# Patient Record
Sex: MALE | Race: WHITE | Employment: OTHER | URBAN - METROPOLITAN AREA
[De-identification: names, ages, dates, MRNs, and addresses within clinical notes are randomized per-mention and may not be internally consistent; named-entity substitution may affect disease eponyms.]

---

## 2022-08-18 ENCOUNTER — APPOINTMENT (OUTPATIENT)
Dept: GENERAL RADIOLOGY | Age: 67
DRG: 617 | End: 2022-08-18
Payer: MEDICARE

## 2022-08-18 ENCOUNTER — HOSPITAL ENCOUNTER (INPATIENT)
Age: 67
LOS: 6 days | Discharge: HOME | DRG: 617 | End: 2022-08-24
Attending: EMERGENCY MEDICINE | Admitting: INTERNAL MEDICINE
Payer: MEDICARE

## 2022-08-18 ENCOUNTER — APPOINTMENT (OUTPATIENT)
Dept: MRI IMAGING | Age: 67
DRG: 617 | End: 2022-08-18
Payer: MEDICARE

## 2022-08-18 DIAGNOSIS — M86.171 ACUTE OSTEOMYELITIS OF TOE OF RIGHT FOOT (HCC): ICD-10-CM

## 2022-08-18 DIAGNOSIS — L08.9 DIABETIC INFECTION OF RIGHT FOOT (HCC): ICD-10-CM

## 2022-08-18 DIAGNOSIS — E11.628 DIABETIC FOOT INFECTION (HCC): ICD-10-CM

## 2022-08-18 DIAGNOSIS — L08.9 DIABETIC FOOT INFECTION (HCC): ICD-10-CM

## 2022-08-18 DIAGNOSIS — E11.628 DIABETIC INFECTION OF RIGHT FOOT (HCC): ICD-10-CM

## 2022-08-18 DIAGNOSIS — M86.9 OSTEOMYELITIS, UNSPECIFIED SITE, UNSPECIFIED TYPE (HCC): ICD-10-CM

## 2022-08-18 DIAGNOSIS — E11.9 NEW ONSET TYPE 2 DIABETES MELLITUS (HCC): Primary | ICD-10-CM

## 2022-08-18 PROBLEM — R73.9 HYPERGLYCEMIA: Status: ACTIVE | Noted: 2022-08-18

## 2022-08-18 LAB
ABSOLUTE EOS #: <0.03 K/UL (ref 0–0.44)
ABSOLUTE IMMATURE GRANULOCYTE: 0.04 K/UL (ref 0–0.3)
ABSOLUTE LYMPH #: 1.07 K/UL (ref 1.1–3.7)
ABSOLUTE MONO #: 1.32 K/UL (ref 0.1–1.2)
ANION GAP SERPL CALCULATED.3IONS-SCNC: 11 MMOL/L (ref 9–17)
BASOPHILS # BLD: 0 % (ref 0–2)
BASOPHILS ABSOLUTE: 0.03 K/UL (ref 0–0.2)
BUN BLDV-MCNC: 20 MG/DL (ref 8–23)
BUN/CREAT BLD: 23 (ref 9–20)
C-REACTIVE PROTEIN: 186.6 MG/L (ref 0–5)
CALCIUM SERPL-MCNC: 9.4 MG/DL (ref 8.6–10.4)
CHLORIDE BLD-SCNC: 93 MMOL/L (ref 98–107)
CO2: 27 MMOL/L (ref 20–31)
CREAT SERPL-MCNC: 0.88 MG/DL (ref 0.7–1.2)
EOSINOPHILS RELATIVE PERCENT: 0 % (ref 1–4)
ESTIMATED AVERAGE GLUCOSE: 318 MG/DL
GFR AFRICAN AMERICAN: >60 ML/MIN
GFR NON-AFRICAN AMERICAN: >60 ML/MIN
GFR SERPL CREATININE-BSD FRML MDRD: ABNORMAL ML/MIN/{1.73_M2}
GLUCOSE BLD-MCNC: 252 MG/DL (ref 75–110)
GLUCOSE BLD-MCNC: 267 MG/DL (ref 75–110)
GLUCOSE BLD-MCNC: 353 MG/DL (ref 75–110)
GLUCOSE BLD-MCNC: 376 MG/DL (ref 70–99)
HBA1C MFR BLD: 12.7 % (ref 4–6)
HCT VFR BLD CALC: 44.8 % (ref 40.7–50.3)
HEMOGLOBIN: 15 G/DL (ref 13–17)
IMMATURE GRANULOCYTES: 0 %
LYMPHOCYTES # BLD: 9 % (ref 24–43)
MCH RBC QN AUTO: 30.6 PG (ref 25.2–33.5)
MCHC RBC AUTO-ENTMCNC: 33.5 G/DL (ref 28.4–34.8)
MCV RBC AUTO: 91.4 FL (ref 82.6–102.9)
MONOCYTES # BLD: 11 % (ref 3–12)
NRBC AUTOMATED: 0 PER 100 WBC
PDW BLD-RTO: 12 % (ref 11.8–14.4)
PLATELET # BLD: 198 K/UL (ref 138–453)
PMV BLD AUTO: 9.7 FL (ref 8.1–13.5)
POTASSIUM SERPL-SCNC: 4.2 MMOL/L (ref 3.7–5.3)
RBC # BLD: 4.9 M/UL (ref 4.21–5.77)
SEDIMENTATION RATE, ERYTHROCYTE: 81 MM/HR (ref 0–20)
SEG NEUTROPHILS: 80 % (ref 36–65)
SEGMENTED NEUTROPHILS ABSOLUTE COUNT: 9.67 K/UL (ref 1.5–8.1)
SODIUM BLD-SCNC: 131 MMOL/L (ref 135–144)
WBC # BLD: 12.1 K/UL (ref 3.5–11.3)

## 2022-08-18 PROCEDURE — 80048 BASIC METABOLIC PNL TOTAL CA: CPT

## 2022-08-18 PROCEDURE — 6360000002 HC RX W HCPCS: Performed by: NURSE PRACTITIONER

## 2022-08-18 PROCEDURE — 73630 X-RAY EXAM OF FOOT: CPT

## 2022-08-18 PROCEDURE — 96365 THER/PROPH/DIAG IV INF INIT: CPT

## 2022-08-18 PROCEDURE — 99285 EMERGENCY DEPT VISIT HI MDM: CPT

## 2022-08-18 PROCEDURE — 2580000003 HC RX 258: Performed by: NURSE PRACTITIONER

## 2022-08-18 PROCEDURE — 1200000000 HC SEMI PRIVATE

## 2022-08-18 PROCEDURE — 6360000002 HC RX W HCPCS: Performed by: EMERGENCY MEDICINE

## 2022-08-18 PROCEDURE — 85652 RBC SED RATE AUTOMATED: CPT

## 2022-08-18 PROCEDURE — 2580000003 HC RX 258: Performed by: STUDENT IN AN ORGANIZED HEALTH CARE EDUCATION/TRAINING PROGRAM

## 2022-08-18 PROCEDURE — 6370000000 HC RX 637 (ALT 250 FOR IP): Performed by: NURSE PRACTITIONER

## 2022-08-18 PROCEDURE — 83036 HEMOGLOBIN GLYCOSYLATED A1C: CPT

## 2022-08-18 PROCEDURE — 0HBMXZZ EXCISION OF RIGHT FOOT SKIN, EXTERNAL APPROACH: ICD-10-PCS | Performed by: PODIATRIST

## 2022-08-18 PROCEDURE — 6360000004 HC RX CONTRAST MEDICATION: Performed by: STUDENT IN AN ORGANIZED HEALTH CARE EDUCATION/TRAINING PROGRAM

## 2022-08-18 PROCEDURE — 2580000003 HC RX 258: Performed by: EMERGENCY MEDICINE

## 2022-08-18 PROCEDURE — 87040 BLOOD CULTURE FOR BACTERIA: CPT

## 2022-08-18 PROCEDURE — 99223 1ST HOSP IP/OBS HIGH 75: CPT | Performed by: NURSE PRACTITIONER

## 2022-08-18 PROCEDURE — 82947 ASSAY GLUCOSE BLOOD QUANT: CPT

## 2022-08-18 PROCEDURE — A9579 GAD-BASE MR CONTRAST NOS,1ML: HCPCS | Performed by: STUDENT IN AN ORGANIZED HEALTH CARE EDUCATION/TRAINING PROGRAM

## 2022-08-18 PROCEDURE — 85025 COMPLETE CBC W/AUTO DIFF WBC: CPT

## 2022-08-18 PROCEDURE — 73720 MRI LWR EXTREMITY W/O&W/DYE: CPT

## 2022-08-18 PROCEDURE — 86140 C-REACTIVE PROTEIN: CPT

## 2022-08-18 RX ORDER — SODIUM CHLORIDE 0.9 % (FLUSH) 0.9 %
10 SYRINGE (ML) INJECTION ONCE
Status: COMPLETED | OUTPATIENT
Start: 2022-08-18 | End: 2022-08-18

## 2022-08-18 RX ORDER — POTASSIUM CHLORIDE 20 MEQ/1
40 TABLET, EXTENDED RELEASE ORAL PRN
Status: DISCONTINUED | OUTPATIENT
Start: 2022-08-18 | End: 2022-08-24 | Stop reason: HOSPADM

## 2022-08-18 RX ORDER — ACETAMINOPHEN 650 MG/1
650 SUPPOSITORY RECTAL EVERY 6 HOURS PRN
Status: DISCONTINUED | OUTPATIENT
Start: 2022-08-18 | End: 2022-08-24 | Stop reason: HOSPADM

## 2022-08-18 RX ORDER — MAGNESIUM SULFATE 1 G/100ML
1000 INJECTION INTRAVENOUS PRN
Status: DISCONTINUED | OUTPATIENT
Start: 2022-08-18 | End: 2022-08-24 | Stop reason: HOSPADM

## 2022-08-18 RX ORDER — ONDANSETRON 4 MG/1
4 TABLET, ORALLY DISINTEGRATING ORAL EVERY 8 HOURS PRN
Status: DISCONTINUED | OUTPATIENT
Start: 2022-08-18 | End: 2022-08-24 | Stop reason: HOSPADM

## 2022-08-18 RX ORDER — INSULIN LISPRO 100 [IU]/ML
0-4 INJECTION, SOLUTION INTRAVENOUS; SUBCUTANEOUS NIGHTLY
Status: DISCONTINUED | OUTPATIENT
Start: 2022-08-18 | End: 2022-08-22

## 2022-08-18 RX ORDER — HYDROCODONE BITARTRATE AND ACETAMINOPHEN 5; 325 MG/1; MG/1
1 TABLET ORAL EVERY 4 HOURS PRN
Status: DISCONTINUED | OUTPATIENT
Start: 2022-08-18 | End: 2022-08-24 | Stop reason: HOSPADM

## 2022-08-18 RX ORDER — POTASSIUM CHLORIDE 7.45 MG/ML
10 INJECTION INTRAVENOUS PRN
Status: DISCONTINUED | OUTPATIENT
Start: 2022-08-18 | End: 2022-08-24 | Stop reason: HOSPADM

## 2022-08-18 RX ORDER — ENOXAPARIN SODIUM 100 MG/ML
40 INJECTION SUBCUTANEOUS DAILY
Status: DISCONTINUED | OUTPATIENT
Start: 2022-08-18 | End: 2022-08-24

## 2022-08-18 RX ORDER — POLYETHYLENE GLYCOL 3350 17 G/17G
17 POWDER, FOR SOLUTION ORAL DAILY PRN
Status: DISCONTINUED | OUTPATIENT
Start: 2022-08-18 | End: 2022-08-24 | Stop reason: HOSPADM

## 2022-08-18 RX ORDER — SODIUM CHLORIDE 0.9 % (FLUSH) 0.9 %
5-40 SYRINGE (ML) INJECTION EVERY 12 HOURS SCHEDULED
Status: DISCONTINUED | OUTPATIENT
Start: 2022-08-18 | End: 2022-08-24 | Stop reason: HOSPADM

## 2022-08-18 RX ORDER — INSULIN LISPRO 100 [IU]/ML
0-4 INJECTION, SOLUTION INTRAVENOUS; SUBCUTANEOUS
Status: DISCONTINUED | OUTPATIENT
Start: 2022-08-18 | End: 2022-08-22

## 2022-08-18 RX ORDER — SODIUM CHLORIDE 9 MG/ML
INJECTION, SOLUTION INTRAVENOUS CONTINUOUS
Status: DISCONTINUED | OUTPATIENT
Start: 2022-08-18 | End: 2022-08-20

## 2022-08-18 RX ORDER — ONDANSETRON 2 MG/ML
4 INJECTION INTRAMUSCULAR; INTRAVENOUS EVERY 6 HOURS PRN
Status: DISCONTINUED | OUTPATIENT
Start: 2022-08-18 | End: 2022-08-24 | Stop reason: HOSPADM

## 2022-08-18 RX ORDER — DEXTROSE MONOHYDRATE 100 MG/ML
INJECTION, SOLUTION INTRAVENOUS CONTINUOUS PRN
Status: DISCONTINUED | OUTPATIENT
Start: 2022-08-18 | End: 2022-08-24 | Stop reason: HOSPADM

## 2022-08-18 RX ORDER — HYDROCODONE BITARTRATE AND ACETAMINOPHEN 5; 325 MG/1; MG/1
2 TABLET ORAL EVERY 4 HOURS PRN
Status: DISCONTINUED | OUTPATIENT
Start: 2022-08-18 | End: 2022-08-24 | Stop reason: HOSPADM

## 2022-08-18 RX ORDER — SODIUM CHLORIDE 0.9 % (FLUSH) 0.9 %
10 SYRINGE (ML) INJECTION PRN
Status: DISCONTINUED | OUTPATIENT
Start: 2022-08-18 | End: 2022-08-24 | Stop reason: HOSPADM

## 2022-08-18 RX ORDER — ACETAMINOPHEN 325 MG/1
650 TABLET ORAL EVERY 6 HOURS PRN
Status: DISCONTINUED | OUTPATIENT
Start: 2022-08-18 | End: 2022-08-24 | Stop reason: HOSPADM

## 2022-08-18 RX ORDER — SODIUM CHLORIDE 9 MG/ML
INJECTION, SOLUTION INTRAVENOUS PRN
Status: DISCONTINUED | OUTPATIENT
Start: 2022-08-18 | End: 2022-08-24 | Stop reason: HOSPADM

## 2022-08-18 RX ADMIN — INSULIN LISPRO 4 UNITS: 100 INJECTION, SOLUTION INTRAVENOUS; SUBCUTANEOUS at 13:58

## 2022-08-18 RX ADMIN — SODIUM CHLORIDE: 9 INJECTION, SOLUTION INTRAVENOUS at 18:58

## 2022-08-18 RX ADMIN — INSULIN LISPRO 2 UNITS: 100 INJECTION, SOLUTION INTRAVENOUS; SUBCUTANEOUS at 17:14

## 2022-08-18 RX ADMIN — GADOTERIDOL 19 ML: 279.3 INJECTION, SOLUTION INTRAVENOUS at 15:47

## 2022-08-18 RX ADMIN — PIPERACILLIN AND TAZOBACTAM 3375 MG: 3; .375 INJECTION, POWDER, FOR SOLUTION INTRAVENOUS at 18:58

## 2022-08-18 RX ADMIN — PIPERACILLIN AND TAZOBACTAM 4500 MG: 4; .5 INJECTION, POWDER, FOR SOLUTION INTRAVENOUS at 12:22

## 2022-08-18 RX ADMIN — ACETAMINOPHEN 650 MG: 325 TABLET, FILM COATED ORAL at 22:39

## 2022-08-18 RX ADMIN — VANCOMYCIN HYDROCHLORIDE 2000 MG: 5 INJECTION, POWDER, LYOPHILIZED, FOR SOLUTION INTRAVENOUS at 13:40

## 2022-08-18 RX ADMIN — ENOXAPARIN SODIUM 40 MG: 100 INJECTION SUBCUTANEOUS at 18:59

## 2022-08-18 RX ADMIN — SODIUM CHLORIDE, PRESERVATIVE FREE 10 ML: 5 INJECTION INTRAVENOUS at 15:46

## 2022-08-18 ASSESSMENT — PAIN DESCRIPTION - FREQUENCY: FREQUENCY: CONTINUOUS

## 2022-08-18 ASSESSMENT — PAIN DESCRIPTION - ORIENTATION: ORIENTATION: MID

## 2022-08-18 ASSESSMENT — PAIN - FUNCTIONAL ASSESSMENT
PAIN_FUNCTIONAL_ASSESSMENT: 0-10
PAIN_FUNCTIONAL_ASSESSMENT: ACTIVITIES ARE NOT PREVENTED

## 2022-08-18 ASSESSMENT — ENCOUNTER SYMPTOMS
CONSTIPATION: 0
SORE THROAT: 0
SHORTNESS OF BREATH: 0
COLOR CHANGE: 0
NAUSEA: 0
CHEST TIGHTNESS: 0
EYES NEGATIVE: 1
VOMITING: 0
SINUS PAIN: 0
COUGH: 0
DIARRHEA: 0
COLOR CHANGE: 1
APNEA: 0
ABDOMINAL DISTENTION: 0

## 2022-08-18 ASSESSMENT — PAIN DESCRIPTION - LOCATION: LOCATION: COCCYX;HEAD

## 2022-08-18 ASSESSMENT — PAIN SCALES - GENERAL
PAINLEVEL_OUTOF10: 0
PAINLEVEL_OUTOF10: 2
PAINLEVEL_OUTOF10: 0

## 2022-08-18 ASSESSMENT — PAIN DESCRIPTION - DESCRIPTORS: DESCRIPTORS: ACHING

## 2022-08-18 ASSESSMENT — PAIN DESCRIPTION - ONSET: ONSET: ON-GOING

## 2022-08-18 ASSESSMENT — PAIN DESCRIPTION - PAIN TYPE: TYPE: ACUTE PAIN

## 2022-08-18 NOTE — CONSULTS
[facility] Pharmacokinetic Monitoring Service - Vancomycin     Kvng Robles is a 78 y.o. male starting on vancomycin therapy for SSTI. Pharmacy consulted by ORACIO Malhotra for monitoring and adjustment. Target Concentration: Goal trough of 10-15 mg/L and AUC/DANIELLE <500 mg*hr/L    Additional Antimicrobials: Zosyn    Pertinent Laboratory Values: Wt Readings from Last 1 Encounters:   08/18/22 200 lb (90.7 kg)     Temp Readings from Last 1 Encounters:   08/18/22 98.1 °F (36.7 °C) (Oral)     Estimated Creatinine Clearance: 96 mL/min (based on SCr of 0.88 mg/dL). Recent Labs     08/18/22  1135   CREATININE 0.88   WBC 12.1*     MRSA Nasal Swab: N/A. Non-respiratory infection.     Plan:  Dosing recommendations based on Bayesian software  Start vancomycin 2000mg once followed by 1000mg q12h  Anticipated AUC of 485 and trough concentration of 15.5 at steady state  Renal labs as indicated   Vancomycin concentration ordered for 8/19 @ 0600   Pharmacy will continue to monitor patient and adjust therapy as indicated    Thank you for the consult,  Juice Cox, [facility]  8/18/2022 6:00 PM

## 2022-08-18 NOTE — ED PROVIDER NOTES
EMERGENCY DEPARTMENT ENCOUNTER    Pt Name: Margo Morgan  MRN: [id number]  [place] 1955  Date of evaluation: 8/18/22  CHIEF COMPLAINT       Chief Complaint   Patient presents with    Wound Infection     Right foot infection     HISTORY OF PRESENT ILLNESS   78-year-old male presents emergency room for swelling redness and lesion to the bottom of the great right toe. Patient does not have any known health problems although has not seen a doctor in many years. He does not take any medications. He noticed the swelling and the lesion reportedly about a week ago. He reports it has been sore to walk on. Wife reports that she had seen the toe today and had concerns about how severe it looked. REVIEW OF SYSTEMS     Review of Systems   Constitutional:  Negative for activity change, chills and diaphoresis. HENT:  Negative for congestion, sinus pain and tinnitus. Eyes: Negative. Respiratory:  Negative for apnea, chest tightness and shortness of breath. Gastrointestinal:  Negative for abdominal distention, constipation, diarrhea and vomiting. Genitourinary:  Negative for difficulty urinating and frequency. Musculoskeletal:  Negative for arthralgias and myalgias. Skin:  Negative for color change and rash. Neurological:  Negative for dizziness. Hematological: Negative. Psychiatric/Behavioral: Negative. PASTMEDICAL HISTORY   No past medical history on file. Past Problem List  Patient Active Problem List   Diagnosis Code    [facility] M86.9     SURGICAL HISTORY     No past surgical history on file. CURRENT MEDICATIONS       Previous Medications    No medications on file     ALLERGIES     has No Known Allergies. FAMILY HISTORY     has no family status information on file.       SOCIAL HISTORY        PHYSICAL EXAM     INITIAL VITALS: /87   Pulse (!) 112   Temp 98.2 °F (36.8 °C) (Oral)   Resp 17   Ht 6' 2\" (1.88 m)   Wt 200 lb (90.7 kg)   SpO2 98%   BMI 25.68 kg/m² Physical Exam  Constitutional:       General: He is not in acute distress. Appearance: He is well-developed. HENT:      Head: Normocephalic. Eyes:      Pupils: Pupils are equal, round, and reactive to light. Cardiovascular:      Rate and Rhythm: Normal rate and regular rhythm. Heart sounds: Normal heart sounds. Pulmonary:      Effort: Pulmonary effort is normal. No respiratory distress. Breath sounds: Normal breath sounds. Abdominal:      General: Bowel sounds are normal.      Palpations: Abdomen is soft. Tenderness: There is no abdominal tenderness. Musculoskeletal:         General: Normal range of motion. Feet:      Right foot:      Skin integrity: Ulcer, skin breakdown and erythema present. Comments: Patient has ulcer to the bottom of the great toe with a significant amount of swelling to the toe with extending cellulitis into the foot. The toe is discolored and concerning for developing ischemic disease. Skin:     General: Skin is warm and dry. Neurological:      Mental Status: He is alert and oriented to person, place, and time. MEDICAL DECISION MAKIN-year-old male presents emergency room for swelling and ulcer to the great right toe. On exam patient has marked swelling with ulceration. Glucose is 316. This is a diabetic foot wound and does appear infected. I have high suspicion for osteomyelitis given his exam and the x-ray. Case discussed with podiatry resident. Plan is for admission and IV antibiotics. Case discussed with [facility] who will accept.        CRITICAL CARE:       PROCEDURES:    Procedures    DIAGNOSTIC RESULTS   EKG:All EKG's are interpreted by the Emergency Department Physician who either signs or Co-signs this chart in the absence of a cardiologist.        RADIOLOGY:All plain film, CT, MRI, and formal ultrasound images (except ED bedside ultrasound) are read by the radiologist, see reports below, unless otherwisenoted in MDM or here.  XR FOOT RIGHT (MIN 3 VIEWS)   Final Result   Mildly heterogeneous attenuation of the 1st distal phalanx and osteomyelitis   is a consideration. Diffuse soft tissue swelling with soft tissue laceration or defect of the 1st   digit. LABS: All lab results were reviewed by myself, and all abnormals are listed below.   Labs Reviewed   CBC WITH AUTO DIFFERENTIAL - Abnormal; Notable for the following components:       Result Value    WBC 12.1 (*)     Seg Neutrophils 80 (*)     Lymphocytes 9 (*)     Eosinophils % 0 (*)     Segs Absolute 9.67 (*)     Absolute Lymph # 1.07 (*)     Absolute Mono # 1.32 (*)     All other components within normal limits   BASIC METABOLIC PANEL - Abnormal; Notable for the following components:    Glucose 376 (*)     Bun/Cre Ratio 23 (*)     Sodium 131 (*)     Chloride 93 (*)     All other components within normal limits   C-REACTIVE PROTEIN - Abnormal; Notable for the following components:    .6 (*)     All other components within normal limits   CULTURE, BLOOD 1   CULTURE, BLOOD 1   SEDIMENTATION RATE       EMERGENCY DEPARTMENTCOURSE:         Vitals:    Vitals:    08/18/22 1040   BP: 115/87   Pulse: (!) 112   Resp: 17   Temp: 98.2 °F (36.8 °C)   TempSrc: Oral   SpO2: 98%   Weight: 200 lb (90.7 kg)   Height: 6' 2\" (1.88 m)       The patient was given the following medications while in the emergency department:  Orders Placed This Encounter   Medications    piperacillin-tazobactam (ZOSYN) 4,500 mg in sodium chloride 0.9 % 100 mL IVPB (mini-bag)     Order Specific Question:   Antimicrobial Indications     Answer:   Skin and Soft Tissue Infection    vancomycin (VANCOCIN) 2,000 mg in dextrose 5 % 500 mL IVPB     Order Specific Question:   Antimicrobial Indications     Answer:   Skin and Soft Tissue Infection    insulin lispro (HUMALOG) injection vial 0-4 Units    insulin lispro (HUMALOG) injection vial 0-4 Units    glucose chewable tablet 16 g    OR Linked Order Group dextrose bolus 10% 125 mL     dextrose bolus 10% 250 mL    glucagon (rDNA) injection 1 mg    dextrose 10 % infusion     CONSULTS:  IP CONSULT TO INTERNAL MEDICINE  PHARMACY TO DOSE VANCOMYCIN  PHARMACY TO DOSE VANCOMYCIN    FINAL IMPRESSION      1. Diabetic foot infection ([facility]          DISPOSITION/PLAN   DISPOSITION Admitted 08/18/2022 01:07:21 PM      PATIENT REFERRED TO:  No follow-up provider specified. DISCHARGE MEDICATIONS:  New Prescriptions    No medications on file     Julio Cesar Olivier MD  Attending Emergency Physician      Care during this encounter was due to an unprecedented national emergency due to COVID-19.              Jose Montemayor MD  08/18/22 [id number]

## 2022-08-18 NOTE — ACP (ADVANCE CARE PLANNING)
Advance Care Planning     Advance Care Planning Activator (Inpatient)  Conversation Note      Date of ACP Conversation: 8/18/2022     Conversation Conducted with: Patient with Decision Making Capacity    ACP Activator: Jaleesa Mccormick, [address] [place]:     Current Designated Health Care Decision Maker:     Click here to complete [address] including section of the Healthcare Decision Maker Relationship (ie \"Primary\")  Today we documented Decision Maker(s) consistent with Legal Next of Kin hierarchy. Care Preferences    Ventilation: \"If you were in your present state of health and suddenly became very ill and were unable to breathe on your own, what would your preference be about the use of a ventilator (breathing machine) if it were available to you? \"      Would the patient desire the use of ventilator (breathing machine)?: no    \"If your health worsens and it becomes clear that your chance of recovery is unlikely, what would your preference be about the use of a ventilator (breathing machine) if it were available to you? \"     Would the patient desire the use of ventilator (breathing machine)?: No      Resuscitation  \"CPR works best to restart the heart when there is a sudden event, like a heart attack, in someone who is otherwise healthy. Unfortunately, CPR does not typically restart the heart for people who have serious health conditions or who are very sick. \"    \"In the event your heart stopped as a result of an underlying serious health condition, would you want attempts to be made to restart your heart (answer \"yes\" for attempt to resuscitate) or would you prefer a natural death (answer \"no\" for do not attempt to resuscitate)? \" yes       [] Yes   [x] No   Educated Patient / Adonis Sauer regarding differences between Advance Directives and portable DNR orders.     Length of ACP Conversation in minutes:  3    Conversation Outcomes:  [x] ACP discussion completed  [] Existing advance directive reviewed with patient; no changes to patient's previously recorded wishes  [] New Advance Directive completed  [] Portable Do Not Rescitate prepared for Provider review and signature  [] POLST/POST/MOLST/MOST prepared for Provider review and signature      Follow-up plan:    [] Schedule follow-up conversation to continue planning  [] Referred individual to Provider for additional questions/concerns   [] Advised patient/agent/surrogate to review completed ACP document and update if needed with changes in condition, patient preferences or care setting    [] This note routed to one or more involved healthcare providers

## 2022-08-18 NOTE — ED NOTES
ED to inpatient nurses report     Chief Complaint   Patient presents with    Wound Infection     Right foot infection      Present to ED from home for swelling redness and lesion to the bottom of the great right toe. Patient does not have any known health problems although has not seen a doctor in many years. He does not take any medications. He noticed the swelling and the lesion reportedly about a week ago. He reports it has been sore to walk on. Wife reports that she had seen the toe today and had concerns about how severe it looked. LOC: alert and orientated to name, place, date  Vital signs   Vitals:    08/18/22 1040   BP: 115/87   Pulse: (!) 112   Resp: 17   Temp: 98.2 °F (36.8 °C)   TempSrc: Oral   SpO2: 98%   Weight: 200 lb (90.7 kg)   Height: 6' 2\" (1.88 m)      Oxygen Baseline RA    Current needs required RA  LDAs:   Peripheral IV 08/18/22 Right Antecubital (Active)     Mobility: Fully dependent  Fall Risk:    Pending ED orders: None  Present condition: Stable  Code Status: Full  Consults: IP CONSULT TO INTERNAL MEDICINE  PHARMACY TO DOSE VANCOMYCIN  PHARMACY TO DOSE VANCOMYCIN  [x]  Hospitalist  Completed  [x] yes [] no Who:   []  Medicine  Completed  [] yes [] No Who:   []  Cardiology  Completed  [] yes [] No Who:   []  GI   Completed  [] yes [] No Who:   []  Neurology  Completed  [] yes [] No Who:   []  Nephrology Completed  [] yes [] No Who:    []  Vascular  Completed  [] yes [] No Who:   []  Ortho  Completed  [] yes [] No Who:     []  Surgery  Completed  [] yes [] No Who:    []  Urology  Completed  [] yes [] No Who:    []  CT Surgery Completed  [] yes [] No Who:   []  Podiatry  Completed  [] yes [] No Who:    []  Other    Completed  [] yes [] No Who:  Interventions: IV, Labs, antibiotics, Insulin. Important Events: Pt has a significant infection to his Rt big toe.  Pt states he is not diabetic, Last BS was 353       Electronically signed by Henrietta Lucas RN on 8/18/2022 at 2:20 PM Henrietta Lucas RN  08/18/22 [id number]

## 2022-08-18 NOTE — CONSULTS
Consultation Note  Podiatric Medicine and Surgery     Subjective     Chief Complaint: Right foot infection    HPI:  Charles Larry is a 78 y.o. male seen at [phone] for a wound on the right foot. The patient reports that he does not follow with any doctors, does not take any medications other than vitamins, and does not have any significant past medical history, including diabetes. His wife is present at bedside and they report the wound started approximately 5 weeks ago, unknown cause, and has since become progressively worse. They have been soaking the foot in apple cider vinegar but not applying anything else to the wound. Patient states he doesn't smoke or drink alcohol. He has no complaints regarding the left foot. PCP is No primary care provider on file. ROS:   Review of Systems   Constitutional:  Negative for chills and fever. HENT:  Negative for congestion and sore throat. Respiratory:  Negative for cough and shortness of breath. Cardiovascular:  Positive for leg swelling (right). Negative for chest pain. Gastrointestinal:  Negative for diarrhea, nausea and vomiting. Musculoskeletal:  Negative for arthralgias, gait problem, joint swelling and myalgias. Skin:  Positive for color change and wound. Neurological:  Negative for syncope and numbness. Psychiatric/Behavioral:  Negative for behavioral problems and confusion. Past Medical History   has no past medical history on file. Past Surgical History   has no past surgical history on file. Medications  Prior to Admission medications    Not on File    Scheduled Meds:   vancomycin  2,000 mg IntraVENous Once    insulin lispro  0-4 Units SubCUTAneous TID WC    insulin lispro  0-4 Units SubCUTAneous Nightly     Continuous Infusions:   dextrose       PRN Meds:.glucose, dextrose bolus **OR** dextrose bolus, glucagon (rDNA), dextrose    Allergies  has No Known Allergies.     Family History  family history is not on file.    Social History   has no history on file for tobacco use.   has no history on file for alcohol use.   has no history on file for drug use. Objective     Vitals:  Patient Vitals for the past 8 hrs:   BP Temp Temp src Pulse Resp SpO2 Height Weight   22 1040 115/87 98.2 °F (36.8 °C) Oral (!) 112 17 98 % 6' 2\" (1.88 m) 200 lb (90.7 kg)     Average, Min, and Max for last 24 hours Vitals:  TEMPERATURE:  Temp  Av.2 °F (36.8 °C)  Min: 98.2 °F (36.8 °C)  Max: 98.2 °F (36.8 °C)    RESPIRATIONS RANGE: Resp  Av  Min: 17  Max: 17    PULSE RANGE: Pulse  Av  Min: 112  Max: 112    BLOOD PRESSURE RANGE:  Systolic (88UCN), AUF:708 , Min:115 , VDF:646   ; Diastolic (30AIY), ERS:45, Min:87, Max:87      PULSE OXIMETRY RANGE: SpO2  Av %  Min: 98 %  Max: 98 %  I&O:  No intake/output data recorded. CBC:  Recent Labs     22  1135   WBC 12.1*   HGB 15.0   HCT 44.8      .6*        BMP:  Recent Labs     22  1135   *   K 4.2   CL 93*   CO2 27   BUN 20   CREATININE 0.88   GLUCOSE 376*   CALCIUM 9.4        Coags:  No results for input(s): APTT, PROT, INR in the last 72 hours. No results found for: LABA1C  No results found for: SEDRATE  Lab Results   Component Value Date    .6 (H) 2022         Lower Extremity Physical Exam:    Vascular: DP and PT pulses are Palpable . CFT <3 seconds to all digits. Hair growth is absent to the level of the digits. Moderate non-pitting edema to the right lower extremity. Neuro: Saph/sural/SP/DP/plantar sensation diminished to light touch. Musculoskeletal: Muscle strength is adequate ROM, adequate strength to all lower extremity muscle groups. Gross deformity is absent. Compartments compressible. Dermatologic: Open wound to right medial hallux measuring approximately 2 x 2 x 0.4 cm and probes to tendon. There is discoloration and a strong associated malodor. The skin is boggy but no fluctuance or crepitus.    No wounds to the left foot. Marked erythema and warmth to the midfoot. Clinical:      Imaging:   XR FOOT RIGHT (MIN 3 VIEWS)   Final Result   Mildly heterogeneous attenuation of the 1st distal phalanx and osteomyelitis   is a consideration. Diffuse soft tissue swelling with soft tissue laceration or defect of the 1st   digit. MRI FOOT RIGHT W WO CONTRAST    (Results Pending)       Cultures:  Right foot Wound Cx: Pending    Assessment     Chance Diaz is a 78 y.o. male with   Diabetic foot ulcer to level of tendon, right hallux  Osteomyelitis of the right hallux. Cellulitis, RLE  Undiagnosed Type II DM with secondary peripheral neuropathy    Principal Problem:    Osteomyelitis ([address]  Resolved Problems:    * No resolved hospital problems. *        Plan     Patient examined and evaluated at bedside. Treatment options discussed in detail with the patient. Discussed the importance of a trip(s) to the OR to remove infection, patient understands and agrees. Radiographs reviewed and discussed in detail with the patient. Negative for soft tissue emphysema, acute fracture/dislocation, foreign body. Positive for osteomyelitis of the right hallux. MRI of the RLE confirms osteomyelitis of the right hallux. Sharp excisional debridement performed of the right hallux down to and including subcutaneous via blade. 100% of wound debrided followed by irrigation with sterile saline. No anesthesia required. Hemostasis obtained with direct pressure. Patient reports 0/10 post procedure pain. Cultures obtained  Continue medical management per Internal Medicine. Abx: Vanc/Zosyn  Recommend ID consult. Dressing applied to Right foot: 1/4 inch Iodoform, DSD ace. PWB to Right lower extremity in surgical shoe. Plan is for OR 8/19 in the afternoon for R hallux amputation. NPO at midnight  Needs consented and marked  Discussed with  Dr. Nickie Basurto.       Electronically signed by Hillary Herrera DPM on 8/18/2022 at 1:47 PM

## 2022-08-18 NOTE — CARE COORDINATION
Case Management Initial Discharge Plan  Garcia Johnston,         Readmission Risk              Risk of Unplanned Readmission:  0             Met with:patient to discuss discharge plans. Information verified: address, contacts, phone number, , insurance Yes  PCP: No primary care provider on file. Date of last visit: n/a    Insurance Provider: Medicare     Discharge Planning  Current Residence:  Home  Living Arrangements:  lives with wife    Home has 2 stories/12 stairs to climb  Support Systems:     Current Services PTA:    Supplier: n/a  Patient able to perform ADL's:Independent  DME used to aid ambulation prior to admission: walker /during admission none    Potential Assistance Needed:       Pharmacy: [facility]   Potential Assistance Purchasing Medications:     Does patient want to participate in local refill/ meds to beds program?       Patient agreeable to home care: No  [place] of choice provided:  n/a      Type of Home Care Services:  n/a  Patient expects to be discharged to:  home    Prior SNF/Rehab Placement and Facility: n/a  Agreeable to SNF/Rehab: No  [place] of choice provided: n/a   Evaluation: yes    Expected Discharge date:  TBD  Follow Up Appointment: Best Day/ Time: TBD    Transportation provider: wife  Transportation arrangements needed for discharge: No     Discharge Plan:    met with patient at bedside. Patient reports that he is able to ambulate with no devices today but sometimes utilizes a walker or cane. Patient indicates that he is independent and had been recently able to do things such as mow the grass. Wife is an RN and can provide care at home if needed. No needs anticipated.          Electronically signed by JESENIA Wynne on 22 at 1:50 PM EDT

## 2022-08-18 NOTE — H&P
[facility]  Office: [address], DO, Gibran Soto, DO, Kyle Linda, DO, Thao Cortes, DO, Gabi Clay MD, Lesa Cordova MD, Emanuel Quiñonez MD, Jarad Hernandez MD,  Haley Venegas MD, Patricia Vargas MD, Mary Ann Mendez, DO, Shaheed Laboy MD,  Mirna Sapp MD, Ino Ziegler MD, Sunny Jean DO, Elizabeth Ortiz MD, Candice Mata MD, Garland Madden MD, Joon Jenkins DO, Rocio Quiñonez MD, Sierra Ragland MD, Rashaun Sanchez, CNP,  Mady Almaguer, CNP, Caron Ellis, CNP, Thor Walker, CNP, Dane Taylor PA-C, Radha Mccormick, DNP, Linda Kelley, CNP, Marysol Mistry, CNP, Carl Mondragon, CNP, Elizabeth Varner, [facility], Cecelia Diamond, CNP, Radha Cortez, CNS, Agata Whitehead, DNP, Chante Lomeli, CNP, Mckenna Counter, CNP, Yue Kirk, CNP           [facility]    HISTORY AND PHYSICAL EXAMINATION            Date:   8/18/2022  Patient name:  Serina Blake  Date of admission:  8/18/2022 10:54 AM  MRN:   [id number]  Account:  [de-identified]  YOB: 1955  PCP:    No primary care provider on file. Room:   [place] [address]  Code Status:    No Order    Chief Complaint:     Chief Complaint   Patient presents with    Wound Infection     Right foot infection       History Obtained From:     patient    History of Present Illness:     Serina Blake is a 78 y.o. Non- / non  male who presents with redness and wound to the bottom of his right great toe and is admitted to the hospital for the management of Osteomyel[facility] ([facility] About 5 weeks ago he noticed that his toenail was falling off and it was bleeding from. He denies any injury. He said over the last several weeks its gotten progressively worse and [place] he noticed foul smelling drainage and worsening swelling and redness. But after walking around last week at the scene for all day started to become worse. extremities  BEHAVIOR/PSYCH:  negative for depression, anxiety    Physical Exam:   /84   Pulse 98   Temp 98.2 °F (36.8 °C) (Oral)   Resp 18   Ht 6' 2\" (1.88 m)   Wt 200 lb (90.7 kg)   SpO2 97%   BMI 25.68 kg/m²   Temp (24hrs), Av.2 °F (36.8 °C), Min:98.2 °F (36.8 °C), Max:98.2 °F (36.8 °C)    Recent Labs     22  1351   POCGLU 353*     No intake or output data in the 24 hours ending 22 1707    General Appearance:  alert, well appearing, and in no acute distress  Mental status: oriented to person, place, and time  Head:  normocephalic, atraumatic  Eye: no icterus, redness, pupils equal and reactive, extraocular eye movements intact, conjunctiva clear  Ear: normal external ear, no discharge, hearing intact  Nose:  no drainage noted  Mouth: mucous membranes moist  Neck: supple, no carotid bruits, thyroid not palpable  Lungs: Bilateral equal air entry, clear to auscultation, no wheezing, rales or rhonchi, normal effort  Cardiovascular: normal rate, regular rhythm, no murmur, gallop, rub. Abdomen: Soft, nontender, nondistended, normal bowel sounds, no hepatomegaly or splenomegaly  Neurologic: There are no new focal motor or sensory deficits, normal muscle tone and bulk, no abnormal sensation, normal speech, cranial nerves II through XII grossly intact  Skin: Right great toe swollen, plantar surface wound with areas of necrotic and foul-smelling purulent drainage. Areas of discoloration surrounding the entire toe  Extremities:  peripheral pulses palpable, no pedal edema or calf pain with palpation  Psych: normal affect     Investigations:      Laboratory Testing:  Recent Results (from the past 24 hour(s))   Culture, Blood 1    Collection Time: 22 11:30 AM    Specimen: Blood   Result Value Ref Range    Specimen Description . BLOOD     Special Requests RFA,12ML     Culture NO GROWTH <24 HRS    CBC with Auto Differential    Collection Time: 22 11:35 AM   Result Value Ref Range    WBC 12.1 (H) 3.5 - 11.3 k/uL    RBC 4.90 4.21 - 5.77 m/uL    Hemoglobin 15.0 13.0 - 17.0 g/dL    Hematocrit 44.8 40.7 - 50.3 %    MCV 91.4 82.6 - 102.9 fL    MCH 30.6 25.2 - 33.5 pg    MCHC 33.5 28.4 - 34.8 g/dL    RDW 12.0 11.8 - 14.4 %    Platelets 953 605 - 713 k/uL    MPV 9.7 8.1 - 13.5 fL    NRBC Automated 0.0 0.0 per 100 WBC    Seg Neutrophils 80 (H) 36 - 65 %    Lymphocytes 9 (L) 24 - 43 %    Monocytes 11 3 - 12 %    Eosinophils % 0 (L) 1 - 4 %    Basophils 0 0 - 2 %    Immature Granulocytes 0 0 %    Segs Absolute 9.67 (H) 1.50 - 8.10 k/uL    Absolute Lymph # 1.07 (L) 1.10 - 3.70 k/uL    Absolute Mono # 1.32 (H) 0.10 - 1.20 k/uL    Absolute Eos # <0.03 0.00 - 0.44 k/uL    Basophils Absolute 0.03 0.00 - 0.20 k/uL    Absolute Immature Granulocyte 0.04 0.00 - 0.30 k/uL   BMP    Collection Time: 08/18/22 11:35 AM   Result Value Ref Range    Glucose 376 (H) 70 - 99 mg/dL    BUN 20 8 - 23 mg/dL    Creatinine 0.88 0.70 - 1.20 mg/dL    Bun/Cre Ratio 23 (H) 9 - 20    Calcium 9.4 8.6 - 10.4 mg/dL    Sodium 131 (L) 135 - 144 mmol/L    Potassium 4.2 3.7 - 5.3 mmol/L    Chloride 93 (L) 98 - 107 mmol/L    CO2 27 20 - 31 mmol/L    Anion Gap 11 9 - 17 mmol/L    GFR Non-African American >60 >60 mL/min    GFR African American >60 >60 mL/min    GFR Comment         Sedimentation Rate    Collection Time: 08/18/22 11:35 AM   Result Value Ref Range    Sed Rate 81 (H) 0 - 20 mm/Hr   C-Reactive Protein    Collection Time: 08/18/22 11:35 AM   Result Value Ref Range    .6 (H) 0.0 - 5.0 mg/L   Culture, Blood 1    Collection Time: 08/18/22 11:35 AM    Specimen: Blood   Result Value Ref Range    Specimen Description . BLOOD     Special Requests LEFT AC,12ML     Culture NO GROWTH <24 HRS    POC Glucose Fingerstick    Collection Time: 08/18/22  1:51 PM   Result Value Ref Range    POC Glucose 353 (H) 75 - 110 mg/dL       Imaging/Diagnostics:  XR FOOT RIGHT (MIN 3 VIEWS)    Result Date: 8/18/2022  Mildly heterogeneous attenuation of the 1st distal phalanx and osteomyelitis is a consideration. Diffuse soft tissue swelling with soft tissue laceration or defect of the 1st digit. Assessment :      Hospital Problems             Last Modified POA    * (Principal) Osteomyelitis ([address] 8/18/2022 Yes    Hyperglycemia 8/18/2022 Yes       Plan:     Patient status inpatient in the Med/Surge    Start IV antibiotics, Zosyn and Vanco, pharmacy to dose  Podiatry consulted  MRI completed-results pending  Infectious disease consult  Monitor labs, replace electrolytes as needed  Check hemoglobin A1c  Glycemic control  DVT prophylaxis  Pain control  Wound care  Carb controlled diet  IV fluids for hydration  Blood cultures x2  Telemetry monitoring  Plan discussed with patient, wife at bedside and staff    Consultations:   [facility]  PHARMACY TO [address]    Patient is admitted as inpatient status because of co-morbidities listed above, severity of signs and symptoms as outlined, requirement for current medical therapies and most importantly because of direct risk to patient if care not provided in a hospital setting. Expected length of stay > 48 hours. WALTER Bowen NP  8/18/2022  5:07 PM    Copy sent to Dr. Israel primary care provider on file.

## 2022-08-19 ENCOUNTER — APPOINTMENT (OUTPATIENT)
Dept: GENERAL RADIOLOGY | Age: 67
DRG: 617 | End: 2022-08-19
Payer: MEDICARE

## 2022-08-19 ENCOUNTER — ANESTHESIA EVENT (OUTPATIENT)
Dept: OPERATING ROOM | Age: 67
DRG: 617 | End: 2022-08-19
Payer: MEDICARE

## 2022-08-19 ENCOUNTER — ANESTHESIA (OUTPATIENT)
Dept: OPERATING ROOM | Age: 67
DRG: 617 | End: 2022-08-19
Payer: MEDICARE

## 2022-08-19 PROBLEM — L03.90 CELLULITIS: Status: ACTIVE | Noted: 2022-08-19

## 2022-08-19 PROBLEM — E11.9 NEW ONSET TYPE 2 DIABETES MELLITUS (HCC): Status: ACTIVE | Noted: 2022-08-19

## 2022-08-19 LAB
ABSOLUTE EOS #: <0.03 K/UL (ref 0–0.44)
ABSOLUTE IMMATURE GRANULOCYTE: 0.05 K/UL (ref 0–0.3)
ABSOLUTE LYMPH #: 1.53 K/UL (ref 1.1–3.7)
ABSOLUTE MONO #: 1.19 K/UL (ref 0.1–1.2)
ANION GAP SERPL CALCULATED.3IONS-SCNC: 10 MMOL/L (ref 9–17)
BASOPHILS # BLD: 0 % (ref 0–2)
BASOPHILS ABSOLUTE: 0.03 K/UL (ref 0–0.2)
BUN BLDV-MCNC: 19 MG/DL (ref 8–23)
BUN/CREAT BLD: 18 (ref 9–20)
CALCIUM SERPL-MCNC: 9.5 MG/DL (ref 8.6–10.4)
CHLORIDE BLD-SCNC: 93 MMOL/L (ref 98–107)
CO2: 29 MMOL/L (ref 20–31)
CREAT SERPL-MCNC: 1.03 MG/DL (ref 0.7–1.2)
EOSINOPHILS RELATIVE PERCENT: 0 % (ref 1–4)
GFR AFRICAN AMERICAN: >60 ML/MIN
GFR NON-AFRICAN AMERICAN: >60 ML/MIN
GFR SERPL CREATININE-BSD FRML MDRD: ABNORMAL ML/MIN/{1.73_M2}
GLUCOSE BLD-MCNC: 222 MG/DL (ref 75–110)
GLUCOSE BLD-MCNC: 247 MG/DL (ref 75–110)
GLUCOSE BLD-MCNC: 268 MG/DL (ref 70–99)
GLUCOSE BLD-MCNC: 292 MG/DL (ref 75–110)
GLUCOSE BLD-MCNC: 345 MG/DL (ref 75–110)
HCT VFR BLD CALC: 46.6 % (ref 40.7–50.3)
HEMOGLOBIN: 15.2 G/DL (ref 13–17)
IMMATURE GRANULOCYTES: 0 %
LYMPHOCYTES # BLD: 12 % (ref 24–43)
MCH RBC QN AUTO: 30.3 PG (ref 25.2–33.5)
MCHC RBC AUTO-ENTMCNC: 32.6 G/DL (ref 28.4–34.8)
MCV RBC AUTO: 92.8 FL (ref 82.6–102.9)
MONOCYTES # BLD: 10 % (ref 3–12)
NRBC AUTOMATED: 0 PER 100 WBC
PDW BLD-RTO: 12.1 % (ref 11.8–14.4)
PLATELET # BLD: 221 K/UL (ref 138–453)
PMV BLD AUTO: 9.7 FL (ref 8.1–13.5)
POTASSIUM SERPL-SCNC: 4 MMOL/L (ref 3.7–5.3)
RBC # BLD: 5.02 M/UL (ref 4.21–5.77)
SEG NEUTROPHILS: 78 % (ref 36–65)
SEGMENTED NEUTROPHILS ABSOLUTE COUNT: 9.75 K/UL (ref 1.5–8.1)
SODIUM BLD-SCNC: 132 MMOL/L (ref 135–144)
VANCOMYCIN RANDOM: 14.5 UG/ML
WBC # BLD: 12.6 K/UL (ref 3.5–11.3)

## 2022-08-19 PROCEDURE — 2500000003 HC RX 250 WO HCPCS: Performed by: SPECIALIST

## 2022-08-19 PROCEDURE — 2580000003 HC RX 258: Performed by: SPECIALIST

## 2022-08-19 PROCEDURE — 7100000001 HC PACU RECOVERY - ADDTL 15 MIN: Performed by: PODIATRIST

## 2022-08-19 PROCEDURE — 2709999900 HC NON-CHARGEABLE SUPPLY: Performed by: PODIATRIST

## 2022-08-19 PROCEDURE — 99222 1ST HOSP IP/OBS MODERATE 55: CPT | Performed by: INTERNAL MEDICINE

## 2022-08-19 PROCEDURE — 6360000002 HC RX W HCPCS

## 2022-08-19 PROCEDURE — 87186 SC STD MICRODIL/AGAR DIL: CPT

## 2022-08-19 PROCEDURE — 88311 DECALCIFY TISSUE: CPT

## 2022-08-19 PROCEDURE — 3700000000 HC ANESTHESIA ATTENDED CARE: Performed by: PODIATRIST

## 2022-08-19 PROCEDURE — 1200000000 HC SEMI PRIVATE

## 2022-08-19 PROCEDURE — 6360000002 HC RX W HCPCS: Performed by: NURSE PRACTITIONER

## 2022-08-19 PROCEDURE — 85025 COMPLETE CBC W/AUTO DIFF WBC: CPT

## 2022-08-19 PROCEDURE — 2580000003 HC RX 258

## 2022-08-19 PROCEDURE — 6360000002 HC RX W HCPCS: Performed by: INTERNAL MEDICINE

## 2022-08-19 PROCEDURE — 82947 ASSAY GLUCOSE BLOOD QUANT: CPT

## 2022-08-19 PROCEDURE — 3600000002 HC SURGERY LEVEL 2 BASE: Performed by: PODIATRIST

## 2022-08-19 PROCEDURE — 3700000001 HC ADD 15 MINUTES (ANESTHESIA): Performed by: PODIATRIST

## 2022-08-19 PROCEDURE — 80202 ASSAY OF VANCOMYCIN: CPT

## 2022-08-19 PROCEDURE — 2500000003 HC RX 250 WO HCPCS: Performed by: PODIATRIST

## 2022-08-19 PROCEDURE — 88305 TISSUE EXAM BY PATHOLOGIST: CPT

## 2022-08-19 PROCEDURE — 87205 SMEAR GRAM STAIN: CPT

## 2022-08-19 PROCEDURE — 36415 COLL VENOUS BLD VENIPUNCTURE: CPT

## 2022-08-19 PROCEDURE — 87185 SC STD ENZYME DETCJ PER NZM: CPT

## 2022-08-19 PROCEDURE — 73630 X-RAY EXAM OF FOOT: CPT

## 2022-08-19 PROCEDURE — 6370000000 HC RX 637 (ALT 250 FOR IP)

## 2022-08-19 PROCEDURE — 87076 CULTURE ANAEROBE IDENT EACH: CPT

## 2022-08-19 PROCEDURE — 2580000003 HC RX 258: Performed by: INTERNAL MEDICINE

## 2022-08-19 PROCEDURE — 80048 BASIC METABOLIC PNL TOTAL CA: CPT

## 2022-08-19 PROCEDURE — 2720000010 HC SURG SUPPLY STERILE: Performed by: PODIATRIST

## 2022-08-19 PROCEDURE — 87077 CULTURE AEROBIC IDENTIFY: CPT

## 2022-08-19 PROCEDURE — 3600000012 HC SURGERY LEVEL 2 ADDTL 15MIN: Performed by: PODIATRIST

## 2022-08-19 PROCEDURE — 6360000002 HC RX W HCPCS: Performed by: SPECIALIST

## 2022-08-19 PROCEDURE — 6370000000 HC RX 637 (ALT 250 FOR IP): Performed by: NURSE PRACTITIONER

## 2022-08-19 PROCEDURE — 2580000003 HC RX 258: Performed by: NURSE PRACTITIONER

## 2022-08-19 PROCEDURE — 0Y6P0Z0 DETACHMENT AT RIGHT 1ST TOE, COMPLETE, OPEN APPROACH: ICD-10-PCS | Performed by: PODIATRIST

## 2022-08-19 PROCEDURE — 7100000000 HC PACU RECOVERY - FIRST 15 MIN: Performed by: PODIATRIST

## 2022-08-19 PROCEDURE — 87070 CULTURE OTHR SPECIMN AEROBIC: CPT

## 2022-08-19 PROCEDURE — 87075 CULTR BACTERIA EXCEPT BLOOD: CPT

## 2022-08-19 RX ORDER — PHENYLEPHRINE HCL IN 0.9% NACL 1 MG/10 ML
SYRINGE (ML) INTRAVENOUS PRN
Status: DISCONTINUED | OUTPATIENT
Start: 2022-08-19 | End: 2022-08-19 | Stop reason: SDUPTHER

## 2022-08-19 RX ORDER — FENTANYL CITRATE 50 UG/ML
INJECTION, SOLUTION INTRAMUSCULAR; INTRAVENOUS PRN
Status: DISCONTINUED | OUTPATIENT
Start: 2022-08-19 | End: 2022-08-19 | Stop reason: SDUPTHER

## 2022-08-19 RX ORDER — FENTANYL CITRATE 50 UG/ML
25 INJECTION, SOLUTION INTRAMUSCULAR; INTRAVENOUS EVERY 5 MIN PRN
Status: DISCONTINUED | OUTPATIENT
Start: 2022-08-19 | End: 2022-08-19 | Stop reason: HOSPADM

## 2022-08-19 RX ORDER — PROPOFOL 10 MG/ML
INJECTION, EMULSION INTRAVENOUS PRN
Status: DISCONTINUED | OUTPATIENT
Start: 2022-08-19 | End: 2022-08-19 | Stop reason: SDUPTHER

## 2022-08-19 RX ORDER — LIDOCAINE HYDROCHLORIDE 20 MG/ML
INJECTION, SOLUTION EPIDURAL; INFILTRATION; INTRACAUDAL; PERINEURAL PRN
Status: DISCONTINUED | OUTPATIENT
Start: 2022-08-19 | End: 2022-08-19 | Stop reason: SDUPTHER

## 2022-08-19 RX ORDER — OXYCODONE HYDROCHLORIDE 5 MG/1
5 TABLET ORAL
Status: DISCONTINUED | OUTPATIENT
Start: 2022-08-19 | End: 2022-08-19 | Stop reason: HOSPADM

## 2022-08-19 RX ORDER — DEXAMETHASONE SODIUM PHOSPHATE 10 MG/ML
INJECTION, SOLUTION INTRAMUSCULAR; INTRAVENOUS PRN
Status: DISCONTINUED | OUTPATIENT
Start: 2022-08-19 | End: 2022-08-19 | Stop reason: SDUPTHER

## 2022-08-19 RX ORDER — FENTANYL CITRATE 50 UG/ML
50 INJECTION, SOLUTION INTRAMUSCULAR; INTRAVENOUS EVERY 5 MIN PRN
Status: DISCONTINUED | OUTPATIENT
Start: 2022-08-19 | End: 2022-08-19 | Stop reason: HOSPADM

## 2022-08-19 RX ORDER — INSULIN GLARGINE 100 [IU]/ML
10 INJECTION, SOLUTION SUBCUTANEOUS NIGHTLY
Status: DISCONTINUED | OUTPATIENT
Start: 2022-08-19 | End: 2022-08-20

## 2022-08-19 RX ORDER — ONDANSETRON 2 MG/ML
4 INJECTION INTRAMUSCULAR; INTRAVENOUS
Status: DISCONTINUED | OUTPATIENT
Start: 2022-08-19 | End: 2022-08-19 | Stop reason: HOSPADM

## 2022-08-19 RX ORDER — ONDANSETRON 2 MG/ML
INJECTION INTRAMUSCULAR; INTRAVENOUS PRN
Status: DISCONTINUED | OUTPATIENT
Start: 2022-08-19 | End: 2022-08-19 | Stop reason: SDUPTHER

## 2022-08-19 RX ORDER — MIDAZOLAM HYDROCHLORIDE 1 MG/ML
INJECTION INTRAMUSCULAR; INTRAVENOUS PRN
Status: DISCONTINUED | OUTPATIENT
Start: 2022-08-19 | End: 2022-08-19 | Stop reason: SDUPTHER

## 2022-08-19 RX ORDER — SODIUM CHLORIDE 9 MG/ML
INJECTION, SOLUTION INTRAVENOUS CONTINUOUS PRN
Status: DISCONTINUED | OUTPATIENT
Start: 2022-08-19 | End: 2022-08-19 | Stop reason: SDUPTHER

## 2022-08-19 RX ADMIN — PIPERACILLIN AND TAZOBACTAM 3375 MG: 3; .375 INJECTION, POWDER, FOR SOLUTION INTRAVENOUS at 10:12

## 2022-08-19 RX ADMIN — PIPERACILLIN AND TAZOBACTAM 3375 MG: 3; .375 INJECTION, POWDER, FOR SOLUTION INTRAVENOUS at 02:36

## 2022-08-19 RX ADMIN — SODIUM CHLORIDE: 9 INJECTION, SOLUTION INTRAVENOUS at 14:05

## 2022-08-19 RX ADMIN — PROPOFOL 200 MG: 10 INJECTION, EMULSION INTRAVENOUS at 14:17

## 2022-08-19 RX ADMIN — DEXAMETHASONE SODIUM PHOSPHATE 10 MG: 10 INJECTION, SOLUTION INTRAMUSCULAR; INTRAVENOUS at 14:23

## 2022-08-19 RX ADMIN — ONDANSETRON 4 MG: 2 INJECTION INTRAMUSCULAR; INTRAVENOUS at 14:45

## 2022-08-19 RX ADMIN — INSULIN LISPRO 4 UNITS: 100 INJECTION, SOLUTION INTRAVENOUS; SUBCUTANEOUS at 20:51

## 2022-08-19 RX ADMIN — INSULIN LISPRO 2 UNITS: 100 INJECTION, SOLUTION INTRAVENOUS; SUBCUTANEOUS at 08:31

## 2022-08-19 RX ADMIN — VANCOMYCIN HYDROCHLORIDE 1000 MG: 1 INJECTION, POWDER, LYOPHILIZED, FOR SOLUTION INTRAVENOUS at 14:23

## 2022-08-19 RX ADMIN — Medication 25 MCG: at 14:33

## 2022-08-19 RX ADMIN — INSULIN LISPRO 1 UNITS: 100 INJECTION, SOLUTION INTRAVENOUS; SUBCUTANEOUS at 13:44

## 2022-08-19 RX ADMIN — Medication 25 MCG: at 14:51

## 2022-08-19 RX ADMIN — VANCOMYCIN HYDROCHLORIDE 1000 MG: 1 INJECTION, POWDER, LYOPHILIZED, FOR SOLUTION INTRAVENOUS at 01:32

## 2022-08-19 RX ADMIN — INSULIN GLARGINE 10 UNITS: 100 INJECTION, SOLUTION SUBCUTANEOUS at 20:50

## 2022-08-19 RX ADMIN — LIDOCAINE HYDROCHLORIDE 100 MG: 20 INJECTION, SOLUTION EPIDURAL; INFILTRATION; INTRACAUDAL at 14:17

## 2022-08-19 RX ADMIN — INSULIN LISPRO 1 UNITS: 100 INJECTION, SOLUTION INTRAVENOUS; SUBCUTANEOUS at 18:09

## 2022-08-19 RX ADMIN — Medication 200 MCG: at 14:45

## 2022-08-19 RX ADMIN — Medication 200 MCG: at 14:23

## 2022-08-19 RX ADMIN — PIPERACILLIN AND TAZOBACTAM 3375 MG: 3; .375 INJECTION, POWDER, FOR SOLUTION INTRAVENOUS at 18:11

## 2022-08-19 RX ADMIN — Medication 50 MCG: at 14:17

## 2022-08-19 RX ADMIN — VANCOMYCIN HYDROCHLORIDE 1000 MG: 1 INJECTION, POWDER, LYOPHILIZED, FOR SOLUTION INTRAVENOUS at 13:48

## 2022-08-19 RX ADMIN — SODIUM CHLORIDE: 9 INJECTION, SOLUTION INTRAVENOUS at 08:47

## 2022-08-19 RX ADMIN — MIDAZOLAM 2 MG: 1 INJECTION INTRAMUSCULAR; INTRAVENOUS at 14:11

## 2022-08-19 ASSESSMENT — ENCOUNTER SYMPTOMS: SHORTNESS OF BREATH: 0

## 2022-08-19 ASSESSMENT — PAIN SCALES - GENERAL: PAINLEVEL_OUTOF10: 0

## 2022-08-19 NOTE — PROGRESS NOTES
[facility]  Office: [address], DO, Natali Armendariz, DO, Hipolito Miles, DO, Padilla Garcia Blood, DO, Jorge Gonzales MD, Yfn Ring MD, David Villalobos MD, Iveth Rayo MD,  Shayna Hernández MD, Marisabel Sotelo MD, Chalino Elias, DO, Ang Mancera MD,  Mauro Schulz MD, Lieutenant Thom MD, Deya Leos, DO, Lorenza eMndoza MD, Sandra Martin MD, Nuno Morales MD, Kal Potter, DO, Evonne Slade MD, Marli Caballero MD, Jose Hong, CNP,  Jj Coleman, CNP, Ronda Norman, CNP, Carlos Cornejo, CNP, Tae Novak PA-C, Shaylee Mcduffie, [facility], Ash Santiago, CNP, Bruno Alva, CNP, Marylu Betancourt, CNP, Mary Dunne, CNP, Sayra Mancia, CNP, Kia Sherwood, CNS, Placido Hopkins, [facility], Baldo Smallwood, CNP, Rachelle Zacarias, [facility], Santino Mccullough, [facility]    Progress Note    8/19/2022    11:16    Name:   Meghann Miramontes  MRN:     [id number]     Acct:      [de-identified]   Room:   2014/2014-02  IP Day:  1  Admit Date:  8/18/2022 10:54 AM    PCP:   No primary care provider on file. Code Status:  Full Code    Subjective:     C/C:   Chief Complaint   Patient presents with    Wound Infection     Right foot infection     Interval History Status: not changed. Patient is sitting comfortably in bed, continues to deny any pain. He is n.p.o. for surgery this afternoon. Vital signs stable    Brief History:   Meghann Miramontes is a 78 y.o. male who presents with redness and wound to the bottom of his right great toe and is admitted to the hospital for the management of Osteomyelitis. About 5 weeks ago he noticed that his toenail was falling off and it was bleeding and he noticed a wound to the bottom of his toe although he denies injury.   He said over the last several weeks its gotten progressively worse and [place] he noticed foul smelling drainage and worsening swelling and redness. Denies any pain. He reports he has not seen a doctor in many years and does not take any medications at home. X-ray of right foot shows concern for osteomyelitis and soft tissue swelling. Podiatry was consulted for further evaluation in the ED. blood sugars were noted to be in the 300s and there is concern for undiagnosed diabetes  Review of Systems:     Constitutional:  negative for chills, fevers, sweats  Respiratory:  negative for cough, dyspnea on exertion, shortness of breath, wheezing  Cardiovascular:  negative for chest pain, chest pressure/discomfort, lower extremity edema, palpitations  Gastrointestinal:  negative for abdominal pain, constipation, diarrhea, nausea, vomiting  Neurological:  negative for dizziness, headache  Positive for wound and skin color change  Medications: Allergies:  No Known Allergies    Current Meds:   Scheduled Meds:    vancomycin  1,000 mg IntraVENous Q12H    insulin lispro  0-4 Units SubCUTAneous TID WC    insulin lispro  0-4 Units SubCUTAneous Nightly    sodium chloride flush  5-40 mL IntraVENous 2 times per day    enoxaparin  40 mg SubCUTAneous Daily    piperacillin-tazobactam  3,375 mg IntraVENous Q8H    vancomycin (VANCOCIN) intermittent dosing (placeholder)   Other RX Placeholder     Continuous Infusions:    dextrose      sodium chloride 75 mL/hr at 08/19/22 0847    sodium chloride       PRN Meds: glucose, dextrose bolus **OR** dextrose bolus, glucagon (rDNA), dextrose, sodium chloride flush, sodium chloride, potassium chloride **OR** potassium alternative oral replacement **OR** potassium chloride, magnesium sulfate, ondansetron **OR** ondansetron, polyethylene glycol, acetaminophen **OR** acetaminophen, HYDROcodone 5 mg - acetaminophen **OR** HYDROcodone 5 mg - acetaminophen    Data:     Past Medical History:   has no past medical history on file. Social History:   reports that he has never smoked.  He has never used smokeless tobacco. He reports that he does not use drugs. Family History:   Family History   Problem Relation Age of Onset    No Known Problems Mother     No Known Problems Father        Vitals:  /76   Pulse 89   Temp 97.9 °F (36.6 °C) (Oral)   Resp 17   Ht 6' 2\" (1.88 m)   Wt 202 lb 8 oz (91.9 kg)   SpO2 95%   BMI 26.00 kg/m²   Temp (24hrs), Av.3 °F (36.8 °C), Min:97.9 °F (36.6 °C), Max:99 °F (37.2 °C)    Recent Labs     22  1351 22  0556   POCGLU 353* 267* 252* 292*       I/O (24Hr): Intake/Output Summary (Last 24 hours) at 2022 1150  Last data filed at 2022 0604  Gross per 24 hour   Intake 635.77 ml   Output 650 ml   Net -14.23 ml       Labs:  Hematology:  Recent Labs     22  11322  0647   WBC 12.1* 12.6*   RBC 4.90 5.02   HGB 15.0 15.2   HCT 44.8 46.6   MCV 91.4 92.8   MCH 30.6 30.3   MCHC 33.5 32.6   RDW 12.0 12.1    221   MPV 9.7 9.7   SEDRATE 81*  --    .6*  --      Chemistry:  Recent Labs     22  11322  0647   * 132*   K 4.2 4.0   CL 93* 93*   CO2 27 29   GLUCOSE 376* 268*   BUN 20 19   CREATININE 0.88 1.03   ANIONGAP 11 10   LABGLOM >60 >60   GFRAA >60 >60   CALCIUM 9.4 9.5     Recent Labs     22  1135 22  1351 22  17022  0556   LABA1C 12.7*  --   --   --   --    POCGLU  --  353* 267* 252* 292*     ABG:No results found for: POCPH, PHART, PH, POCPCO2, KRW9FPP, PCO2, POCPO2, PO2ART, PO2, POCHCO3, HBL9IPZ, HCO3, NBEA, PBEA, BEART, BE, THGBART, THB, HDN9FRO, MBTX4LEX, I5KTQZIJ, O2SAT, FIO2  Lab Results   Component Value Date/Time    SPECIAL LEFT AC,12ML 2022 11:35 AM     Lab Results   Component Value Date/Time    CULTURE NO GROWTH 19 HOURS 2022 11:35 AM       Radiology:  XR FOOT RIGHT (MIN 3 VIEWS)    Result Date: 2022  Mildly heterogeneous attenuation of the 1st distal phalanx and osteomyelitis is a consideration.  Diffuse soft tissue swelling with soft tissue laceration or defect of the 1st digit. MRI FOOT RIGHT W WO CONTRAST    Result Date: 8/18/2022  1. Soft tissue ulceration at the great toe with underlying complex rim enhancing collection measuring 2.3 x 2.8 x 3.6 cm most compatible with abscess. 2. Osteomyelitis of the proximal and distal phalanges of the right great toe. 3. Diffuse soft tissue edema with associated soft tissue enhancement consistent with cellulitis. Edema is most pronounced at the soft tissues of the great toe. Physical Examination:        General appearance:  alert, cooperative and no distress  Mental Status:  oriented to person, place and time and normal affect  Lungs:  clear to auscultation bilaterally, normal effort  Heart:  regular rate and rhythm, no murmur  Abdomen:  soft, nontender, nondistended, normal bowel sounds, no masses, hepatomegaly, splenomegaly  Extremities: Right foot with dressing in place. No tenderness in the calves  Skin: Open wound to right medial hallux with discoloration and foul-smelling drainage. The skin is boggy surrounding the toe. Erythema and warmth extending to the midfoot consistent with cellulitic change    Assessment:        Hospital Problems             Last Modified POA    * (Principal) Osteomyelitis ([address] 8/18/2022 Yes    Hyperglycemia 8/18/2022 Yes    Cellulitis 8/19/2022 Yes    New onset type 2 diabetes mellitus ([facility] 8/19/2022 Yes       Plan:        Diabetic foot ulcer with osteomyelitis and cellulitis-keep n.p.o. for surgery per podiatry this afternoon. To new wound care as ordered  Continue IV vancomycin and Zosyn, ID following  New onset type 2 diabetes mellitus-globin A1c 12.7 patient education/counseling provided. Initiate 10 units Lantus this evening, will titrate dose as needed, continue insulin sliding scale. Consult dietitian for diet education.   Will need education regarding insulin administration prior to discharge  Monitor labs, replace electrolytes as needed  Follow culture data  Continue telemetry monitoring  PT/OT as able  Plan discussed with patient and staff      WALTER Milan NP  8/19/2022  11:50 AM

## 2022-08-19 NOTE — BRIEF OP NOTE
PODIATRY BRIEF OP NOTE    PATIENT NAME: Kvng Robles  YOB: 1955  -  78 y.o. male  MRN: [id number]  DATE: 8/19/2022  BILLING #: [id number]    Surgeon(s):  Leeroy Ramirez DPM     ASSISTANTS: Markus Prieto DPM PGY-1    PRE-OP DIAGNOSIS:   Diabetic foot ulcer to level of tendon, right hallux  Osteomyelitis of the right hallux. Cellulitis, RLE  Undiagnosed Type II DM with secondary peripheral neuropathy    POST-OP DIAGNOSIS: Same as above. PROCEDURE:   Hallux Amputation, Right foot    ANESTHESIA: General with local    HEMOSTASIS: Pneumatic right ankle tourniquet @ 250 mmHg for 24 minutes. ESTIMATED BLOOD LOSS: Minimal     MATERIALS:   * No implants in log *    INJECTABLES: 10 cc of 1:1 mix of 0.5% marcaine plain and 1% lidocaine plain     SPECIMEN:   ID Type Source Tests Collected by Time Destination   A : RIGHT GREAT TOE, PLEASE SPLIT SPECIMEN FOR CULTURE AND PATHOLOGY  Tissue Foot SURGICAL PATHOLOGY, CULTURE, TISSUE Leeroy Ramirez DPM 8/19/2022 2163        COMPLICATIONS: none    FINDINGS: Upon incision, moderate amount of purulent drainage was expressed from the surgical site. The toe was disarticulated at the MPJ and was handed to the back table as a specimen. All purulent drainage was expressed from the area. Tendons were identified and resected. Pulse lavage was used to clean the area. Site was left open to drain. The patient was counseled at length about the risks of mandie Covid-19 during their perioperative period and any recovery window from their procedure. The patient was made aware that mandie Covid-19  may worsen their prognosis for recovering from their procedure  and lend to a higher morbidity and/or mortality risk. All material risks, benefits, and reasonable alternatives including postponing the procedure were discussed. The patient does wish to proceed with the procedure at this time.     Markus Prieto DPM   Podiatric Medicine & Surgery   8/19/2022 at 3:02 PM

## 2022-08-19 NOTE — CONSULTS
[facility]  Initial Consult Note  Date: 8/19/2022    Hospital day :1     Impression:   Has had a great toe diabetic foot ulcer with associated infection, osteomyelitis and abscess  Diabetes mellitus type 2 with associated peripheral neuropathy    Recommendations   The patient is currently on Zosyn and vancomycin. He is scheduled for surgery today with the podiatry service. Will follow the operative findings and culture data and adjust therapy accordingly. The wife did have multiple questions which I addressed. Chief complaint/reason for consultation:   Diabetic foot infection/osteomyelitis    History of Present Illness:   Charito Noble is a 78y.o.-year-old male who was initially admitted on 8/18/2022. Pio Kovacs is seen and evaluated at bedside and his wife was in the room at the time of my evaluation. He reports that he developed a wound on his right great toe about 5 weeks ago that he had not sought any medical treatment for. He then reports that he was walking for quite some time and visiting with his grandkids and since that time his wound has become progressively worse with soft tissue swelling and he reports that since he has been admitted he has had some fevers and chills but did not have any at home. Patient has since been admitted seen by the podiatry service and started on broad-spectrum antimicrobial therapy. Work-up included MRI imaging that has shown osteomyelitis and the patient is scheduled for surgery later today. I was asked to evaluate and help with antibiotic choice. I have personally reviewed the past medical history, past surgical history, medications, social history, and family history, and I have updated the database accordingly. Past Medical History:   History reviewed. No pertinent past medical history.   Past Surgical  History:     Past Surgical History:   Procedure Laterality Date    TONSILLECTOMY       Medications:      vancomycin  1,000 mg IntraVENous Q12H    insulin lispro  0-4 Units SubCUTAneous TID WC    insulin lispro  0-4 Units SubCUTAneous Nightly    sodium chloride flush  5-40 mL IntraVENous 2 times per day    enoxaparin  40 mg SubCUTAneous Daily    piperacillin-tazobactam  3,375 mg IntraVENous Q8H    vancomycin (VANCOCIN) intermittent dosing (placeholder)   Other RX Placeholder     Social History:     Social History     Socioeconomic History    Marital status:      Spouse name: Not on file    Number of children: Not on file    Years of education: Not on file    Highest education level: Not on file   Occupational History    Not on file   Tobacco Use    Smoking status: Never    Smokeless tobacco: Never   Substance and Sexual Activity    Alcohol use: Not on file     Comment: rarely    Drug use: Never    Sexual activity: Not on file   Other Topics Concern    Not on file   Social History Narrative    Not on file     Social Determinants of Health     Financial Resource Strain: Not on file   Food Insecurity: Not on file   Transportation Needs: Not on file   Physical Activity: Not on file   Stress: Not on file   Social Connections: Not on file   Intimate Partner Violence: Not on file   Housing Stability: Not on file     Family History:     Family History   Problem Relation Age of Onset    No Known Problems Mother     No Known Problems Father       Allergies:   Patient has no known allergies. Review of Systems:   General: No fevers or chills. Eyes: No double vision or blurry vision. ENT: No sore throat or runny nose. Cardiovascular: No chest pain or palpitations. Lung: No shortness of breath or cough. Abdomen: No nausea, vomiting, diarrhea, or abdominal pain. Genitourinary: No increased urinary frequency, or dysuria. Musculoskeletal:  Right great toe swelling, redness, wound  Hematologic: No bleeding or bruising. Neurologic: No headache, weakness, numbness, or tingling.     Physical Examination :   /76   Pulse 89   Temp 97.9 °F (36.6 °C) (Oral)   Resp 17   Ht 6' 2\" (1.88 m)   Wt 202 lb 8 oz (91.9 kg)   SpO2 95%   BMI 26.00 kg/m²     Temperature Range: Temp: 97.9 °F (36.6 °C) Temp  Av.3 °F (36.8 °C)  Min: 97.9 °F (36.6 °C)  Max: 99 °F (37.2 °C)  General Appearance: Awake, alert, and in no apparent distress  Head: Normocephalic, without obvious abnormality, atraumatic  Eyes: Pupils equal, round, reactive, to light and accommodation; extraocular movements intact; sclera anicteric; conjunctivae pink  ENT: Oropharynx clear, without erythema, exudate, or thrush. Neck: Supple, without lymphadenopathy. Pulmonary/Chest: Clear to auscultation, without wheezes, rales, or rhonchi  Cardiovascular: Regular rate and rhythm without murmurs, rubs, or gallops. Abdomen: Soft, nontender, nondistended. Extremities: No cyanosis, clubbing, edema, or effusions. Neurologic: No gross sensory or motor deficits. Skin: A picture of the right foot and great toe is attached below          Medical Decision Making:   I have independently reviewed/ordered the following labs:  CBC with Differential:   Recent Labs     22  1135 22  0647   WBC 12.1* 12.6*   HGB 15.0 15.2   HCT 44.8 46.6    221   LYMPHOPCT 9* 12*   MONOPCT 11 10     BMP:   Recent Labs     22  1135 22  0647   * 132*   K 4.2 4.0   CL 93* 93*   CO2 27 29   BUN 20 19   CREATININE 0.88 1.03     Hepatic Function Panel: No results for input(s): PROT, LABALBU, BILIDIR, IBILI, BILITOT, ALKPHOS, ALT, AST in the last 72 hours. No results found for: PROCAL    Lab Results   Component Value Date/Time    .6 2022 11:35 AM     No results found for: FERRITIN  No results found for: FIBRINOGEN  No results found for: DDIMER  No results found for: LDH    Lab Results   Component Value Date    SEDRATE 81 (H) 2022       No results found for: COVID19  No results found for requested labs within last 30 days.        Imaging Studies:   THREE XRAY VIEWS OF THE RIGHT FOOT 8/18/2022 11:25 am  FINDINGS:   Mildly heterogeneous attenuation of the 1st distal phalanx and osteomyelitis is a consideration. Diffuse soft tissue swelling with soft tissue laceration or defect of the 1st digit. MRI OF THE RIGHT FOOT WITHOUT AND WITH CONTRAST, 8/18/2022 3:45 pm   FINDINGS:   1. Soft tissue ulceration at the great toe with underlying complex rim enhancing collection measuring 2.3 x 2.8 x 3.6 cm most compatible with abscess. 2. Osteomyelitis of the proximal and distal phalanges of the right great toe. 3. Diffuse soft tissue edema with associated soft tissue enhancement consistent with cellulitis. Edema is most pronounced at the soft tissues of the great toe. Cultures:     Culture, Blood 1 [[id number]] Collected: 08/18/22 1135   Order Status: Completed Specimen: Blood Updated: 08/19/22 0917    Specimen Description . BLOOD    Special Requests LEFT AC,12ML    Culture NO GROWTH 19 HOURS   Culture, Blood 1 [[id number]] Collected: 08/18/22 1130   Order Status: Completed Specimen: Blood Updated: 08/19/22 0917    Specimen Description . BLOOD    Special Requests RFA,12ML    Culture NO GROWTH 19 HOURS   Culture, Anaerobic and Aerobic [[id number]]    Order Status: No result Specimen: Wound          Thank you for allowing us to participate in the care of this patient. Please call with questions. Electronically signed by Gabino Souza MD on 8/19/2022 at 10:41 AM      [facility]  Gabino Souza MD  [employer] messaging  OFFICE: [phone]      This note is created with the assistance of a speech recognition program.  While intending to generate a document that actually reflects the content of the visit, the document can still have some errors including those of syntax and sound a like substitutions which may escape proof reading. In such instances, actual meaning can be extrapolated by contextual diversion.

## 2022-08-19 NOTE — PLAN OF CARE
Problem: Discharge Planning  Goal: Discharge to home or other facility with appropriate resources  Outcome: Progressing  Flowsheets  Taken 8/18/2022 2004 by Lili Liang RN  Discharge to home or other facility with appropriate resources: Identify discharge learning needs (meds, wound care, etc)  Taken 8/18/2022 1925 by Chris Zavala RN  Discharge to home or other facility with appropriate resources:   Identify barriers to discharge with patient and caregiver   Arrange for needed discharge resources and transportation as appropriate   Identify discharge learning needs (meds, wound care, etc)     Problem: Pain  Goal: Verbalizes/displays adequate comfort level or baseline comfort level  Outcome: Progressing  Flowsheets (Taken 8/18/2022 1824 by Lili Liang RN)  Verbalizes/displays adequate comfort level or baseline comfort level: Encourage patient to monitor pain and request assistance     Problem: Safety - Adult  Goal: Free from fall injury  Outcome: Progressing  Flowsheets (Taken 8/18/2022 2210)  Free From Fall Injury: Instruct family/caregiver on patient safety     Problem: ABCDS Injury Assessment  Goal: Absence of physical injury  Outcome: Progressing  Flowsheets (Taken 8/18/2022 2210)  Absence of Physical Injury: Implement safety measures based on patient assessment     Problem: Musculoskeletal - Adult  Goal: Return mobility to safest level of function  Outcome: Progressing  Flowsheets (Taken 8/18/2022 2210)  Return Mobility to Safest Level of Function:   Assess patient stability and activity tolerance for standing, transferring and ambulating with or without assistive devices   Assist with transfers and ambulation using safe patient handling equipment as needed   Ensure adequate protection for wounds/incisions during mobilization   Obtain physical therapy/occupational therapy consults as needed   Instruct patient/family in ordered activity level     Problem: Musculoskeletal - Adult  Goal: Return ADL status to a safe level of function  Outcome: Progressing  Flowsheets (Taken 8/18/2022 2210)  Return ADL Status to a Safe Level of Function:   Administer medication as ordered   Assess activities of daily living deficits and provide assistive devices as needed   Obtain physical therapy/occupational therapy consults as needed   Assist and instruct patient to increase activity and self care as tolerated     Problem: Metabolic/Fluid and Electrolytes - Adult  Goal: Electrolytes maintained within normal limits  Outcome: Progressing  Flowsheets (Taken 8/18/2022 2210)  Electrolytes maintained within normal limits:   Monitor labs and assess patient for signs and symptoms of electrolyte imbalances   Administer electrolyte replacement as ordered   Monitor response to electrolyte replacements, including repeat lab results as appropriate     Problem: Metabolic/Fluid and Electrolytes - Adult  Goal: Hemodynamic stability and optimal renal function maintained  Outcome: Progressing  Flowsheets (Taken 8/18/2022 2210)  Hemodynamic stability and optimal renal function maintained:   Monitor labs and assess for signs and symptoms of volume excess or deficit   Monitor intake, output and patient weight   Monitor urine specific gravity, serum osmolarity and serum sodium as indicated or ordered   Monitor response to interventions for patient's volume status, including labs, urine output, blood pressure (other measures as available)     Problem: Metabolic/Fluid and Electrolytes - Adult  Goal: Glucose maintained within prescribed range  Outcome: Progressing  Flowsheets (Taken 8/18/2022 2210)  Glucose maintained within prescribed range:   Monitor blood glucose as ordered   Assess for signs and symptoms of hyperglycemia and hypoglycemia   Administer ordered medications to maintain glucose within target range

## 2022-08-19 NOTE — ANESTHESIA POSTPROCEDURE EVALUATION
Department of Anesthesiology  Postprocedure Note    Patient: Tran Stewart  MRN: [id number]  YOB: 1955  Date of evaluation: 8/19/2022      Procedure Summary     Date: 08/19/22 Room / Location: [address] [place] [address] - INPATIENT    Anesthesia Start: 7535 Anesthesia Stop: 1500    Procedure: TOE AMPUTATION HALLUX PULSE LAVAGE (Right: Foot) Diagnosis:       Osteomyelitis, unspecified site, unspecified type ([facility] [address]      (OSTEOMYELITIS HALLUX RIGHT)    Surgeons: Denisse Singh DPM Responsible Provider: Harvey Nieves MD    Anesthesia Type: MAC ASA Status: 3          Anesthesia Type: No value filed.     Erinn Phase I: Eirnn Score: 9    Erinn Phase II:        Anesthesia Post Evaluation    Complications: no

## 2022-08-19 NOTE — PROGRESS NOTES
Transitions of Care Pharmacy Service   Medication Review    The patient's list of current home medications has been reviewed. He does not take any prescription medications, only OTC supplements. Source(s) of information: patient      Please feel free to call me with any questions about this encounter. Thank you.     [facility]   Transitions of Care Pharmacy Service  Phone:  [phone]  Fax: [phone]      Electronically signed by [facility] on 8/19/2022 at 5:53 PM           Medications Prior to Admission:   Magnesium Bisglycinate (MAG GLYCINATE PO), Take 2 tablets by mouth Daily  ZINC PO, Take 1 tablet by mouth Daily  Glucosamine-Chondroitin (GLUCOSAMINE CHONDR COMPLEX PO), Take 2 tablets by mouth Daily  VITAMIN D-VITAMIN K PO, Take 1 tablet by mouth Daily  CINNAMON PO, Take 1 tablet by mouth Daily  TART CHERRY PO, Take 1 tablet by mouth Daily

## 2022-08-19 NOTE — CARE COORDINATION
Pt scheduled for rt great toe amputation today. Currently on IV Zosyn and Vanco.   Continue to follow post op.

## 2022-08-19 NOTE — PROGRESS NOTES
0721 123/76 97.9 °F (36.6 °C) Oral 89 17 95 %     Average, Min, and Max for last 24 hours Vitals:  TEMPERATURE:  Temp  Av.3 °F (36.8 °C)  Min: 97.9 °F (36.6 °C)  Max: 99 °F (37.2 °C)    RESPIRATIONS RANGE: Resp  Av.4  Min: 17  Max: 18    PULSE RANGE: Pulse  Av.8  Min: 89  Max: 112    BLOOD PRESSURE RANGE:  Systolic (57YZH), XUW:940 , Min:109 , BNY:090   ; Diastolic (73NRD), DAC:77, Min:76, Max:94      PULSE OXIMETRY RANGE: SpO2  Av.8 %  Min: 92 %  Max: 98 %    I/O last 3 completed shifts: In: 635.8 [IV Piggyback:635.8]  Out: 650 [Urine:650]    CBC:  Recent Labs     22  1135 22  0647   WBC 12.1* 12.6*   HGB 15.0 15.2   HCT 44.8 46.6    221   .6*  --         BMP:  Recent Labs     22  1135 22  0647   * 132*   K 4.2 4.0   CL 93* 93*   CO2 27 29   BUN 20 19   CREATININE 0.88 1.03   GLUCOSE 376* 268*   CALCIUM 9.4 9.5        Coags:  No results for input(s): APTT, PROT, INR in the last 72 hours. Lab Results   Component Value Date    SEDRATE 81 (H) 2022     Recent Labs     22  1135   .6*       Lower Extremity Physical Exam: Physical exam from 22. Dressings intact due to anticipation of surgery  Vascular: DP and PT pulses are Palpable . CFT <3 seconds to all digits. Hair growth is absent to the level of the digits. Moderate non-pitting edema to the right lower extremity. Neuro: Saph/sural/SP/DP/plantar sensation diminished to light touch. Musculoskeletal: Muscle strength is adequate ROM, adequate strength to all lower extremity muscle groups. Gross deformity is absent. Compartments compressible. Dermatologic: Open wound to right medial hallux measuring approximately 2 x 2 x 0.4 cm and probes to tendon. There is discoloration and a strong associated malodor. The skin is boggy but no fluctuance or crepitus. No wounds to the left foot. Marked erythema and warmth to the midfoot.      Clinical Images:  See media panel    Imaging:   MRI FOOT RIGHT W WO CONTRAST   Final Result   1. Soft tissue ulceration at the great toe with underlying complex rim   enhancing collection measuring 2.3 x 2.8 x 3.6 cm most compatible with   abscess. 2. Osteomyelitis of the proximal and distal phalanges of the right great toe. 3. Diffuse soft tissue edema with associated soft tissue enhancement   consistent with cellulitis. Edema is most pronounced at the soft tissues of   the great toe. XR FOOT RIGHT (MIN 3 VIEWS)   Final Result   Mildly heterogeneous attenuation of the 1st distal phalanx and osteomyelitis   is a consideration. Diffuse soft tissue swelling with soft tissue laceration or defect of the 1st   digit. Cultures: pending     Assessment   Serina Blake is a 78 y.o. male with   Diabetic foot ulcer to the level of tendon; right hallux  Osteomyelitis of right hallux  Cellulitis; RLE  Undiagnosed DM2 secondary to peripheral neuropathy    Principal Problem:    Osteomyelitis ([address]  Active Problems:    Hyperglycemia  Resolved Problems:    * No resolved hospital problems. *       Plan     Patient examined and evaluated at bedside. Treatment options discussed in detail with the patient. Discussed the importance of a trip(s) to the OR to remove infection, patient understands and agrees. Radiographs reviewed and discussed in detail with the patient. Negative for soft tissue emphysema, acute fracture/dislocation, foreign body. Positive for osteomyelitis of the right hallux. MRI of the RLE confirms osteomyelitis of the right hallux. Cultures obtained: pending  Continue medical management per Internal Medicine. Abx: Vanc/Zosyn  Recommend ID consult. Dressing left intact to Right foot: 1/4 inch Iodoform, DSD ace. PWB to Right lower extremity in surgical shoe. Plan is for OR 8/19 for R hallux amputation. NPO since midnight  Consent signed and right foot is marked  Will discuss with  Dr. Cale Carranza.     [facility] Brianda Rios   Podiatric Medicine & Surgery   8/19/2022 at 9:32 AM

## 2022-08-19 NOTE — PROGRESS NOTES
[address] [facility] Pharmacokinetic Monitoring Service - Vancomycin     Maria Esther Ortega is a 78 y.o. male starting on vancomycin therapy for possible osteomyelitis. Pharmacy consulted by ORACIO Patel for monitoring and adjustment. Target Concentration: Goal AUC/DANIELLE 400-600 mg*hr/L - updating goal due to concern for osteomyelitis     Additional Antimicrobials: Zosyn    Pertinent Laboratory Values: Wt Readings from Last 1 Encounters:   08/18/22 202 lb 8 oz (91.9 kg)     Temp Readings from Last 1 Encounters:   08/18/22 99 °F (37.2 °C) (Oral)     Estimated Creatinine Clearance: 82 mL/min (based on SCr of 1.03 mg/dL). Recent Labs     08/18/22  1135 08/19/22  0647   CREATININE 0.88 1.03   WBC 12.1* 12.6*     Procalcitonin: none    Pertinent Cultures:    MRSA Nasal Swab: N/A. Non-respiratory infection.     Recent vancomycin administrations                     vancomycin 1000 mg IVPB in 250 mL D5W addavial (mg) 1,000 mg New Bag 08/19/22 0132    vancomycin (VANCOCIN) 2,000 mg in dextrose 5 % 500 mL IVPB (mg) 2,000 mg New Bag 08/18/22 1340                    Assessment:  Date/Time Current Dose Concentration Timing of Concentration (h) AUC   8/19 at 0800 1000mg q12h 14.5 5.25hr 512   Note: Serum concentrations collected for AUC dosing may appear elevated if collected in close proximity to the dose administered, this is not necessarily an indication of toxicity    Plan:  Current dosing regimen is therapeutic  Continue current dose  Repeat vancomycin concentration will be ordered for 8/26 @ 0600 unless renal function changes  Pharmacy will continue to monitor patient and adjust therapy as indicated    Thank you for the consult,  LUCIAN Corona [facility]  8/19/2022 7:22 AM

## 2022-08-19 NOTE — PLAN OF CARE
Progressing  Flowsheets (Taken 8/19/2022 0847)  Incisions, Wounds, or Drain Sites Healing Without Sign and Symptoms of Infection: ADMISSION and DAILY: Assess and document risk factors for pressure ulcer development     Problem: Chronic Conditions and Co-morbidities  Goal: Patient's chronic conditions and co-morbidity symptoms are monitored and maintained or improved  Outcome: Progressing  Flowsheets (Taken 8/19/2022 0847)  Care Plan - Patient's Chronic Conditions and Co-Morbidity Symptoms are Monitored and Maintained or Improved: Monitor and assess patient's chronic conditions and comorbid symptoms for stability, deterioration, or improvement

## 2022-08-19 NOTE — PROGRESS NOTES
[facility] NOTE    Room # 2014/2014-02   Name: Twan Gonzalez            Jain: [address]     Reason for visit: Routine    I visited the patient and spouse    Admit Date & Time: 8/18/2022 10:54 AM    Assessment:  Twan Gonzalez is a 78 y.o. male in the hospital because he has a foot infection. Upon entering the room pt. Is found resting in bed after surgery and his spouse Tone Rushing is at bedside offering support. Intervention:  I introduced myself and my title as  I offered space for pt and spouse  to express feelings, needs, and concerns and provided a ministry presence. Both shared life review and spoke of their sons.  offered active listening and prayer. Outcome:  Receptive and coping. Plan:  Chaplains will remain available to offer spiritual and emotional support as needed.     Electronically signed by Kathryn Collins on 8/19/2022 at 4:52 PM.  Balbir

## 2022-08-20 PROBLEM — L08.9 TYPE 2 DIABETES MELLITUS WITH DIABETIC FOOT INFECTION (HCC): Status: ACTIVE | Noted: 2022-08-19

## 2022-08-20 PROBLEM — E11.628 TYPE 2 DIABETES MELLITUS WITH DIABETIC FOOT INFECTION (HCC): Status: ACTIVE | Noted: 2022-08-19

## 2022-08-20 PROBLEM — L08.9 TYPE 2 DIABETES MELLITUS WITH DIABETIC FOOT INFECTION (HCC): Status: ACTIVE | Noted: 2022-08-20

## 2022-08-20 PROBLEM — M86.171 ACUTE OSTEOMYELITIS OF TOE OF RIGHT FOOT (HCC): Status: ACTIVE | Noted: 2022-08-18

## 2022-08-20 PROBLEM — E11.628 TYPE 2 DIABETES MELLITUS WITH DIABETIC FOOT INFECTION (HCC): Status: ACTIVE | Noted: 2022-08-20

## 2022-08-20 LAB
ABSOLUTE EOS #: <0.03 K/UL (ref 0–0.44)
ABSOLUTE IMMATURE GRANULOCYTE: 0.11 K/UL (ref 0–0.3)
ABSOLUTE LYMPH #: 1.54 K/UL (ref 1.1–3.7)
ABSOLUTE MONO #: 1.09 K/UL (ref 0.1–1.2)
ANION GAP SERPL CALCULATED.3IONS-SCNC: 8 MMOL/L (ref 9–17)
BASOPHILS # BLD: 0 % (ref 0–2)
BASOPHILS ABSOLUTE: <0.03 K/UL (ref 0–0.2)
BUN BLDV-MCNC: 23 MG/DL (ref 8–23)
BUN/CREAT BLD: 26 (ref 9–20)
CALCIUM SERPL-MCNC: 8.8 MG/DL (ref 8.6–10.4)
CHLORIDE BLD-SCNC: 99 MMOL/L (ref 98–107)
CO2: 27 MMOL/L (ref 20–31)
CREAT SERPL-MCNC: 0.89 MG/DL (ref 0.7–1.2)
EOSINOPHILS RELATIVE PERCENT: 0 % (ref 1–4)
GFR AFRICAN AMERICAN: >60 ML/MIN
GFR NON-AFRICAN AMERICAN: >60 ML/MIN
GFR SERPL CREATININE-BSD FRML MDRD: ABNORMAL ML/MIN/{1.73_M2}
GLUCOSE BLD-MCNC: 278 MG/DL (ref 70–99)
GLUCOSE BLD-MCNC: 279 MG/DL (ref 75–110)
GLUCOSE BLD-MCNC: 287 MG/DL (ref 75–110)
GLUCOSE BLD-MCNC: 324 MG/DL (ref 75–110)
GLUCOSE BLD-MCNC: 325 MG/DL (ref 75–110)
HCT VFR BLD CALC: 41.4 % (ref 40.7–50.3)
HEMOGLOBIN: 13.6 G/DL (ref 13–17)
IMMATURE GRANULOCYTES: 1 %
LYMPHOCYTES # BLD: 11 % (ref 24–43)
MCH RBC QN AUTO: 30.4 PG (ref 25.2–33.5)
MCHC RBC AUTO-ENTMCNC: 32.9 G/DL (ref 28.4–34.8)
MCV RBC AUTO: 92.4 FL (ref 82.6–102.9)
MONOCYTES # BLD: 8 % (ref 3–12)
NRBC AUTOMATED: 0 PER 100 WBC
PDW BLD-RTO: 12 % (ref 11.8–14.4)
PLATELET # BLD: 215 K/UL (ref 138–453)
PMV BLD AUTO: 9.8 FL (ref 8.1–13.5)
POTASSIUM SERPL-SCNC: 4.2 MMOL/L (ref 3.7–5.3)
RBC # BLD: 4.48 M/UL (ref 4.21–5.77)
SEG NEUTROPHILS: 80 % (ref 36–65)
SEGMENTED NEUTROPHILS ABSOLUTE COUNT: 11.28 K/UL (ref 1.5–8.1)
SODIUM BLD-SCNC: 134 MMOL/L (ref 135–144)
WBC # BLD: 14 K/UL (ref 3.5–11.3)

## 2022-08-20 PROCEDURE — 1200000000 HC SEMI PRIVATE

## 2022-08-20 PROCEDURE — 6370000000 HC RX 637 (ALT 250 FOR IP)

## 2022-08-20 PROCEDURE — 6360000002 HC RX W HCPCS

## 2022-08-20 PROCEDURE — 2580000003 HC RX 258

## 2022-08-20 PROCEDURE — 99232 SBSQ HOSP IP/OBS MODERATE 35: CPT | Performed by: NURSE PRACTITIONER

## 2022-08-20 PROCEDURE — 6370000000 HC RX 637 (ALT 250 FOR IP): Performed by: INTERNAL MEDICINE

## 2022-08-20 PROCEDURE — 97530 THERAPEUTIC ACTIVITIES: CPT

## 2022-08-20 PROCEDURE — 80048 BASIC METABOLIC PNL TOTAL CA: CPT

## 2022-08-20 PROCEDURE — 99232 SBSQ HOSP IP/OBS MODERATE 35: CPT | Performed by: INTERNAL MEDICINE

## 2022-08-20 PROCEDURE — 82947 ASSAY GLUCOSE BLOOD QUANT: CPT

## 2022-08-20 PROCEDURE — 85025 COMPLETE CBC W/AUTO DIFF WBC: CPT

## 2022-08-20 PROCEDURE — 97163 PT EVAL HIGH COMPLEX 45 MIN: CPT

## 2022-08-20 PROCEDURE — 36415 COLL VENOUS BLD VENIPUNCTURE: CPT

## 2022-08-20 RX ORDER — INSULIN GLARGINE 100 [IU]/ML
20 INJECTION, SOLUTION SUBCUTANEOUS NIGHTLY
Status: DISCONTINUED | OUTPATIENT
Start: 2022-08-20 | End: 2022-08-22

## 2022-08-20 RX ADMIN — INSULIN GLARGINE 20 UNITS: 100 INJECTION, SOLUTION SUBCUTANEOUS at 20:16

## 2022-08-20 RX ADMIN — PIPERACILLIN AND TAZOBACTAM 3375 MG: 3; .375 INJECTION, POWDER, FOR SOLUTION INTRAVENOUS at 10:51

## 2022-08-20 RX ADMIN — PIPERACILLIN AND TAZOBACTAM 3375 MG: 3; .375 INJECTION, POWDER, FOR SOLUTION INTRAVENOUS at 03:56

## 2022-08-20 RX ADMIN — ENOXAPARIN SODIUM 40 MG: 100 INJECTION SUBCUTANEOUS at 08:18

## 2022-08-20 RX ADMIN — VANCOMYCIN HYDROCHLORIDE 1000 MG: 1 INJECTION, POWDER, LYOPHILIZED, FOR SOLUTION INTRAVENOUS at 15:15

## 2022-08-20 RX ADMIN — INSULIN LISPRO 3 UNITS: 100 INJECTION, SOLUTION INTRAVENOUS; SUBCUTANEOUS at 12:41

## 2022-08-20 RX ADMIN — PIPERACILLIN AND TAZOBACTAM 3375 MG: 3; .375 INJECTION, POWDER, FOR SOLUTION INTRAVENOUS at 18:18

## 2022-08-20 RX ADMIN — VANCOMYCIN HYDROCHLORIDE 1000 MG: 1 INJECTION, POWDER, LYOPHILIZED, FOR SOLUTION INTRAVENOUS at 00:46

## 2022-08-20 RX ADMIN — INSULIN LISPRO 3 UNITS: 100 INJECTION, SOLUTION INTRAVENOUS; SUBCUTANEOUS at 16:41

## 2022-08-20 RX ADMIN — INSULIN LISPRO 2 UNITS: 100 INJECTION, SOLUTION INTRAVENOUS; SUBCUTANEOUS at 08:18

## 2022-08-20 ASSESSMENT — PAIN SCALES - GENERAL: PAINLEVEL_OUTOF10: 0

## 2022-08-20 ASSESSMENT — ENCOUNTER SYMPTOMS
GASTROINTESTINAL NEGATIVE: 1
RESPIRATORY NEGATIVE: 1

## 2022-08-20 NOTE — PROGRESS NOTES
Nutrition Education    Patient admitted with osteomyelitis, diabetic foot infection. Patient is newly diagnosed diabetic. Blood sugar in 300's. Educated patient on diabetes management and gave handouts 1. Carb counting for diabetes 2. Reading labels for diabetes 3. Using plate method for meals. Discussed how DM medication, exercise/activity and diet effect blood sugar and ways to keep three in balance. Patient verbalized understanding. Learners: Patient  Readiness: Acceptance  Method: Explanation and Handout  Response: Verbalizes Understanding  Contact name and number provided.       Gustabo Mcclellan RD, LD  Office phone [phone]

## 2022-08-20 NOTE — OP NOTE
well-padded pneumatic ankle tourniquet was  applied to the right ankle. The right foot was then anesthetized with  10 mL of a 1:1 solution of 1% lidocaine plain and 0.5% Marcaine plain. The right foot was prepped and draped in the usual aseptic fashion. Time-out was performed to confirm the correct patient, correct site,  correct procedure. Everyone in the room was in agreement. PROCEDURE IN DETAIL:  Sterile Esmarch was utilized to exsanguinate the  right foot. Pneumatic ankle tourniquet was inflated to 250 mmHg. I  directed my attention to the right hallux. The hallux which was  debrided was small and erythematous, and there was active purulence  drainage appreciated. At this time, I created circumferential incision  at the level of the metatarsophalangeal joint. Dissection was carried  deep through the subcutaneous tissues down to the level of the joint  capsule. Purulent drainage was appreciated. The toe was then  disarticulated with metatarsophalangeal joint and sent for pathology and  culture. Wound was flushed with copious amounts of sterile saline  utilizing pulse lavage. The extensor and flexor tendons were transected  at the most proximal aspect available. Evaluation of the first  metatarsal head revealed some degenerative changes consistent with  osteoarthritis, but there does not appear to be any necrosis of the  first metatarsal head. At this time, we then packed the wound with  iodoform gauze packing. Dressings were applied to the right foot. Pneumatic ankle tourniquet was then deflated and prompt hyperemic  response was noted to the remaining digits of the right foot. The  patient was extubated by Anesthesia Department and returned to the  recovery room with vital signs stable. He appeared to tolerate the  anesthesia and procedure well. PLAN:  We will follow up on pathology and culture results.   Plan for  hopeful delayed primary closure within the next 3-4 days pending the  clinical resolution at this time of symptoms of infection. I did inform  the patient's wife of the intraoperative findings.   We will continue to  follow the patient while he is in the hospital.        Frances Jacobs    D: 08/20/2022 9:02:29       T: 08/20/2022 10:50:23     [id number]  Job#: [id number]     Doc#: [id number]    CC:

## 2022-08-20 NOTE — PROGRESS NOTES
[facility]  Progress Note    Nasreen Quintanilla  MRN: [id number]  Date: 8/20/2022  LOS: 2     Reason for F/U :   Diabetic foot ulcer    Impression :   Right great toe diabetic foot ulcer with associated infection, osteomyelitis and abscess  Status post right foot hallux amputation 8/19/2022  Diabetes mellitus type 2 with associated peripheral neuropathy    Recommendations: The patient continues on IV vancomycin and Zosyn  We will continue to follow operative findings, surgical culture data and adjust therapy accordingly    Infection Control Recommendations:   Universal precautions    Discharge Planning:   Estimated Length of IV antimicrobials: To be determined  Patient will need Midline Catheter Insertion/ PICC line Insertion: No  Patient will need: Home IV , [place],  SNF,  LTAC: Undetermined  Patient willneed outpatient wound care: No    Medical Decision making / Summary of Stay:   Nasreen Quintanilla is a 78y.o.-year-old male who was initially admitted on 8/18/2022. Meghann Judge is seen and evaluated at bedside and his wife was in the room at the time of my evaluation. He reports that he developed a wound on his right great toe about 5 weeks ago that he had not sought any medical treatment for. He then reports that he was walking for quite some time and visiting with his grandkids and since that time his wound has become progressively worse with soft tissue swelling and he reports that since he has been admitted he has had some fevers and chills but did not have any at home. Patient has since been admitted seen by the podiatry service and started on broad-spectrum antimicrobial therapy. Work-up included MRI imaging that has shown osteomyelitis and the patient is scheduled for surgery later today. I was asked to evaluate and help with antibiotic choice.        Current evaluation:8/20/2022    /63   Pulse 79   Temp 97.7 °F (36.5 °C) (Oral)   Resp 17   Ht 6' 2\" (1.88 m)   Wt 223 lb (101.2 kg)   SpO2 91%   BMI 28.63 kg/m²     Temperature Range: Temp: 97.7 °F (36.5 °C) Temp  Av.9 °F (36.6 °C)  Min: 97 °F (36.1 °C)  Max: 98.6 °F (37 °C)    Patient was seen and evaluated sitting up in the bed  RN is at bedside  Patient states he feels well, denies pain at all  No fevers or chills    Review of Systems   Constitutional: Negative. HENT: Negative. Respiratory: Negative. Cardiovascular: Negative. Gastrointestinal: Negative. Genitourinary: Negative. Musculoskeletal: Negative. Neurological: Negative. Psychiatric/Behavioral: Negative. Physical Examination :     Physical Exam  Constitutional:       Appearance: Normal appearance. He is normal weight. HENT:      Head: Normocephalic and atraumatic. Pulmonary:      Effort: Pulmonary effort is normal. No respiratory distress. Musculoskeletal:         General: Normal range of motion. Comments: Right foot surgical dressing in place, not removed   Skin:     General: Skin is warm and dry. Neurological:      General: No focal deficit present. Mental Status: He is alert and oriented to person, place, and time. Mental status is at baseline. Psychiatric:         Mood and Affect: Mood normal.         Behavior: Behavior normal.         Thought Content: Thought content normal.       Laboratory data:   I have independently reviewed the followinglabs:  CBC with Differential:   Recent Labs     22  0647 22  0658   WBC 12.6* 14.0*   HGB 15.2 13.6   HCT 46.6 41.4    215   LYMPHOPCT 12* 11*   MONOPCT 10 8     BMP:   Recent Labs     22  0647 22  0658   * 134*   K 4.0 4.2   CL 93* 99   CO2 29 27   BUN 19 23   CREATININE 1.03 0.89     Hepatic Function Panel: No results for input(s): PROT, LABALBU, BILIDIR, IBILI, BILITOT, ALKPHOS, ALT, AST in the last 72 hours.       No results found for: PROCAL  Lab Results   Component Value Date/Time    .6 2022 11:35 AM     Lab Results Component Value Date    SEDRATE 80 (H) 08/18/2022         No results found for: DDIMER  No results found for: FERRITIN  No results found for: LDH  No results found for: FIBRINOGEN    No results found for requested labs within last 30 days. No results found for: COVID19    No results for input(s): VANCDEYANIRAOUGH in the last 72 hours. Imaging Studies:   Right foot xray   Impression:       Interval performance of amputation of the great toe at the level of the   metatarsal bone with tissue swelling and probable packing material long the   amputation site. Right foot MRI   Impression:       1. Soft tissue ulceration at the great toe with underlying complex rim   enhancing collection measuring 2.3 x 2.8 x 3.6 cm most compatible with   abscess. 2. Osteomyelitis of the proximal and distal phalanges of the right great toe. 3. Diffuse soft tissue edema with associated soft tissue enhancement   consistent with cellulitis. Edema is most pronounced at the soft tissues of   the great toe. Cultures:     Culture, Tissue [[id number]] (Abnormal) Collected: 08/19/22 1438   Order Status: Completed Specimen: Tissue from Foot Updated: 08/20/22 0007    Specimen Description . TOE RT GREAT    Direct Exam RARE NEUTROPHILS Abnormal      MODERATE GRAM POSITIVE COCCI IN CLUSTERS Abnormal     Culture PENDING   Culture, Blood 1 [[id number]] Collected: 08/18/22 1135   Order Status: Completed Specimen: Blood Updated: 08/19/22 1400    Specimen Description . BLOOD    Special Requests LEFT AC,12ML    Culture NO GROWTH 1 DAY   Culture, Blood 1 [[id number]] Collected: 08/18/22 1130   Order Status: Completed Specimen: Blood Updated: 08/19/22 1400    Specimen Description . BLOOD    Special Requests RFA,12ML    Culture NO GROWTH 1 DAY       Medications:      vancomycin  1,000 mg IntraVENous Q12H    insulin glargine  10 Units SubCUTAneous Nightly    insulin lispro  0-4 Units SubCUTAneous TID WC    insulin lispro  0-4 Units SubCUTAneous Nightly    sodium chloride flush  5-40 mL IntraVENous 2 times per day    enoxaparin  40 mg SubCUTAneous Daily    piperacillin-tazobactam  3,375 mg IntraVENous Q8H    vancomycin (VANCOCIN) intermittent dosing (placeholder)   Other RX Placeholder           [facility]  WALTER Nunez CNP  [employer] messaging  OFFICE: [phone]      Electronically signed by WALTER Nunez CNP on 8/20/2022 at 7:48 AM  Thank you for allowing us to participate in the care of this patient. Please call with questions. This note iscreated with the assistance of a speech recognition program.  While intending to generate a document that actually reflects the content of the visit, the document can still have some errors including those of syntax andsound a like substitutions which may escape proof reading. In such instances, actual meaning can be extrapolated by contextual diversion.

## 2022-08-20 NOTE — PLAN OF CARE
Problem: Discharge Planning  Goal: Discharge to home or other facility with appropriate resources  8/20/2022 0013 by Steffen Sr RN  Outcome: Progressing  8/19/2022 1645 by Lucero Mccormack RN  Outcome: Progressing  Flowsheets (Taken 8/19/2022 8865)  Discharge to home or other facility with appropriate resources: Identify barriers to discharge with patient and caregiver     Problem: Pain  Goal: Verbalizes/displays adequate comfort level or baseline comfort level  8/20/2022 0013 by Steffen Sr RN  Outcome: Progressing  8/19/2022 1645 by Lucero Mccormack RN  Outcome: Progressing     Problem: Safety - Adult  Goal: Free from fall injury  8/20/2022 0013 by Steffen Sr RN  Outcome: Progressing  Flowsheets (Taken 8/19/2022 2040)  Free From Fall Injury: Instruct family/caregiver on patient safety  8/19/2022 1645 by Lucero Mccormack RN  Outcome: Progressing     Problem: ABCDS Injury Assessment  Goal: Absence of physical injury  8/20/2022 0013 by Steffen Sr RN  Outcome: Progressing  Flowsheets (Taken 8/19/2022 2040)  Absence of Physical Injury: Implement safety measures based on patient assessment  8/19/2022 1645 by Lucero Mccormack RN  Outcome: Progressing     Problem: Musculoskeletal - Adult  Goal: Return mobility to safest level of function  8/20/2022 0013 by Steffen Sr RN  Outcome: Progressing  8/19/2022 1645 by Lucero Mccormack RN  Outcome: Progressing  Flowsheets (Taken 8/19/2022 0847)  Return Mobility to Safest Level of Function: Assess patient stability and activity tolerance for standing, transferring and ambulating with or without assistive devices  Goal: Return ADL status to a safe level of function  8/20/2022 0013 by Steffen Sr RN  Outcome: Progressing  8/19/2022 1645 by Lucero Mccormack RN  Outcome: Progressing  Flowsheets (Taken 8/19/2022 0847)  Return ADL Status to a Safe Level of Function: Administer medication as ordered     Problem: Metabolic/Fluid and Electrolytes - Adult  Goal: Electrolytes maintained within normal limits  8/20/2022 0013 by Mike Workman RN  Outcome: Progressing  8/19/2022 1645 by Lenny Bravo RN  Outcome: Progressing  Flowsheets (Taken 8/19/2022 2169)  Electrolytes maintained within normal limits: Monitor labs and assess patient for signs and symptoms of electrolyte imbalances  Goal: Hemodynamic stability and optimal renal function maintained  8/20/2022 0013 by Mike Workman RN  Outcome: Progressing  8/19/2022 1645 by Lenny Bravo RN  Outcome: Progressing  Flowsheets (Taken 8/19/2022 0847)  Hemodynamic stability and optimal renal function maintained: Monitor labs and assess for signs and symptoms of volume excess or deficit  Goal: Glucose maintained within prescribed range  8/20/2022 0013 by Mike Workman RN  Outcome: Progressing  8/19/2022 1645 by Lenny Bravo RN  Outcome: Progressing  Flowsheets (Taken 8/19/2022 0847)  Glucose maintained within prescribed range: Monitor blood glucose as ordered     Problem: Skin/Tissue Integrity - Adult  Goal: Incisions, wounds, or drain sites healing without S/S of infection  8/20/2022 0013 by Mike Workman RN  Outcome: Progressing  Flowsheets (Taken 8/19/2022 2040)  Incisions, Wounds, or Drain Sites Healing Without Sign and Symptoms of Infection:   TWICE DAILY: Assess and document dressing/incision, wound bed, drain sites and surrounding tissue   TWICE DAILY: Assess and document skin integrity   Implement wound care per orders  8/19/2022 1645 by Lenny Bravo RN  Outcome: Progressing  Flowsheets (Taken 8/19/2022 0847)  Incisions, Wounds, or Drain Sites Healing Without Sign and Symptoms of Infection: ADMISSION and DAILY: Assess and document risk factors for pressure ulcer development     Problem: Chronic Conditions and Co-morbidities  Goal: Patient's chronic conditions and co-morbidity symptoms are monitored and maintained or improved  8/20/2022 0013 by Mike Workman RN  Outcome: Progressing  8/19/2022 1645 by Lenny Bravo RN  Outcome: Progressing  Flowsheets (Taken 8/19/2022 8863)  Care Plan - Patient's Chronic Conditions and Co-Morbidity Symptoms are Monitored and Maintained or Improved: Monitor and assess patient's chronic conditions and comorbid symptoms for stability, deterioration, or improvement

## 2022-08-20 NOTE — PROGRESS NOTES
[address] [facility] Pharmacokinetic Monitoring Service - Vancomycin     Robin Cheung is a 78 y.o. male starting on vancomycin therapy for possible osteomyelitis. Pharmacy consulted by ORACIO Uribe for monitoring and adjustment. Target Concentration: Goal AUC/DANIELLE 400-600 mg*hr/L     Additional Antimicrobials: Zosyn    Pertinent Laboratory Values: Wt Readings from Last 1 Encounters:   08/20/22 223 lb (101.2 kg)     Temp Readings from Last 1 Encounters:   08/20/22 97.7 °F (36.5 °C) (Oral)     Estimated Creatinine Clearance: 104 mL/min (based on SCr of 0.89 mg/dL). Recent Labs     08/19/22  0647 08/20/22  0658   CREATININE 1.03 0.89   WBC 12.6* 14.0*     Procalcitonin: none    Pertinent Cultures:    MRSA Nasal Swab: N/A. Non-respiratory infection.     Recent vancomycin administrations                     vancomycin 1000 mg IVPB in 250 mL D5W addavial (mg) 1,000 mg New Bag 08/19/22 0132    vancomycin (VANCOCIN) 2,000 mg in dextrose 5 % 500 mL IVPB (mg) 2,000 mg New Bag 08/18/22 1340                    Assessment:  Date/Time Current Dose Concentration Timing of Concentration (h) AUC   8/19 at 0800 1000mg q12h 14.5 5.25hr 512   Note: Serum concentrations collected for AUC dosing may appear elevated if collected in close proximity to the dose administered, this is not necessarily an indication of toxicity    Plan:  Current dosing regimen is therapeutic - renal function improved since yesterday so AUC prediction is now 466 which is still in range  Continue current dose  Repeat vancomycin concentration will be ordered for 8/26 @ 0600 unless required sooner due to renal function or predicted AUC   Pharmacy will continue to monitor patient and adjust therapy as indicated    Thank you for the consult,  LUCIAN Tariq [facility] [place] [facility]  8/20/2022 7:57 AM

## 2022-08-20 NOTE — PLAN OF CARE
Problem: Discharge Planning  Goal: Discharge to home or other facility with appropriate resources  8/20/2022 0823 by Cher Rosenberg RN  Outcome: Progressing  8/20/2022 0013 by Alon Mason RN  Outcome: Progressing     Problem: Pain  Goal: Verbalizes/displays adequate comfort level or baseline comfort level  8/20/2022 0823 by Cher Rosenberg RN  Outcome: Progressing  8/20/2022 0013 by Alon Mason RN  Outcome: Progressing     Problem: Safety - Adult  Goal: Free from fall injury  8/20/2022 0823 by Cher Rosenberg RN  Outcome: Progressing  8/20/2022 0013 by Alon Mason RN  Outcome: Progressing  [address] (Taken 8/19/2022 2040)  Free From Fall Injury: Instruct family/caregiver on patient safety     Problem: ABCDS Injury Assessment  Goal: Absence of physical injury  8/20/2022 0823 by Cher Rosenberg RN  Outcome: Progressing  8/20/2022 0013 by Alon Mason RN  Outcome: Progressing  Flowsheets (Taken 8/19/2022 2040)  Absence of Physical Injury: Implement safety measures based on patient assessment     Problem: Musculoskeletal - Adult  Goal: Return mobility to safest level of function  8/20/2022 0823 by Cher Rosenberg RN  Outcome: Progressing  8/20/2022 0013 by Alon Mason RN  Outcome: Progressing  Goal: Return ADL status to a safe level of function  8/20/2022 0823 by Cher Rosenberg RN  Outcome: Progressing  8/20/2022 0013 by Alon Mason RN  Outcome: Progressing     Problem: Metabolic/Fluid and Electrolytes - Adult  Goal: Electrolytes maintained within normal limits  8/20/2022 0823 by Cher Rosenberg RN  Outcome: Progressing  8/20/2022 0013 by Alon Mason RN  Outcome: Progressing  Goal: Hemodynamic stability and optimal renal function maintained  8/20/2022 0823 by Cher Rosenberg RN  Outcome: Progressing  8/20/2022 0013 by Alon Mason RN  Outcome: Progressing  Goal: Glucose maintained within prescribed range  8/20/2022 0823 by Cher Rosenberg RN  Outcome: Progressing  8/20/2022 0013 by Rita Garcia Mattie Crow RN  Outcome: Progressing     Problem: Skin/Tissue Integrity - Adult  Goal: Incisions, wounds, or drain sites healing without S/S of infection  8/20/2022 3024 by Doreen Ormond, RN  Outcome: Progressing  8/20/2022 0013 by Arturo Huynh RN  Outcome: Progressing  Flowsheets (Taken 8/19/2022 2040)  Incisions, Wounds, or Drain Sites Healing Without Sign and Symptoms of Infection:   TWICE DAILY: Assess and document dressing/incision, wound bed, drain sites and surrounding tissue   TWICE DAILY: Assess and document skin integrity   Implement wound care per orders     Problem: Chronic Conditions and Co-morbidities  Goal: Patient's chronic conditions and co-morbidity symptoms are monitored and maintained or improved  8/20/2022 0823 by Doreen Ormond, RN  Outcome: Progressing  8/20/2022 0013 by Arturo Huynh RN  Outcome: Progressing

## 2022-08-20 NOTE — PROGRESS NOTES
Progress Note  Podiatric Medicine and Surgery     Subjective     CC: Right foot infection    Patient seen and examined at bedside. No acute events overnight. Afebrile, vital signs stable   S/p amputation of the right hallux yesterday    HPI :  Carroll Jones is a 78 y.o. male seen at [facility] [address] for a wound on the right foot. The patient reports that he does not follow with any doctors, does not take any medications other than vitamins, and does not have any significant past medical history, including diabetes. His wife is present at bedside and they report the wound started approximately 5 weeks ago, unknown cause, and has since become progressively worse. They have been soaking the foot in apple cider vinegar but not applying anything else to the wound. Patient states he doesn't smoke or drink alcohol. He has no complaints regarding the left foot. ROS: Denies N/V/F/C/SOB/CP. Otherwise negative except at stated in the HPI.      Medications:  Scheduled Meds:   vancomycin  1,000 mg IntraVENous Q12H    insulin glargine  10 Units SubCUTAneous Nightly    insulin lispro  0-4 Units SubCUTAneous TID WC    insulin lispro  0-4 Units SubCUTAneous Nightly    sodium chloride flush  5-40 mL IntraVENous 2 times per day    enoxaparin  40 mg SubCUTAneous Daily    piperacillin-tazobactam  3,375 mg IntraVENous Q8H    vancomycin (VANCOCIN) intermittent dosing (placeholder)   Other RX Placeholder       Continuous Infusions:   dextrose      sodium chloride         PRN Meds:glucose, dextrose bolus **OR** dextrose bolus, glucagon (rDNA), dextrose, sodium chloride flush, sodium chloride, potassium chloride **OR** potassium alternative oral replacement **OR** potassium chloride, magnesium sulfate, ondansetron **OR** ondansetron, polyethylene glycol, acetaminophen **OR** acetaminophen, HYDROcodone 5 mg - acetaminophen **OR** HYDROcodone 5 mg - acetaminophen    Objective     Vitals:  Patient Vitals for the past 8 hrs:   BP Temp Temp src Pulse Resp SpO2 Weight   22 1142 118/63 98.2 °F (36.8 °C) Oral 96 16 93 % --   22 0758 121/71 97.9 °F (36.6 °C) Oral 81 16 93 % --   22 0600 -- -- -- -- -- -- 222 lb 4.8 oz (100.8 kg)       Average, Min, and Max for last 24 hours Vitals:  TEMPERATURE:  Temp  Av.9 °F (36.6 °C)  Min: 97 °F (36.1 °C)  Max: 98.4 °F (36.9 °C)    RESPIRATIONS RANGE: Resp  Av.6  Min: 16  Max: 23    PULSE RANGE: Pulse  Av.9  Min: 74  Max: 96    BLOOD PRESSURE RANGE:  Systolic (74ZAN), DWR:423 , Min:81 , MMY:076   ; Diastolic (26MKJ), LVZ:63, Min:52, Max:71      PULSE OXIMETRY RANGE: SpO2  Av.2 %  Min: 90 %  Max: 94 %    I/O last 3 completed shifts: In: 3484.5 [P.O.:400; I.V.:1602.8; IV Piggyback:1481.7]  Out: 1775 [Urine:1775]    CBC:  Recent Labs     22  1135 22  0647 22  0658   WBC 12.1* 12.6* 14.0*   HGB 15.0 15.2 13.6   HCT 44.8 46.6 41.4    221 215   .6*  --   --           BMP:  Recent Labs     22  1135 22  0647 22  0658   * 132* 134*   K 4.2 4.0 4.2   CL 93* 93* 99   CO2 27 29 27   BUN 20 19 23   CREATININE 0.88 1.03 0.89   GLUCOSE 376* 268* 278*   CALCIUM 9.4 9.5 8.8          Coags:  No results for input(s): APTT, PROT, INR in the last 72 hours. Lab Results   Component Value Date    SEDRATE 81 (H) 2022     Recent Labs     22  1135   .6*         Lower Extremity Physical Exam:   Vascular: DP and PT pulses are Palpable . CFT <3 seconds to all digits. Hair growth is absent to the level of the digits. Moderate non-pitting edema to the right lower extremity. Neuro: Saph/sural/SP/DP/plantar sensation diminished to light touch. Musculoskeletal: Muscle strength is adequate ROM, adequate strength to all lower extremity muscle groups. Gross deformity is absent. Compartments compressible. Dermatologic: Iatrogenic wound noted to the amputation site of the hallux which was left open for drainage.   No purulence noted today, skin edges appear healthy without necrotic tissue. Erythema significantly improved since yesterday. Clinical Images:  No new images    Imaging:   XR FOOT RIGHT (MIN 3 VIEWS)   Final Result   Interval performance of amputation of the great toe at the level of the   metatarsal bone with tissue swelling and probable packing material long the   amputation site. MRI FOOT RIGHT W WO CONTRAST   Final Result   1. Soft tissue ulceration at the great toe with underlying complex rim   enhancing collection measuring 2.3 x 2.8 x 3.6 cm most compatible with   abscess. 2. Osteomyelitis of the proximal and distal phalanges of the right great toe. 3. Diffuse soft tissue edema with associated soft tissue enhancement   consistent with cellulitis. Edema is most pronounced at the soft tissues of   the great toe. XR FOOT RIGHT (MIN 3 VIEWS)   Final Result   Mildly heterogeneous attenuation of the 1st distal phalanx and osteomyelitis   is a consideration. Diffuse soft tissue swelling with soft tissue laceration or defect of the 1st   digit. Cultures: pending     Assessment   Margo Morgan is a 78 y.o. male with   Status post amputation of right hallux   diabetic foot ulcer to the level of tendon; right hallux  Osteomyelitis of right hallux  Cellulitis; RLE  Undiagnosed DM2 secondary to peripheral neuropathy    Principal Problem:    Osteomyelitis ([address]  Active Problems:    Hyperglycemia    Cellulitis    New onset type 2 diabetes mellitus ([address]  Resolved Problems:    * No resolved hospital problems. *       Plan     Patient examined and evaluated at bedside. Cultures obtained: pending  Continue medical management per Internal Medicine. Abx: Per infectious disease  Dressing to right foot changed. Half-inch plain packing, DSD.   Plan for daily dressing changes over the next couple days, will plan for delayed primary closure early next week PWB to Right lower extremity in surgical shoe.  Patient seen and discussed with  Dr. Manohar Wilhelm.     Cole Briseno DPM   Podiatric Medicine & Surgery   8/20/2022 at 1:48 PM

## 2022-08-20 NOTE — PROGRESS NOTES
[facility]  Office: [address], DO, Ion Early DO, Andres Juan, DO, Bethany Poole Blood, DO, Margo Silva MD, Bijan Rogers MD, Lissette Marks MD, Anastacio Youngblood MD,  Rohit Oquendo MD, Valeria Burch MD, Joanne Hurt, DO, Nish Sherman MD,  Kristin Scott MD, Odalys Hawkins MD, Katie Kramer DO, Lupe Olson MD, Lucia Malave MD, Mitesh Umana MD, Uzair Garay DO, Lorri Kan MD, Elsa Knight MD, Kimberly Solitario CNP,  Ema Cook CNP, Merari Rodriguez CNP, Rhiannon Velazquez CNP, Milind Contreras PA-C, Nanci Lopez, DNP, James Kelsey, CNP, Nella Barrera, CNP, Rosio Beaulieu, CNP, Jeferson Roca, CNP, Belton Severs, CNP, Arya Horton, CNS, Cleo Cannon, [facility], Ni Knight, CNP, Debra Llamas, CNP, Zane Bedoya, CNP           [facility]    Progress Note    8/20/2022    3:57 PM    Name:   Carroll Jones  MRN:     [id number]     Acct:      [de-identified]   Room:   2014/2014-02  IP Day:  2  Admit Date:  8/18/2022 10:54 AM    PCP:   No primary care provider on file. Code Status:  Full Code    Subjective:     C/C:   Chief Complaint   Patient presents with    Wound Infection     Right foot infection     Interval History Status: improved. Patient is resting comfortably denies any complaints of chest pain, shortness of breath, nausea vomiting, fevers chills or acute complaints. He is anxiously waiting discharge. Brief History:     Carroll Jones is a 78 y.o. male who presents with redness and wound to the bottom of his right great toe and is admitted to the hospital for the management of Osteomyelitis. About 5 weeks ago he noticed that his toenail was falling off and it was bleeding and he noticed a wound to the bottom of his toe although he denies injury.   He said over the last several weeks its gotten progressively worse and [place] he noticed foul smelling drainage and worsening swelling and redness. Denies any pain. He reports he has not seen a doctor in many years and does not take any medications at home. X-ray of right foot shows concern for osteomyelitis and soft tissue swelling. Podiatry was consulted for further evaluation in the ED. blood sugars were noted to be in the 300s and there is concern for undiagnosed diabetes    Review of Systems:     Constitutional:  negative for chills, fevers, sweats  Respiratory:  negative for cough, dyspnea on exertion, shortness of breath, wheezing  Cardiovascular:  negative for chest pain, chest pressure/discomfort, lower extremity edema, palpitations  Gastrointestinal:  negative for abdominal pain, constipation, diarrhea, nausea, vomiting  Neurological:  negative for dizziness, headache    Medications: Allergies:  No Known Allergies    Current Meds:   Scheduled Meds:    vancomycin  1,000 mg IntraVENous Q12H    insulin glargine  10 Units SubCUTAneous Nightly    insulin lispro  0-4 Units SubCUTAneous TID WC    insulin lispro  0-4 Units SubCUTAneous Nightly    sodium chloride flush  5-40 mL IntraVENous 2 times per day    enoxaparin  40 mg SubCUTAneous Daily    piperacillin-tazobactam  3,375 mg IntraVENous Q8H    vancomycin (VANCOCIN) intermittent dosing (placeholder)   Other RX Placeholder     Continuous Infusions:    dextrose      sodium chloride       PRN Meds: glucose, dextrose bolus **OR** dextrose bolus, glucagon (rDNA), dextrose, sodium chloride flush, sodium chloride, potassium chloride **OR** potassium alternative oral replacement **OR** potassium chloride, magnesium sulfate, ondansetron **OR** ondansetron, polyethylene glycol, acetaminophen **OR** acetaminophen, HYDROcodone 5 mg - acetaminophen **OR** HYDROcodone 5 mg - acetaminophen    Data:     Past Medical History:   has no past medical history on file. Social History:   reports that he has never smoked.  He has never used smokeless tobacco. He reports that he does not use drugs. Family History:   Family History   Problem Relation Age of Onset    No Known Problems Mother     No Known Problems Father        Vitals:  /83   Pulse 79   Temp 98.2 °F (36.8 °C) (Oral)   Resp 16   Ht 6' 2\" (1.88 m)   Wt 222 lb 4.8 oz (100.8 kg)   SpO2 94%   BMI 28.54 kg/m²   Temp (24hrs), Av.1 °F (36.7 °C), Min:97.7 °F (36.5 °C), Max:98.4 °F (36.9 °C)    Recent Labs     22  1603 22  0605 22  1155   POCGLU 247* 345* 287* 324*       I/O (24Hr): Intake/Output Summary (Last 24 hours) at 2022 1557  Last data filed at 2022 0610  Gross per 24 hour   Intake 2748.75 ml   Output 1125 ml   Net 1623.75 ml       Labs:  Hematology:  Recent Labs     22  1135 22  0647 22  0658   WBC 12.1* 12.6* 14.0*   RBC 4.90 5.02 4.48   HGB 15.0 15.2 13.6   HCT 44.8 46.6 41.4   MCV 91.4 92.8 92.4   MCH 30.6 30.3 30.4   MCHC 33.5 32.6 32.9   RDW 12.0 12.1 12.0    221 215   MPV 9.7 9.7 9.8   SEDRATE 81*  --   --    .6*  --   --      Chemistry:  Recent Labs     22  1135 22  0647 22  0658   * 132* 134*   K 4.2 4.0 4.2   CL 93* 93* 99   CO2 27 29 27   GLUCOSE 376* 268* 278*   BUN 20 19 23   CREATININE 0.88 1.03 0.89   ANIONGAP 11 10 8*   LABGLOM >60 >60 >60   GFRAA >60 >60 >60   CALCIUM 9.4 9.5 8.8     Recent Labs     22  1135 22  1351 22  0556 22  1156 22  1603 22  2008 22  0605 22  1155   LABA1C 12.7*  --   --   --   --   --   --   --    POCGLU  --    < > 292* 222* 247* 345* 287* 324*    < > = values in this interval not displayed.      ABG:No results found for: POCPH, PHART, PH, POCPCO2, KGV1EZL, PCO2, POCPO2, PO2ART, PO2, POCHCO3, DLX8CON, HCO3, NBEA, PBEA, BEART, BE, THGBART, THB, EWO1BAQ, OYEI4XYB, Q0QOUHWP, O2SAT, FIO2  Lab Results   Component Value Date/Time    SPECIAL LEFT AC,12ML 2022 11:35 AM     Lab Results   Component Value Date/Time    CULTURE GRAM NEGATIVE RODS LIGHT GROWTH (A) 08/19/2022 02:38 PM       Radiology:  XR FOOT RIGHT (MIN 3 VIEWS)    Result Date: 8/19/2022  Interval performance of amputation of the great toe at the level of the metatarsal bone with tissue swelling and probable packing material long the amputation site. XR FOOT RIGHT (MIN 3 VIEWS)    Result Date: 8/18/2022  Mildly heterogeneous attenuation of the 1st distal phalanx and osteomyelitis is a consideration. Diffuse soft tissue swelling with soft tissue laceration or defect of the 1st digit. MRI FOOT RIGHT W WO CONTRAST    Result Date: 8/18/2022  1. Soft tissue ulceration at the great toe with underlying complex rim enhancing collection measuring 2.3 x 2.8 x 3.6 cm most compatible with abscess. 2. Osteomyelitis of the proximal and distal phalanges of the right great toe. 3. Diffuse soft tissue edema with associated soft tissue enhancement consistent with cellulitis. Edema is most pronounced at the soft tissues of the great toe.        Physical Examination:        General appearance:  alert, cooperative and no distress  Mental Status:  oriented to person, place and time and normal affect  Lungs:  clear to auscultation bilaterally, normal effort  Heart:  regular rate and rhythm, no murmur  Abdomen:  soft, nontender, nondistended, normal bowel sounds, no masses, hepatomegaly, splenomegaly  Extremities:  no edema, redness, tenderness in the calves foot dressing in place  Skin:  no gross lesions, rashes, induration    Assessment:        Hospital Problems             Last Modified POA    * (Principal) Acute osteomyelitis of toe of right foot ([address] 8/20/2022 Yes    Hyperglycemia 8/18/2022 Yes    Cellulitis 8/19/2022 Yes    New onset type 2 diabetes mellitus ([address] 8/19/2022 Yes    Type 2 diabetes mellitus with diabetic foot infection ([address] 8/20/2022 Yes       Plan:        Continue vancomycin and Zosyn pending culture data  Increase Lantus to 20 units nightly  Insulin scale optimize glycemic control  Will need outpatient diabetic education, eye exam etc. for new onset diabetes  GI DVT prophylaxis  Await cultures for final determination on antibiotics  Patient advised it may take another 1 to 4 days for culture data and decision regarding antibiotics and to establish coverage for IV antibiotics as an outpatient if needed.     Domenick Lundborg, DO  8/20/2022  3:57 PM

## 2022-08-20 NOTE — PROGRESS NOTES
Physical Therapy  Facility/Department: [facility] MED SURG  Physical Therapy Initial Assessment    Name: Gretel Alvarez  : 1955  MRN: [id number]  Date of Service: 2022    Discharge Recommendations:  Home with assist PRN      HPI (per chart):   Gretel Alvarez is a 78 y.o. male seen at [address] [place] [address],[address] for a wound on the right foot. The patient reports that he does not follow with any doctors, does not take any medications other than vitamins, and does not have any significant past medical history, including diabetes. His wife is present at bedside and they report the wound started approximately 5 weeks ago, unknown cause, and has since become progressively worse. They have been soaking the foot in apple cider vinegar but not applying anything else to the wound. Patient Diagnosis(es): The primary encounter diagnosis was Diabetic foot infection ([facility] A diagnosis of Osteomyelitis, unspecified site, unspecified type [facility]) was also pertinent to this visit. Past Medical History:  has no past medical history on file. Past Surgical History:  has a past surgical history that includes Tonsillectomy and Toe amputation (Right, 2022). Assessment   Body Structures, Functions, Activity Limitations Requiring Skilled Therapeutic Intervention: Decreased functional mobility ; Decreased strength;Decreased balance  Assessment: Patient demo good understanding of WB restriction and is compliant. Patient will benefit from skilled PT to improve gait and stair negotiation.   Therapy Prognosis: Good  Decision Making: Medium Complexity  Requires PT Follow-Up: Yes  Activity Tolerance  Activity Tolerance: Patient tolerated treatment well     Plan   Plan  Plan: 5-7 times per week  Current Treatment Recommendations: Strengthening, Balance training, Functional mobility training, Endurance training, Transfer training, Gait training, Stair training, Home exercise program, Safety education & training, Patient/Caregiver education & training, Therapeutic activities  Safety Devices  Type of Devices: Call light within reach, Gait belt, Left in bed, Nurse notified     Restrictions  Restrictions/Precautions  Restrictions/Precautions: General Precautions, Weight Bearing  Required Braces or Orthoses?: Yes  Lower Extremity Weight Bearing Restrictions  Partial Weight Bearing Percentage Or Pounds: PWB/ Heel WB  Required Braces or Orthoses  Right Lower Extremity Brace:  (sx shoe)     Subjective   General  Chart Reviewed: Yes  Patient assessed for rehabilitation services?: Yes  Additional Pertinent Hx: None  Response To Previous Treatment: Not applicable  Family / Caregiver Present: No  Diagnosis: right foot osteomyelitis, cellulitis, Right hallux amp, undiagnosed DM  Follows Commands: Within Functional Limits  General Comment  Comments: Daren Caputo RN reports patient medically appropriate for PT. Subjective  Subjective: Patient very pleasant and agreeable to PT. Social/Functional History  Social/Functional History  Lives With: Spouse  Type of Home: House  Home Layout: Two level, Able to Live on Main level with bedroom/bathroom  Home Access: Stairs to enter with rails  Entrance Stairs - Number of Steps: 2  Entrance Stairs - Rails: Right  Bathroom Shower/Tub: Tub/Shower unit, Walk-in shower (walk in shower on first floor has a seat)  Bathroom Toilet: Standard  Home Equipment: Elif Number, rolling  Has the patient had two or more falls in the past year or any fall with injury in the past year?: No (Patient feel earlier this week, reports his legs got weak and gave out. Patient thinks he was just weak from infection.)  ADL Assistance: Independent  Homemaking Assistance: Independent  Ambulation Assistance: Independent  Transfer Assistance: Independent  Active : Yes  Occupation: Retired  Type of Occupation: [email or web address], [employer]: did research  Leisure & Hobbies:  Yard work, errands, keeps busy  Vision/Hearing  Vision  Vision: Impaired  Vision Exceptions: Wears glasses for reading  Hearing  Hearing: Within functional limits    Cognition   Orientation  Overall Orientation Status: Within Normal Limits  Cognition  Overall Cognitive Status: WNL     Objective      Observation/Palpation  Posture: Good  Observation: Dressing on right foot, surgical shoe  Gross Assessment  Sensation: Impaired (decreased sensation in hands and feet)     AROM RLE (degrees)  RLE General AROM: hip and knee WFL, ankle not assessed  AROM LLE (degrees)  LLE AROM : WFL  Strength RLE  Comment: hip 4/5, knee 4/5, ankle not  Strength LLE  Comment: 5/5           Bed mobility  Bridging: Independent  Rolling to Left: Independent  Rolling to Right: Independent  Supine to Sit: Independent  Sit to Supine: Independent  Scooting: Independent  Transfers  Sit to Stand: Independent  Stand to sit: Independent  Bed to Chair: Independent  Stand Pivot Transfers: Independent  Comment: Patient educated to push up from bed / reach back for bed with transfers, educated to maintain heel only weight bearing with transfers. Ambulation  WB Status: Heel WB / Partial WB  Ambulation  Surface: level tile  Device: Rolling Walker  Other Apparatus:  (surgical shoe)  Assistance: Supervision  Quality of Gait: Patient demo good compliance keeping weight of of incision. Patient indep with short distance from bed to bathroom. supervision with longer distances to make sure he does not put weight on incision with fatigue.   Gait Deviations: Slow Lisa;Decreased step length  Distance: 50 feet     Balance  Posture: Good  Sitting - Static: Good  Sitting - Dynamic: Good  Standing - Static: Good (RW)  Standing - Dynamic: Good;- (RW)           OutComes Score    AM-PAC Score  AM-PAC Inpatient Mobility Raw Score : 22 (08/20/22 1551)  AM-PAC Inpatient T-Scale Score : 53.28 (08/20/22 1551)  Mobility Inpatient CMS 0-100% Score: 20.91 (08/20/22 1551)  Mobility Inpatient CMS G-Code Modifier : CJ (08/20/22 1551)          Tinneti Score Goals  Short Term Goals  Time Frame for Short term goals: 12 visits  Short term goal 1: Patient will amb 100 feet indep with RW and 100% compliant with heel WB with surgical shoe. Short term goal 2: Patient will negoatiate 2 stairs with rails and indep with compliance with heel WB. Short term goal 3: Pateint will have good dynamic standing balance.   Patient Goals   Patient goals : Return home, wound healing       Education  Patient Education  Education Given To: Patient  Education Provided: Role of Therapy;Plan of Care;Transfer Training  Education Provided Comments: WB restriction, importance of checking feet daily due to DM  Education Method: Demonstration;Verbal  Barriers to Learning: None  Education Outcome: Verbalized understanding;Demonstrated understanding      Therapy Time   Individual Concurrent Group Co-treatment   Time In 1500         Time Out 1532         Minutes 32         Timed Code Treatment Minutes: 1100 Lien Reed, PT

## 2022-08-21 LAB
ABSOLUTE EOS #: 0.13 K/UL (ref 0–0.44)
ABSOLUTE IMMATURE GRANULOCYTE: 0.18 K/UL (ref 0–0.3)
ABSOLUTE LYMPH #: 2.02 K/UL (ref 1.1–3.7)
ABSOLUTE MONO #: 0.92 K/UL (ref 0.1–1.2)
ANION GAP SERPL CALCULATED.3IONS-SCNC: 8 MMOL/L (ref 9–17)
BASOPHILS # BLD: 0 % (ref 0–2)
BASOPHILS ABSOLUTE: 0.04 K/UL (ref 0–0.2)
BUN BLDV-MCNC: 19 MG/DL (ref 8–23)
BUN/CREAT BLD: 19 (ref 9–20)
CALCIUM SERPL-MCNC: 8.4 MG/DL (ref 8.6–10.4)
CHLORIDE BLD-SCNC: 101 MMOL/L (ref 98–107)
CO2: 28 MMOL/L (ref 20–31)
CREAT SERPL-MCNC: 1.02 MG/DL (ref 0.7–1.2)
EOSINOPHILS RELATIVE PERCENT: 1 % (ref 1–4)
GFR AFRICAN AMERICAN: >60 ML/MIN
GFR NON-AFRICAN AMERICAN: >60 ML/MIN
GFR SERPL CREATININE-BSD FRML MDRD: ABNORMAL ML/MIN/{1.73_M2}
GLUCOSE BLD-MCNC: 206 MG/DL (ref 75–110)
GLUCOSE BLD-MCNC: 224 MG/DL (ref 75–110)
GLUCOSE BLD-MCNC: 227 MG/DL (ref 75–110)
GLUCOSE BLD-MCNC: 231 MG/DL (ref 70–99)
GLUCOSE BLD-MCNC: 257 MG/DL (ref 75–110)
HCT VFR BLD CALC: 41.6 % (ref 40.7–50.3)
HEMOGLOBIN: 13.7 G/DL (ref 13–17)
IMMATURE GRANULOCYTES: 2 %
LYMPHOCYTES # BLD: 20 % (ref 24–43)
MCH RBC QN AUTO: 30.5 PG (ref 25.2–33.5)
MCHC RBC AUTO-ENTMCNC: 32.9 G/DL (ref 28.4–34.8)
MCV RBC AUTO: 92.7 FL (ref 82.6–102.9)
MONOCYTES # BLD: 9 % (ref 3–12)
NRBC AUTOMATED: 0 PER 100 WBC
PDW BLD-RTO: 12.2 % (ref 11.8–14.4)
PLATELET # BLD: 236 K/UL (ref 138–453)
PMV BLD AUTO: 9.8 FL (ref 8.1–13.5)
POTASSIUM SERPL-SCNC: 3.9 MMOL/L (ref 3.7–5.3)
RBC # BLD: 4.49 M/UL (ref 4.21–5.77)
SEG NEUTROPHILS: 68 % (ref 36–65)
SEGMENTED NEUTROPHILS ABSOLUTE COUNT: 6.75 K/UL (ref 1.5–8.1)
SODIUM BLD-SCNC: 137 MMOL/L (ref 135–144)
WBC # BLD: 10 K/UL (ref 3.5–11.3)

## 2022-08-21 PROCEDURE — 80048 BASIC METABOLIC PNL TOTAL CA: CPT

## 2022-08-21 PROCEDURE — 6360000002 HC RX W HCPCS

## 2022-08-21 PROCEDURE — 99232 SBSQ HOSP IP/OBS MODERATE 35: CPT | Performed by: INTERNAL MEDICINE

## 2022-08-21 PROCEDURE — 82947 ASSAY GLUCOSE BLOOD QUANT: CPT

## 2022-08-21 PROCEDURE — 2580000003 HC RX 258

## 2022-08-21 PROCEDURE — 85025 COMPLETE CBC W/AUTO DIFF WBC: CPT

## 2022-08-21 PROCEDURE — 36415 COLL VENOUS BLD VENIPUNCTURE: CPT

## 2022-08-21 PROCEDURE — 1200000000 HC SEMI PRIVATE

## 2022-08-21 PROCEDURE — 99232 SBSQ HOSP IP/OBS MODERATE 35: CPT | Performed by: NURSE PRACTITIONER

## 2022-08-21 PROCEDURE — 6370000000 HC RX 637 (ALT 250 FOR IP)

## 2022-08-21 PROCEDURE — 6370000000 HC RX 637 (ALT 250 FOR IP): Performed by: INTERNAL MEDICINE

## 2022-08-21 RX ORDER — PEN NEEDLE, DIABETIC 31 GX5/16"
1 NEEDLE, DISPOSABLE MISCELLANEOUS DAILY
Qty: 100 EACH | Refills: 3 | Status: CANCELLED | OUTPATIENT
Start: 2022-08-21

## 2022-08-21 RX ADMIN — INSULIN LISPRO 1 UNITS: 100 INJECTION, SOLUTION INTRAVENOUS; SUBCUTANEOUS at 12:06

## 2022-08-21 RX ADMIN — PIPERACILLIN AND TAZOBACTAM 3375 MG: 3; .375 INJECTION, POWDER, FOR SOLUTION INTRAVENOUS at 21:52

## 2022-08-21 RX ADMIN — PIPERACILLIN AND TAZOBACTAM 3375 MG: 3; .375 INJECTION, POWDER, FOR SOLUTION INTRAVENOUS at 06:22

## 2022-08-21 RX ADMIN — PIPERACILLIN AND TAZOBACTAM 3375 MG: 3; .375 INJECTION, POWDER, FOR SOLUTION INTRAVENOUS at 14:00

## 2022-08-21 RX ADMIN — VANCOMYCIN HYDROCHLORIDE 1000 MG: 1 INJECTION, POWDER, LYOPHILIZED, FOR SOLUTION INTRAVENOUS at 15:59

## 2022-08-21 RX ADMIN — INSULIN LISPRO 1 UNITS: 100 INJECTION, SOLUTION INTRAVENOUS; SUBCUTANEOUS at 08:32

## 2022-08-21 RX ADMIN — SODIUM CHLORIDE, PRESERVATIVE FREE 10 ML: 5 INJECTION INTRAVENOUS at 21:48

## 2022-08-21 RX ADMIN — VANCOMYCIN HYDROCHLORIDE 1000 MG: 1 INJECTION, POWDER, LYOPHILIZED, FOR SOLUTION INTRAVENOUS at 02:15

## 2022-08-21 RX ADMIN — ENOXAPARIN SODIUM 40 MG: 100 INJECTION SUBCUTANEOUS at 08:32

## 2022-08-21 RX ADMIN — INSULIN LISPRO 1 UNITS: 100 INJECTION, SOLUTION INTRAVENOUS; SUBCUTANEOUS at 16:54

## 2022-08-21 RX ADMIN — INSULIN GLARGINE 20 UNITS: 100 INJECTION, SOLUTION SUBCUTANEOUS at 21:47

## 2022-08-21 ASSESSMENT — ENCOUNTER SYMPTOMS
GASTROINTESTINAL NEGATIVE: 1
RESPIRATORY NEGATIVE: 1

## 2022-08-21 NOTE — PLAN OF CARE
Problem: Discharge Planning  Goal: Discharge to home or other facility with appropriate resources  Outcome: Progressing     Problem: Pain  Goal: Verbalizes/displays adequate comfort level or baseline comfort level  Outcome: Progressing     Problem: Safety - Adult  Goal: Free from fall injury  Outcome: Progressing  Flowsheets (Taken 8/20/2022 2010)  Free From Fall Injury: Instruct family/caregiver on patient safety     Problem: ABCDS Injury Assessment  Goal: Absence of physical injury  Outcome: Progressing  Flowsheets (Taken 8/20/2022 2010)  Absence of Physical Injury: Implement safety measures based on patient assessment     Problem: Musculoskeletal - Adult  Goal: Return mobility to safest level of function  Outcome: Progressing  Goal: Return ADL status to a safe level of function  Outcome: Progressing     Problem: Metabolic/Fluid and Electrolytes - Adult  Goal: Electrolytes maintained within normal limits  Outcome: Progressing  Goal: Hemodynamic stability and optimal renal function maintained  Outcome: Progressing  Goal: Glucose maintained within prescribed range  Outcome: Progressing     Problem: Skin/Tissue Integrity - Adult  Goal: Incisions, wounds, or drain sites healing without S/S of infection  Outcome: Progressing  Flowsheets (Taken 8/20/2022 2010)  Incisions, Wounds, or Drain Sites Healing Without Sign and Symptoms of Infection:   TWICE DAILY: Assess and document dressing/incision, wound bed, drain sites and surrounding tissue   TWICE DAILY: Assess and document skin integrity   Initiate pressure ulcer prevention bundle as indicated     Problem: Chronic Conditions and Co-morbidities  Goal: Patient's chronic conditions and co-morbidity symptoms are monitored and maintained or improved  Outcome: Progressing     Problem: Skin/Tissue Integrity  Goal: Absence of new skin breakdown  Description: 1. Monitor for areas of redness and/or skin breakdown  2. Assess vascular access sites hourly  3.   Every 4-6 hours

## 2022-08-21 NOTE — CARE COORDINATION
Discharge planning    Wound closure this week. Patient is a new diabetic and will need supplies. Per RN. Diabetic education scheduled and diabetic management ordered as well. Continue to follow.

## 2022-08-21 NOTE — PROGRESS NOTES
[facility]  Progress Note    Bhavin Olsen  MRN: [id number]  Date: 8/21/2022  LOS: 3     Reason for F/U :   Diabetic foot ulcer    Impression :   Right great toe diabetic foot ulcer with associated infection, osteomyelitis and abscess  Status post right foot hallux amputation 8/19/2022  Diabetes mellitus type 2 with associated peripheral neuropathy    Recommendations: The patient continues on IV vancomycin and Zosyn  Cultures are pending, thus far with gram-negative rods and moderate gram-positive cocci  We will continue to follow operative findings, surgical culture data and adjust therapy accordingly    Infection Control Recommendations:   Universal precautions    Discharge Planning:   Estimated Length of IV antimicrobials: To be determined  Patient will need Midline Catheter Insertion/ PICC line Insertion: No  Patient will need: Home IV , [place],  SNF,  LTAC: Undetermined  Patient willneed outpatient wound care: No    Medical Decision making / Summary of Stay:   Bhavin Olsen is a 78y.o.-year-old male who was initially admitted on 8/18/2022. Inessa Pruitt is seen and evaluated at bedside and his wife was in the room at the time of my evaluation. He reports that he developed a wound on his right great toe about 5 weeks ago that he had not sought any medical treatment for. He then reports that he was walking for quite some time and visiting with his grandkids and since that time his wound has become progressively worse with soft tissue swelling and he reports that since he has been admitted he has had some fevers and chills but did not have any at home. Patient has since been admitted seen by the podiatry service and started on broad-spectrum antimicrobial therapy. Work-up included MRI imaging that has shown osteomyelitis and the patient is scheduled for surgery later today. I was asked to evaluate and help with antibiotic choice.        Current evaluation:8/21/2022    BP 136/82   Pulse 85   Temp 97.9 °F (36.6 °C) (Oral)   Resp 17   Ht 6' 2\" (1.88 m)   Wt 222 lb 4.8 oz (100.8 kg)   SpO2 93%   BMI 28.54 kg/m²     Temperature Range: Temp: 97.9 °F (36.6 °C) Temp  Av °F (36.7 °C)  Min: 97.5 °F (36.4 °C)  Max: 98.2 °F (36.8 °C)    The patient was seen sitting up in the bathroom after getting washed up  Spouse is in the room, I did answer several of her questions  The patient denies any needs currently, dressing remains in place    Review of Systems   Constitutional: Negative. HENT: Negative. Respiratory: Negative. Cardiovascular: Negative. Gastrointestinal: Negative. Genitourinary: Negative. Musculoskeletal: Negative. Neurological: Negative. Psychiatric/Behavioral: Negative. Physical Examination :     Physical Exam  Constitutional:       Appearance: Normal appearance. He is normal weight. HENT:      Head: Normocephalic and atraumatic. Pulmonary:      Effort: Pulmonary effort is normal. No respiratory distress. Musculoskeletal:         General: Normal range of motion. Comments: Right foot surgical dressing in place, not removed   Skin:     General: Skin is warm and dry. Neurological:      General: No focal deficit present. Mental Status: He is alert and oriented to person, place, and time. Mental status is at baseline. Psychiatric:         Mood and Affect: Mood normal.         Behavior: Behavior normal.         Thought Content:  Thought content normal.       Laboratory data:   I have independently reviewed the followinglabs:  CBC with Differential:   Recent Labs     22  0658 22  0553   WBC 14.0* 10.0   HGB 13.6 13.7   HCT 41.4 41.6    236   LYMPHOPCT 11* 20*   MONOPCT 8 9       BMP:   Recent Labs     22  0658 22  0553   * 137   K 4.2 3.9   CL 99 101   CO2 27 28   BUN 23 19   CREATININE 0.89 1.02       Hepatic Function Panel: No results for input(s): PROT, LABALBU, BILIDIR, IBILI, BILITOT, ALKPHOS, ALT, AST in the last 72 hours. No results found for: PROCAL  Lab Results   Component Value Date/Time    .6 08/18/2022 11:35 AM     Lab Results   Component Value Date    SEDRATE 81 (H) 08/18/2022         No results found for: DDIMER  No results found for: FERRITIN  No results found for: LDH  No results found for: FIBRINOGEN    No results found for requested labs within last 30 days. No results found for: COVID19    No results for input(s): VANCOTROUGH in the last 72 hours. Imaging Studies:   No new imaging  Cultures:     Culture, Tissue [[id number]] (Abnormal) Collected: 08/19/22 1438   Order Status: Completed Specimen: Tissue from Foot Updated: 08/20/22 1449    Specimen Description . TOE RT GREAT    Direct Exam RARE NEUTROPHILS Abnormal      MODERATE GRAM POSITIVE COCCI IN CLUSTERS Abnormal     Culture GRAM NEGATIVE RODS LIGHT GROWTH Abnormal    Culture, Blood 1 [[id number]] Collected: 08/18/22 1135   Order Status: Completed Specimen: Blood Updated: 08/20/22 1400    Specimen Description . BLOOD    Special Requests LEFT AC,12ML    Culture NO GROWTH 2 DAYS   Culture, Blood 1 [[id number]] Collected: 08/18/22 1130   Order Status: Completed Specimen: Blood Updated: 08/20/22 1400    Specimen Description . BLOOD    Special Requests RFA,12ML    Culture NO GROWTH 2 DAYS       Medications:      insulin glargine  20 Units SubCUTAneous Nightly    vancomycin  1,000 mg IntraVENous Q12H    insulin lispro  0-4 Units SubCUTAneous TID WC    insulin lispro  0-4 Units SubCUTAneous Nightly    sodium chloride flush  5-40 mL IntraVENous 2 times per day    enoxaparin  40 mg SubCUTAneous Daily    piperacillin-tazobactam  3,375 mg IntraVENous Q8H    vancomycin (VANCOCIN) intermittent dosing (placeholder)   Other RX Placeholder           [facility]  WALTER Longoria CNP  Perfect [employer] messaging  OFFICE: [phone]      Electronically signed by WALTER Longoria CNP on 8/21/2022 at 8:46 AM  Thank you for allowing us to participate in the care of this patient. Please call with questions. This note iscreated with the assistance of a speech recognition program.  While intending to generate a document that actually reflects the content of the visit, the document can still have some errors including those of syntax andsound a like substitutions which may escape proof reading. In such instances, actual meaning can be extrapolated by contextual diversion.

## 2022-08-21 NOTE — PROGRESS NOTES
[facility]  Office: [address], DO, Florideidra Mccallum, DO, Sangitasang Moore, DO, Garret Tran Blood, DO, Brittny Borjas MD, Сергей Lara MD, Robyn Elliott MD, uRth Bojorquez MD,  Alice Rangel MD, Jagdish Ulloa MD, Staci Brown, DO, Wayne Garcia MD,  Robyn Caraballo MD, Abdelrahman Cintron MD, Lady Kaplan, DO, Mychal Yi MD, Ni Thayer MD, Meri Mclaughlin MD, Landen Burton DO, Vladimir Sanchez MD, Roland Bedoya MD, Alferd Felty, CNP,  Annetta Zuleta, CNP, Ethyl Mercury, CNP, Debra Gary, [facility], Ramos Prince PA-C, Ninoska Villareal, [facility], Jacobo Perez, CNP, Tin Hale, CNP, Mauricio Brittle, CNP, Huber Watkins, CNP, Valeria Nick, CNP, Lynne Chavira, CNS, Baldo Colunga, [facility], Felix Sinha, CNP, Paty Muse, [facility], Farrah Vasquez, [facility]    Progress Note    8/21/2022    12:08 PM    Name:   Charles Larry  MRN:     [id number]     Michellelyside:      [de-identified]   Room:   2014/2014-02  IP Day:  3  Admit Date:  8/18/2022 10:54 AM    PCP:   No primary care provider on file. Code Status:  Full Code    Subjective:     C/C:   Chief Complaint   Patient presents with    Wound Infection     Right foot infection     Interval History Status: improved. Patient is resting company denies any complaints of chest pain, shortness of breath, nausea vomiting, fevers or chills or acute complaints. Awaiting final determination of antibiotic needs. Brief History:     Charles Larry is a 78 y.o. male who presents with redness and wound to the bottom of his right great toe and is admitted to the hospital for the management of Osteomyelitis. About 5 weeks ago he noticed that his toenail was falling off and it was bleeding and he noticed a wound to the bottom of his toe although he denies injury.   He said over the last several weeks its gotten progressively worse and [place] he noticed foul smelling drainage and worsening swelling and redness. Denies any pain. He reports he has not seen a doctor in many years and does not take any medications at home. X-ray of right foot shows concern for osteomyelitis and soft tissue swelling. Podiatry was consulted for further evaluation in the ED. blood sugars were noted to be in the 300s and there is concern for undiagnosed diabetes    Review of Systems:     Constitutional:  negative for chills, fevers, sweats  Respiratory:  negative for cough, dyspnea on exertion, shortness of breath, wheezing  Cardiovascular:  negative for chest pain, chest pressure/discomfort, lower extremity edema, palpitations  Gastrointestinal:  negative for abdominal pain, constipation, diarrhea, nausea, vomiting  Neurological:  negative for dizziness, headache    Medications: Allergies:  No Known Allergies    Current Meds:   Scheduled Meds:    insulin glargine  20 Units SubCUTAneous Nightly    vancomycin  1,000 mg IntraVENous Q12H    insulin lispro  0-4 Units SubCUTAneous TID WC    insulin lispro  0-4 Units SubCUTAneous Nightly    sodium chloride flush  5-40 mL IntraVENous 2 times per day    enoxaparin  40 mg SubCUTAneous Daily    piperacillin-tazobactam  3,375 mg IntraVENous Q8H    vancomycin (VANCOCIN) intermittent dosing (placeholder)   Other RX Placeholder     Continuous Infusions:    dextrose      sodium chloride       PRN Meds: glucose, dextrose bolus **OR** dextrose bolus, glucagon (rDNA), dextrose, sodium chloride flush, sodium chloride, potassium chloride **OR** potassium alternative oral replacement **OR** potassium chloride, magnesium sulfate, ondansetron **OR** ondansetron, polyethylene glycol, acetaminophen **OR** acetaminophen, HYDROcodone 5 mg - acetaminophen **OR** HYDROcodone 5 mg - acetaminophen    Data:     Past Medical History:   has no past medical history on file. Social History:   reports that he has never smoked.  He has never used smokeless tobacco. He reports that he does not use drugs. Family History:   Family History   Problem Relation Age of Onset    No Known Problems Mother     No Known Problems Father        Vitals:  /74   Pulse 81   Temp 98.2 °F (36.8 °C) (Oral)   Resp 16   Ht 6' 2\" (1.88 m)   Wt 222 lb 4.8 oz (100.8 kg)   SpO2 93%   BMI 28.54 kg/m²   Temp (24hrs), Av °F (36.7 °C), Min:97.5 °F (36.4 °C), Max:98.2 °F (36.8 °C)    Recent Labs     22  1558 22  1936 22  0602 22  1127   POCGLU 325* 279* 224* 206*       I/O (24Hr):     Intake/Output Summary (Last 24 hours) at 2022 1208  Last data filed at 2022 1028  Gross per 24 hour   Intake 824.32 ml   Output 1100 ml   Net -275.68 ml       Labs:  Hematology:  Recent Labs     22  0647 22  0658 22  0553   WBC 12.6* 14.0* 10.0   RBC 5.02 4.48 4.49   HGB 15.2 13.6 13.7   HCT 46.6 41.4 41.6   MCV 92.8 92.4 92.7   MCH 30.3 30.4 30.5   MCHC 32.6 32.9 32.9   RDW 12.1 12.0 12.2    215 236   MPV 9.7 9.8 9.8     Chemistry:  Recent Labs     22  0647 22  0658 22  0553   * 134* 137   K 4.0 4.2 3.9   CL 93* 99 101   CO2 29 27 28   GLUCOSE 268* 278* 231*   BUN 19  19   CREATININE 1.03 0.89 1.02   ANIONGAP 10 8* 8*   LABGLOM >60 >60 >60   GFRAA >60 >60 >60   CALCIUM 9.5 8.8 8.4*     Recent Labs     22  0605 22  1155 22  1558 22  1936 22  0602 22  1127   POCGLU 287* 324* 325* 279* 224* 206*     ABG:No results found for: POCPH, PHART, PH, POCPCO2, IME1NNC, PCO2, POCPO2, PO2ART, PO2, POCHCO3, YBP9AIS, HCO3, NBEA, PBEA, BEART, BE, THGBART, THB, XNB4ADS, IDUP6TQT, S0XEWPVK, O2SAT, FIO2  Lab Results   Component Value Date/Time    SPECIAL LEFT AC,12ML 2022 11:35 AM     Lab Results   Component Value Date/Time    CULTURE GRAM NEGATIVE RODS LIGHT GROWTH (A) 2022 02:38 PM    CULTURE NORMAL SKIN [place] 2022 02:38 PM       Radiology:  XR FOOT RIGHT (MIN 3 VIEWS)    Result Date: 8/19/2022  Interval performance of amputation of the great toe at the level of the metatarsal bone with tissue swelling and probable packing material long the amputation site. XR FOOT RIGHT (MIN 3 VIEWS)    Result Date: 8/18/2022  Mildly heterogeneous attenuation of the 1st distal phalanx and osteomyelitis is a consideration. Diffuse soft tissue swelling with soft tissue laceration or defect of the 1st digit. MRI FOOT RIGHT W WO CONTRAST    Result Date: 8/18/2022  1. Soft tissue ulceration at the great toe with underlying complex rim enhancing collection measuring 2.3 x 2.8 x 3.6 cm most compatible with abscess. 2. Osteomyelitis of the proximal and distal phalanges of the right great toe. 3. Diffuse soft tissue edema with associated soft tissue enhancement consistent with cellulitis. Edema is most pronounced at the soft tissues of the great toe. Physical Examination:        General appearance:  alert, cooperative and no distress  Mental Status:  oriented to person, place and time and normal affect  Lungs:  clear to auscultation bilaterally, normal effort  Heart:  regular rate and rhythm, no murmur  Abdomen:  soft, nontender, nondistended, normal bowel sounds, no masses, hepatomegaly, splenomegaly  Extremities:  no edema, redness, tenderness in the calves  Skin:  no gross lesions, rashes, induration    Assessment:        Hospital Problems             Last Modified POA    * (Principal) Acute osteomyelitis of toe of right foot ([address] 8/20/2022 Yes    Hyperglycemia 8/18/2022 Yes    Cellulitis 8/19/2022 Yes    Type 2 diabetes mellitus with diabetic foot infection ([address] 8/20/2022 Yes    Diabetic foot infection ([address] 8/20/2022 Yes       Plan:        Continue local wound care  Antibiotics as ordered  GI DVT prophylaxis  Insulin as ordered, adjust as needed.   Will need outpatient eye exam and further work-up for new diagnosis of diabetes  Discussed with family at bedside  PT and OT as needed    Mylene Metzger DO [place]  8/21/2022  12:08 PM

## 2022-08-21 NOTE — PLAN OF CARE
Problem: Discharge Planning  Goal: Discharge to home or other facility with appropriate resources  8/21/2022 1017 by Guy Wang RN  Outcome: Progressing  Flowsheets (Taken 8/21/2022 3026)  Discharge to home or other facility with appropriate resources: Identify barriers to discharge with patient and caregiver  8/21/2022 0204 by Narendra Hugigns RN  Outcome: Progressing     Problem: Pain  Goal: Verbalizes/displays adequate comfort level or baseline comfort level  8/21/2022 1017 by Guy Wang RN  Outcome: Progressing  8/21/2022 0204 by Narendra Huggins RN  Outcome: Progressing     Problem: Safety - Adult  Goal: Free from fall injury  8/21/2022 1017 by Guy Wang RN  Outcome: Progressing  8/21/2022 0204 by Narendra Huggins RN  Outcome: Progressing  Flowsheets (Taken 8/20/2022 2010)  Free From Fall Injury: Instruct family/caregiver on patient safety     Problem: ABCDS Injury Assessment  Goal: Absence of physical injury  8/21/2022 1017 by Guy Wang RN  Outcome: Progressing  8/21/2022 0204 by Narendra Huggins RN  Outcome: Progressing  Flowsheets (Taken 8/20/2022 2010)  Absence of Physical Injury: Implement safety measures based on patient assessment     Problem: Musculoskeletal - Adult  Goal: Return mobility to safest level of function  8/21/2022 1017 by Guy Wang RN  Outcome: Progressing  Flowsheets (Taken 8/21/2022 6072)  Return Mobility to Safest Level of Function: Assess patient stability and activity tolerance for standing, transferring and ambulating with or without assistive devices  8/21/2022 0204 by Narendra Huggins RN  Outcome: Progressing  Goal: Return ADL status to a safe level of function  8/21/2022 1017 by Guy Wang RN  Outcome: Progressing  Flowsheets (Taken 8/21/2022 6381)  Return ADL Status to a Safe Level of Function: Administer medication as ordered  8/21/2022 0204 by Narendra Huggins RN  Outcome: Progressing     Problem: Metabolic/Fluid and Electrolytes - Adult  Goal: Electrolytes maintained within normal limits  8/21/2022 1017 by Carson eSthi RN  Outcome: Progressing  Flowsheets (Taken 8/21/2022 5417)  Electrolytes maintained within normal limits: Monitor labs and assess patient for signs and symptoms of electrolyte imbalances  8/21/2022 0204 by Lucius Crane RN  Outcome: Progressing  Goal: Hemodynamic stability and optimal renal function maintained  8/21/2022 1017 by Carson Sethi RN  Outcome: Progressing  Flowsheets (Taken 8/21/2022 6050)  Hemodynamic stability and optimal renal function maintained: Monitor labs and assess for signs and symptoms of volume excess or deficit  8/21/2022 0204 by Lucius Crane RN  Outcome: Progressing  Goal: Glucose maintained within prescribed range  8/21/2022 1017 by Carson Sethi RN  Outcome: Progressing  Flowsheets (Taken 8/21/2022 5327)  Glucose maintained within prescribed range: Monitor blood glucose as ordered  8/21/2022 0204 by Lucius Crane RN  Outcome: Progressing     Problem: Skin/Tissue Integrity - Adult  Goal: Incisions, wounds, or drain sites healing without S/S of infection  8/21/2022 1017 by Carson Sethi RN  Outcome: Progressing  Flowsheets (Taken 8/21/2022 0370)  Incisions, Wounds, or Drain Sites Healing Without Sign and Symptoms of Infection: ADMISSION and DAILY: Assess and document risk factors for pressure ulcer development  8/21/2022 0204 by Lucius Crane RN  Outcome: Progressing  Flowsheets (Taken 8/20/2022 2010)  Incisions, Wounds, or Drain Sites Healing Without Sign and Symptoms of Infection:   TWICE DAILY: Assess and document dressing/incision, wound bed, drain sites and surrounding tissue   TWICE DAILY: Assess and document skin integrity   Initiate pressure ulcer prevention bundle as indicated     Problem: Neurosensory - Adult  Goal: Achieves stable or improved neurological status  Outcome: Progressing  Flowsheets (Taken 8/21/2022 0832)  Achieves stable or improved neurological status: Assess for and report changes in neurological status Problem: Respiratory - Adult  Goal: Achieves optimal ventilation and oxygenation  Outcome: Progressing  Flowsheets (Taken 8/21/2022 0832)  Achieves optimal ventilation and oxygenation: Assess for changes in respiratory status     Problem: Chronic Conditions and Co-morbidities  Goal: Patient's chronic conditions and co-morbidity symptoms are monitored and maintained or improved  8/21/2022 1017 by Gena Londono RN  Outcome: Progressing  [facility] (Taken 8/21/2022 9621)  Care Plan - Patient's Chronic Conditions and Co-Morbidity Symptoms are Monitored and Maintained or Improved: Monitor and assess patient's chronic conditions and comorbid symptoms for stability, deterioration, or improvement  8/21/2022 0204 by Tommy Cardoso RN  Outcome: Progressing     Problem: Skin/Tissue Integrity  Goal: Absence of new skin breakdown  Description: 1. Monitor for areas of redness and/or skin breakdown  2. Assess vascular access sites hourly  3. Every 4-6 hours minimum:  Change oxygen saturation probe site  4. Every 4-6 hours:  If on nasal continuous positive airway pressure, respiratory therapy assess nares and determine need for appliance change or resting period.   8/21/2022 1017 by Gena Londono RN  Outcome: Progressing  8/21/2022 0204 by Tommy Cardoso RN  Outcome: Progressing

## 2022-08-21 NOTE — PROGRESS NOTES
[address] [facility] Pharmacokinetic Monitoring Service - Vancomycin    Consulting Provider: Dr. Lori Alvarez   Indication: osteomyelitis of right big toe  Target Concentration: Goal AUC/DANIELLE 400-600 mg*hr/L  Day of Therapy: 4  Additional Antimicrobials: Zosyn    Pertinent Laboratory Values: Wt Readings from Last 1 Encounters:   08/20/22 222 lb 4.8 oz (100.8 kg)     Temp Readings from Last 1 Encounters:   08/21/22 97.9 °F (36.6 °C) (Oral)     Estimated Creatinine Clearance: 90 mL/min (based on SCr of 1.02 mg/dL). Recent Labs     08/20/22  0658 08/21/22  0553   CREATININE 0.89 1.02   WBC 14.0* 10.0     Procalcitonin: none    Pertinent Cultures:  Culture Date Source Results   8/19 Right toe Mod Gram +   Light gram - rods   MRSA Nasal Swab: N/A. Non-respiratory infection. Recent vancomycin administrations                     vancomycin 1000 mg IVPB in 250 mL D5W addavial (mg) 1,000 mg New Bag 08/21/22 0215     1,000 mg New Bag 08/20/22 1515     1,000 mg New Bag  0046     1,000 mg Bolus 08/19/22 1423     1,000 mg New Bag  1348    vancomycin 1000 mg IVPB in 250 mL D5W addavial (mg) 1,000 mg New Bag 08/19/22 0132    vancomycin (VANCOCIN) 2,000 mg in dextrose 5 % 500 mL IVPB (mg) 2,000 mg New Bag 08/18/22 1340                      Plan:  Current dosing regimen is therapeutic  Continue current dose of 1000mg IV q 12 hours  Repeat vancomycin concentration ordered for 8/26.    Pharmacy will continue to monitor patient and adjust therapy as indicated    Thank you for the consult,  Hipolito Arnett, [facility]  8/21/2022 7:40 AM

## 2022-08-21 NOTE — PROGRESS NOTES
Progress Note  Podiatric Medicine and Surgery     Subjective     CC: Right foot infection    Patient seen and examined at bedside. No acute events overnight. Afebrile, vital signs stable   S/p amputation of the right hallux 8/19/2022    HPI :  Georgian Opitz is a 78 y.o. male seen at [facility] [address] for a wound on the right foot. The patient reports that he does not follow with any doctors, does not take any medications other than vitamins, and does not have any significant past medical history, including diabetes. His wife is present at bedside and they report the wound started approximately 5 weeks ago, unknown cause, and has since become progressively worse. They have been soaking the foot in apple cider vinegar but not applying anything else to the wound. Patient states he doesn't smoke or drink alcohol. He has no complaints regarding the left foot. ROS: Denies N/V/F/C/SOB/CP. Otherwise negative except at stated in the HPI.      Medications:  Scheduled Meds:   insulin glargine  20 Units SubCUTAneous Nightly    vancomycin  1,000 mg IntraVENous Q12H    insulin lispro  0-4 Units SubCUTAneous TID WC    insulin lispro  0-4 Units SubCUTAneous Nightly    sodium chloride flush  5-40 mL IntraVENous 2 times per day    enoxaparin  40 mg SubCUTAneous Daily    piperacillin-tazobactam  3,375 mg IntraVENous Q8H    vancomycin (VANCOCIN) intermittent dosing (placeholder)   Other RX Placeholder       Continuous Infusions:   dextrose      sodium chloride         PRN Meds:glucose, dextrose bolus **OR** dextrose bolus, glucagon (rDNA), dextrose, sodium chloride flush, sodium chloride, potassium chloride **OR** potassium alternative oral replacement **OR** potassium chloride, magnesium sulfate, ondansetron **OR** ondansetron, polyethylene glycol, acetaminophen **OR** acetaminophen, HYDROcodone 5 mg - acetaminophen **OR** HYDROcodone 5 mg - acetaminophen    Objective     Vitals:  Patient Vitals for the past 8 hrs:   BP Temp Temp src Pulse Resp SpO2   22 1520 124/81 98.1 °F (36.7 °C) Oral 77 17 93 %   22 1202 123/74 98.2 °F (36.8 °C) Oral 81 16 93 %       Average, Min, and Max for last 24 hours Vitals:  TEMPERATURE:  Temp  Av °F (36.7 °C)  Min: 97.5 °F (36.4 °C)  Max: 98.2 °F (36.8 °C)    RESPIRATIONS RANGE: Resp  Av.8  Min: 16  Max: 17    PULSE RANGE: Pulse  Av.2  Min: 77  Max: 85    BLOOD PRESSURE RANGE:  Systolic (10JNQ), STS:084 , Min:117 , JMZ:668   ; Diastolic (02ZID), JPM:09, Min:74, Max:82      PULSE OXIMETRY RANGE: SpO2  Av.4 %  Min: 91 %  Max: 93 %    I/O last 3 completed shifts: In: .1 [P.O.:800; I.V.:61; IV Piggyback:1152.1]  Out: 4151 [YXIBU:]    CBC:  Recent Labs     22  0647 22  0658 22  0553   WBC 12.6* 14.0* 10.0   HGB 15.2 13.6 13.7   HCT 46.6 41.4 41.6    215 236          BMP:  Recent Labs     22  0647 22  0658 22  0553   * 134* 137   K 4.0 4.2 3.9   CL 93* 99 101   CO2 29 27 28   BUN 19 23 19   CREATININE 1.03 0.89 1.02   GLUCOSE 268* 278* 231*   CALCIUM 9.5 8.8 8.4*          Coags:  No results for input(s): APTT, PROT, INR in the last 72 hours. Lab Results   Component Value Date    SEDRATE 81 (H) 2022     No results for input(s): CRP in the last 72 hours. Lower Extremity Physical Exam:   Vascular: DP and PT pulses are Palpable . CFT <3 seconds to all digits. Hair growth is absent to the level of the digits. Moderate non-pitting edema to the right lower extremity. Neuro: Saph/sural/SP/DP/plantar sensation diminished to light touch. Musculoskeletal: Muscle strength is adequate ROM, adequate strength to all lower extremity muscle groups. Gross deformity is absent. Compartments compressible. Dermatologic: Iatrogenic wound noted to the amputation site of the hallux which was left open for drainage. No purulence noted today, skin edges appear healthy without necrotic tissue.   Erythema significantly improved since yesterday. Clinical Images:        Imaging:   XR FOOT RIGHT (MIN 3 VIEWS)   Final Result   Interval performance of amputation of the great toe at the level of the   metatarsal bone with tissue swelling and probable packing material long the   amputation site. MRI FOOT RIGHT W WO CONTRAST   Final Result   1. Soft tissue ulceration at the great toe with underlying complex rim   enhancing collection measuring 2.3 x 2.8 x 3.6 cm most compatible with   abscess. 2. Osteomyelitis of the proximal and distal phalanges of the right great toe. 3. Diffuse soft tissue edema with associated soft tissue enhancement   consistent with cellulitis. Edema is most pronounced at the soft tissues of   the great toe. XR FOOT RIGHT (MIN 3 VIEWS)   Final Result   Mildly heterogeneous attenuation of the 1st distal phalanx and osteomyelitis   is a consideration. Diffuse soft tissue swelling with soft tissue laceration or defect of the 1st   digit. Cultures:   Culture, Tissue [[id number]] (Abnormal) Collected: 08/19/22 5294   Order Status: Completed Specimen: Tissue from Foot Updated: 08/21/22 1136    Specimen Description . TOE RT GREAT    Direct Exam RARE NEUTROPHILS Abnormal      MODERATE GRAM POSITIVE COCCI IN CLUSTERS Abnormal     Culture GRAM NEGATIVE RODS LIGHT GROWTH Abnormal      NORMAL SKIN [place]       Assessment   Twan Gonzalez is a 78 y.o. 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 *       Plan     Patient examined and evaluated at bedside.   Cultures obtained: pending  Continue medical management per Internal Medicine. Abx: Per infectious disease  Dressing to right foot changed. 1/4\" iodoform packing, DSD. Plan for daily dressing changes over the next couple days, will plan for delayed primary closure early next week PWB to Right lower extremity in surgical shoe. Patient care discussed with  Dr. Escalera.     Matthieu Waddell DPM   Podiatric Medicine & Surgery   8/21/2022 at 4:45 PM

## 2022-08-22 PROBLEM — E11.628 DIABETIC INFECTION OF RIGHT FOOT (HCC): Status: ACTIVE | Noted: 2022-08-22

## 2022-08-22 PROBLEM — M86.9 OSTEOMYELITIS OF GREAT TOE OF RIGHT FOOT (HCC): Status: ACTIVE | Noted: 2022-08-22

## 2022-08-22 PROBLEM — L08.9 DIABETIC INFECTION OF RIGHT FOOT (HCC): Status: ACTIVE | Noted: 2022-08-22

## 2022-08-22 LAB
CULTURE: ABNORMAL
DIRECT EXAM: ABNORMAL
DIRECT EXAM: ABNORMAL
GLUCOSE BLD-MCNC: 247 MG/DL (ref 75–110)
GLUCOSE BLD-MCNC: 250 MG/DL (ref 75–110)
GLUCOSE BLD-MCNC: 257 MG/DL (ref 75–110)
GLUCOSE BLD-MCNC: 271 MG/DL (ref 75–110)
SPECIMEN DESCRIPTION: ABNORMAL

## 2022-08-22 PROCEDURE — 97535 SELF CARE MNGMENT TRAINING: CPT

## 2022-08-22 PROCEDURE — 6360000002 HC RX W HCPCS

## 2022-08-22 PROCEDURE — 6370000000 HC RX 637 (ALT 250 FOR IP): Performed by: INTERNAL MEDICINE

## 2022-08-22 PROCEDURE — 1200000000 HC SEMI PRIVATE

## 2022-08-22 PROCEDURE — 99232 SBSQ HOSP IP/OBS MODERATE 35: CPT | Performed by: INTERNAL MEDICINE

## 2022-08-22 PROCEDURE — 2580000003 HC RX 258

## 2022-08-22 PROCEDURE — 6370000000 HC RX 637 (ALT 250 FOR IP)

## 2022-08-22 PROCEDURE — 2580000003 HC RX 258: Performed by: INTERNAL MEDICINE

## 2022-08-22 PROCEDURE — 97166 OT EVAL MOD COMPLEX 45 MIN: CPT

## 2022-08-22 PROCEDURE — 6360000002 HC RX W HCPCS: Performed by: INTERNAL MEDICINE

## 2022-08-22 PROCEDURE — 82947 ASSAY GLUCOSE BLOOD QUANT: CPT

## 2022-08-22 PROCEDURE — 97116 GAIT TRAINING THERAPY: CPT

## 2022-08-22 RX ORDER — METRONIDAZOLE 500 MG/1
500 TABLET ORAL EVERY 8 HOURS SCHEDULED
Status: DISCONTINUED | OUTPATIENT
Start: 2022-08-22 | End: 2022-08-24 | Stop reason: HOSPADM

## 2022-08-22 RX ORDER — INSULIN GLARGINE 100 [IU]/ML
22 INJECTION, SOLUTION SUBCUTANEOUS NIGHTLY
Status: DISCONTINUED | OUTPATIENT
Start: 2022-08-22 | End: 2022-08-23

## 2022-08-22 RX ORDER — INSULIN LISPRO 100 [IU]/ML
0-8 INJECTION, SOLUTION INTRAVENOUS; SUBCUTANEOUS
Status: DISCONTINUED | OUTPATIENT
Start: 2022-08-22 | End: 2022-08-24 | Stop reason: HOSPADM

## 2022-08-22 RX ORDER — INSULIN LISPRO 100 [IU]/ML
0-4 INJECTION, SOLUTION INTRAVENOUS; SUBCUTANEOUS NIGHTLY
Status: DISCONTINUED | OUTPATIENT
Start: 2022-08-22 | End: 2022-08-24 | Stop reason: HOSPADM

## 2022-08-22 RX ADMIN — CEFTRIAXONE SODIUM 2000 MG: 2 INJECTION, POWDER, FOR SOLUTION INTRAMUSCULAR; INTRAVENOUS at 18:55

## 2022-08-22 RX ADMIN — PIPERACILLIN AND TAZOBACTAM 3375 MG: 3; .375 INJECTION, POWDER, FOR SOLUTION INTRAVENOUS at 14:20

## 2022-08-22 RX ADMIN — INSULIN LISPRO 2 UNITS: 100 INJECTION, SOLUTION INTRAVENOUS; SUBCUTANEOUS at 14:18

## 2022-08-22 RX ADMIN — PIPERACILLIN AND TAZOBACTAM 3375 MG: 3; .375 INJECTION, POWDER, FOR SOLUTION INTRAVENOUS at 05:51

## 2022-08-22 RX ADMIN — SODIUM CHLORIDE 25 ML: 9 INJECTION, SOLUTION INTRAVENOUS at 18:53

## 2022-08-22 RX ADMIN — INSULIN GLARGINE 22 UNITS: 100 INJECTION, SOLUTION SUBCUTANEOUS at 20:42

## 2022-08-22 RX ADMIN — SODIUM CHLORIDE: 9 INJECTION, SOLUTION INTRAVENOUS at 05:48

## 2022-08-22 RX ADMIN — SODIUM CHLORIDE 25 ML: 9 INJECTION, SOLUTION INTRAVENOUS at 14:33

## 2022-08-22 RX ADMIN — INSULIN LISPRO 2 UNITS: 100 INJECTION, SOLUTION INTRAVENOUS; SUBCUTANEOUS at 08:48

## 2022-08-22 RX ADMIN — SODIUM CHLORIDE 25 ML: 9 INJECTION, SOLUTION INTRAVENOUS at 14:19

## 2022-08-22 RX ADMIN — METRONIDAZOLE 500 MG: 500 TABLET ORAL at 20:43

## 2022-08-22 RX ADMIN — ENOXAPARIN SODIUM 40 MG: 100 INJECTION SUBCUTANEOUS at 08:47

## 2022-08-22 RX ADMIN — VANCOMYCIN HYDROCHLORIDE 1000 MG: 1 INJECTION, POWDER, LYOPHILIZED, FOR SOLUTION INTRAVENOUS at 02:41

## 2022-08-22 RX ADMIN — INSULIN LISPRO 4 UNITS: 100 INJECTION, SOLUTION INTRAVENOUS; SUBCUTANEOUS at 17:28

## 2022-08-22 RX ADMIN — VANCOMYCIN HYDROCHLORIDE 1000 MG: 1 INJECTION, POWDER, LYOPHILIZED, FOR SOLUTION INTRAVENOUS at 14:34

## 2022-08-22 ASSESSMENT — ENCOUNTER SYMPTOMS
RESPIRATORY NEGATIVE: 1
GASTROINTESTINAL NEGATIVE: 1

## 2022-08-22 NOTE — PLAN OF CARE
Problem: Discharge Planning  Goal: Discharge to home or other facility with appropriate resources  Outcome: Progressing  Flowsheets (Taken 8/22/2022 0909)  Discharge to home or other facility with appropriate resources:   Identify barriers to discharge with patient and caregiver   Arrange for needed discharge resources and transportation as appropriate   Identify discharge learning needs (meds, wound care, etc)   Refer to discharge planning if patient needs post-hospital services based on physician order or complex needs related to functional status, cognitive ability or social support system     Problem: Pain  Goal: Verbalizes/displays adequate comfort level or baseline comfort level  Outcome: Progressing  Flowsheets (Taken 8/22/2022 1933)  Verbalizes/displays adequate comfort level or baseline comfort level:   Encourage patient to monitor pain and request assistance   Assess pain using appropriate pain scale   Administer analgesics based on type and severity of pain and evaluate response   Implement non-pharmacological measures as appropriate and evaluate response   Consider cultural and social influences on pain and pain management   Notify Licensed Independent Practitioner if interventions unsuccessful or patient reports new pain  Note: Pain level assessment complete.    Patient educated on pain scale and control interventions  PRN pain medication given per patient request  Patient instructed to call out with new onset of pain or unrelieved pain  Patient denies any pain at this time     Problem: Safety - Adult  Goal: Free from fall injury  Outcome: Progressing  Flowsheets (Taken 8/22/2022 1229)  Free From Fall Injury: Instruct family/caregiver on patient safety     Problem: ABCDS Injury Assessment  Goal: Absence of physical injury  Outcome: Progressing  Flowsheets (Taken 8/22/2022 1229)  Absence of Physical Injury: Implement safety measures based on patient assessment     Problem: Musculoskeletal - Adult  Goal: Return mobility to safest level of function  Outcome: Progressing  Flowsheets (Taken 8/22/2022 0909)  Return Mobility to Safest Level of Function:   Assess patient stability and activity tolerance for standing, transferring and ambulating with or without assistive devices   Assist with transfers and ambulation using safe patient handling equipment as needed   Ensure adequate protection for wounds/incisions during mobilization   Obtain physical therapy/occupational therapy consults as needed  Goal: Return ADL status to a safe level of function  Outcome: Progressing  Flowsheets (Taken 8/22/2022 0909)  Return ADL Status to a Safe Level of Function:   Administer medication as ordered   Assess activities of daily living deficits and provide assistive devices as needed   Obtain physical therapy/occupational therapy consults as needed     Problem: Metabolic/Fluid and Electrolytes - Adult  Goal: Electrolytes maintained within normal limits  Outcome: Progressing  Flowsheets (Taken 8/22/2022 0909)  Electrolytes maintained within normal limits: Monitor labs and assess patient for signs and symptoms of electrolyte imbalances  Goal: Hemodynamic stability and optimal renal function maintained  Outcome: Progressing  Flowsheets (Taken 8/22/2022 0909)  Hemodynamic stability and optimal renal function maintained:   Monitor labs and assess for signs and symptoms of volume excess or deficit   Monitor intake, output and patient weight  Goal: Glucose maintained within prescribed range  Outcome: Progressing  Flowsheets (Taken 8/22/2022 0909)  Glucose maintained within prescribed range:   Monitor blood glucose as ordered   Assess for signs and symptoms of hyperglycemia and hypoglycemia   Administer ordered medications to maintain glucose within target range     Problem: Skin/Tissue Integrity - Adult  Goal: Incisions, wounds, or drain sites healing without S/S of infection  Outcome: Progressing  Flowsheets (Taken 8/22/2022 1231)  Incisions, Wounds, or Drain Sites Healing Without Sign and Symptoms of Infection:   TWICE DAILY: Assess and document skin integrity   TWICE DAILY: Assess and document dressing/incision, wound bed, drain sites and surrounding tissue     Problem: Chronic Conditions and Co-morbidities  Goal: Patient's chronic conditions and co-morbidity symptoms are monitored and maintained or improved  Outcome: Progressing  Flowsheets (Taken 8/22/2022 0909)  Care Plan - Patient's Chronic Conditions and Co-Morbidity Symptoms are Monitored and Maintained or Improved:   Monitor and assess patient's chronic conditions and comorbid symptoms for stability, deterioration, or improvement   Collaborate with multidisciplinary team to address chronic and comorbid conditions and prevent exacerbation or deterioration   Update acute care plan with appropriate goals if chronic or comorbid symptoms are exacerbated and prevent overall improvement and discharge     Problem: Skin/Tissue Integrity  Goal: Absence of new skin breakdown  Description: 1. Monitor for areas of redness and/or skin breakdown  2.   Assess vascular access sites hourly  Outcome: Progressing     Problem: Neurosensory - Adult  Goal: Achieves stable or improved neurological status  Outcome: Progressing  Flowsheets (Taken 8/22/2022 0909)  Achieves stable or improved neurological status: Assess for and report changes in neurological status     Problem: Respiratory - Adult  Goal: Achieves optimal ventilation and oxygenation  Outcome: Progressing  Flowsheets (Taken 8/22/2022 0909)  Achieves optimal ventilation and oxygenation:   Assess for changes in respiratory status   Assess for changes in mentation and behavior   Position to facilitate oxygenation and minimize respiratory effort   Encourage broncho-pulmonary hygiene including cough, deep breathe, incentive spirometry   Assess the need for suctioning and aspirate as needed   Assess and instruct to report shortness of breath or any respiratory difficulty

## 2022-08-22 NOTE — PROGRESS NOTES
Physician Progress Note      PATIENT:               Alonso Champion  CSN #:                  [id number]  :                       1955  ADMIT DATE:       2022 10:54 AM  [address] [place] DATE:  RESPONDING  PROVIDER #:        Alberto Dye          QUERY TEXT:    Patient admitted with osteomyelitis. Per podiatry progress note, noted to also   have diabetic right hallux foot ulcer. If possible, please document in   progress notes and discharge summary the severity/depth of the ulcer: The medical record reflects the following:  Risk Factors: DM, Osteomyelitis  Clinical Indicators: Per podiatry note \"Diabetic foot ulcer to level of   tendon, right hallux\" Open wound to right medial hallux measuring   approximately 2 x 2 x 0.4 cm and probes to tendon  Treatment: Debridement and OR for amp    Thank you,  Semaj Harris RN  Options provided:  -- Muscle involvement without necrosis  -- Muscle involvement with necrosis  -- Bone involvement without necrosis  -- Bone involvement with necrosis  -- Other - I will add my own diagnosis  -- Disagree - Not applicable / Not valid  -- Disagree - Clinically unable to determine / Unknown  -- Refer to Clinical Documentation Reviewer    PROVIDER RESPONSE TEXT:    This patient has a diabetic right hallux foot ulcer with bone involvement with   necrosis. Query created by:  Shubham Hernandez on 2022 12:54 PM      Electronically signed by:  Alberto Dye 2022 2:58 PM

## 2022-08-22 NOTE — PROGRESS NOTES
Progress Note  Podiatric Medicine and Surgery     Subjective     CC: Right foot infection    Patient seen and examined at bedside. No acute events overnight. Afebrile, vital signs stable   S/p amputation of the right hallux 8/19/2022    HPI :  Salena Vicente is a 78 y.o. male seen at [address] [place], [place] [address],[address] for a wound on the right foot. The patient reports that he does not follow with any doctors, does not take any medications other than vitamins, and does not have any significant past medical history, including diabetes. His wife is present at bedside and they report the wound started approximately 5 weeks ago, unknown cause, and has since become progressively worse. They have been soaking the foot in apple cider vinegar but not applying anything else to the wound. Patient states he doesn't smoke or drink alcohol. He has no complaints regarding the left foot. ROS: Denies N/V/F/C/SOB/CP. Otherwise negative except at stated in the HPI.      Medications:  Scheduled Meds:   insulin glargine  20 Units SubCUTAneous Nightly    vancomycin  1,000 mg IntraVENous Q12H    insulin lispro  0-4 Units SubCUTAneous TID WC    insulin lispro  0-4 Units SubCUTAneous Nightly    sodium chloride flush  5-40 mL IntraVENous 2 times per day    enoxaparin  40 mg SubCUTAneous Daily    piperacillin-tazobactam  3,375 mg IntraVENous Q8H    vancomycin (VANCOCIN) intermittent dosing (placeholder)   Other RX Placeholder       Continuous Infusions:   dextrose      sodium chloride 12.5 mL/hr at 08/22/22 0548       PRN Meds:glucose, dextrose bolus **OR** dextrose bolus, glucagon (rDNA), dextrose, sodium chloride flush, sodium chloride, potassium chloride **OR** potassium alternative oral replacement **OR** potassium chloride, magnesium sulfate, ondansetron **OR** ondansetron, polyethylene glycol, acetaminophen **OR** acetaminophen, HYDROcodone 5 mg - acetaminophen **OR** HYDROcodone 5 mg - acetaminophen    Objective     Vitals:  Patient Vitals for the past 8 hrs:   BP Temp Temp src Pulse Resp SpO2 Weight   22 0727 137/83 97.9 °F (36.6 °C) Oral 77 16 97 % --   22 0453 -- -- -- -- -- -- 220 lb (99.8 kg)       Average, Min, and Max for last 24 hours Vitals:  TEMPERATURE:  Temp  Av.2 °F (36.8 °C)  Min: 97.9 °F (36.6 °C)  Max: 98.4 °F (36.9 °C)    RESPIRATIONS RANGE: Resp  Av.5  Min: 16  Max: 17    PULSE RANGE: Pulse  Av.5  Min: 77  Max: 83    BLOOD PRESSURE RANGE:  Systolic (71XGY), QRM:352 , Min:123 , QBW:271   ; Diastolic (91YEV), PDA:53, Min:74, Max:84      PULSE OXIMETRY RANGE: SpO2  Av.8 %  Min: 93 %  Max: 97 %    I/O last 3 completed shifts: In: 897.9 [P.O.:400; IV Piggyback:497.9]  Out: 2400 [Urine:2400]    CBC:  Recent Labs     22  0658 22  0553   WBC 14.0* 10.0   HGB 13.6 13.7   HCT 41.4 41.6    236          BMP:  Recent Labs     22  0658 22  0553   * 137   K 4.2 3.9   CL 99 101   CO2 27 28   BUN 23 19   CREATININE 0.89 1.02   GLUCOSE 278* 231*   CALCIUM 8.8 8.4*          Coags:  No results for input(s): APTT, PROT, INR in the last 72 hours. Lab Results   Component Value Date    SEDRATE 81 (H) 2022     No results for input(s): CRP in the last 72 hours. Lower Extremity Physical Exam:   Vascular: DP and PT pulses are Palpable . CFT <3 seconds to all digits. Hair growth is absent to the level of the digits. Moderate non-pitting edema to the right lower extremity. Neuro: Saph/sural/SP/DP/plantar sensation diminished to light touch. Musculoskeletal: Muscle strength is adequate ROM, adequate strength to all lower extremity muscle groups. Gross deformity is absent. Compartments compressible. Dermatologic: Iatrogenic wound noted to the amputation site of the hallux which was left open for drainage. No purulence noted today, skin edges appear healthy without necrotic tissue. Erythema significantly improved since yesterday.     Clinical Images:        Imaging:   XR FOOT RIGHT (MIN 3 VIEWS)   Final Result   Interval performance of amputation of the great toe at the level of the   metatarsal bone with tissue swelling and probable packing material long the   amputation site. MRI FOOT RIGHT W WO CONTRAST   Final Result   1. Soft tissue ulceration at the great toe with underlying complex rim   enhancing collection measuring 2.3 x 2.8 x 3.6 cm most compatible with   abscess. 2. Osteomyelitis of the proximal and distal phalanges of the right great toe. 3. Diffuse soft tissue edema with associated soft tissue enhancement   consistent with cellulitis. Edema is most pronounced at the soft tissues of   the great toe. XR FOOT RIGHT (MIN 3 VIEWS)   Final Result   Mildly heterogeneous attenuation of the 1st distal phalanx and osteomyelitis   is a consideration. Diffuse soft tissue swelling with soft tissue laceration or defect of the 1st   digit. Cultures:   Culture, Tissue [[id number]] (Abnormal) Collected: 08/19/22 1437   Order Status: Completed Specimen: Tissue from Foot Updated: 08/21/22 1131    Specimen Description . TOE RT GREAT    Direct Exam RARE NEUTROPHILS Abnormal      MODERATE GRAM POSITIVE COCCI IN CLUSTERS Abnormal     Culture GRAM NEGATIVE RODS LIGHT GROWTH Abnormal      NORMAL SKIN [place]       Assessment   Salena Vicente is a 78 y.o. 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 *       Plan     Patient examined and evaluated at bedside. Continue medical management per Internal Medicine.   Abx: Per infectious disease  Dressing to right foot changed. 1/4\" iodoform packing, DSD. Plan for daily dressing changes over the next couple days, will plan for possible delayed primary closure sometime this week. WBAT to heel of operative foot. Patient care discussed with  Dr. Martina Leonard.     Mary Courtney DPM   Podiatric Medicine & Surgery   8/22/2022 at 8:46 AM

## 2022-08-22 NOTE — PROGRESS NOTES
today.  I was asked to evaluate and help with antibiotic choice. Current evaluation:2022    /81   Pulse 83   Temp 98.1 °F (36.7 °C) (Oral)   Resp 16   Ht 6' 2\" (1.88 m)   Wt 220 lb (99.8 kg)   SpO2 93%   BMI 28.25 kg/m²     Temperature Range: Temp: 98.1 °F (36.7 °C) Temp  Av °F (36.7 °C)  Min: 97.5 °F (36.4 °C)  Max: 98.4 °F (36.9 °C)  The patient underwent right hallux amputation on 2022 and moderate amount of purulent drainage was expressed from the surgical site and the toe was disarticulated at the MPJ and all purulent areas expressed from the area and tendons were identified and resected and the site was left open to drain. And is sitting in the chair he is awake and alert in no acute distress. No subjective fever or chills. No abdominal pain nausea vomiting or diarrhea. Review of Systems   Constitutional: Negative. HENT: Negative. Respiratory: Negative. Cardiovascular: Negative. Gastrointestinal: Negative. Genitourinary: Negative. Musculoskeletal: Negative. Neurological: Negative. Psychiatric/Behavioral: Negative. Physical Examination :     Physical Exam  Constitutional:       Appearance: He is well-developed. HENT:      Head: Normocephalic and atraumatic. Cardiovascular:      Rate and Rhythm: Normal rate. Heart sounds: Normal heart sounds. No friction rub. No gallop. Pulmonary:      Effort: Pulmonary effort is normal.      Breath sounds: Normal breath sounds. No wheezing. Abdominal:      General: Bowel sounds are normal.      Palpations: Abdomen is soft. There is no mass. Tenderness: There is no abdominal tenderness. Musculoskeletal:         General: Normal range of motion. Cervical back: Normal range of motion and neck supple. Lymphadenopathy:      Cervical: No cervical adenopathy. Skin:     General: Skin is warm and dry. Comments:  There is a dressing in the right foot which was not removed Neurological:      Mental Status: He is alert and oriented to person, place, and time. Laboratory data:   I have independently reviewed the followinglabs:  CBC with Differential:   Recent Labs     08/20/22  0658 08/21/22  0553   WBC 14.0* 10.0   HGB 13.6 13.7   HCT 41.4 41.6    236   LYMPHOPCT 11* 20*   MONOPCT 8 9     BMP:   Recent Labs     08/20/22  0658 08/21/22  0553   * 137   K 4.2 3.9   CL 99 101   CO2 27 28   BUN 23 19   CREATININE 0.89 1.02     Hepatic Function Panel: No results for input(s): PROT, LABALBU, BILIDIR, IBILI, BILITOT, ALKPHOS, ALT, AST in the last 72 hours. No results found for: PROCAL  Lab Results   Component Value Date/Time    .6 08/18/2022 11:35 AM     Lab Results   Component Value Date    SEDRATE 81 (H) 08/18/2022         No results found for: DDIMER  No results found for: FERRITIN  No results found for: LDH  No results found for: FIBRINOGEN    No results found for requested labs within last 30 days. No results found for: COVID19    No results for input(s): VANCOTROUGH in the last 72 hours. Imaging Studies:   THREE XRAY VIEWS OF THE RIGHT FOOT 8/19/2022 4:32 pm  Impression   Interval performance of amputation of the great toe at the level of the   metatarsal bone with tissue swelling and probable packing material long the   amputation site. Cultures:     Culture, Tissue [[id number]] (Abnormal)  Collected: 08/19/22 1438   Order Status: Completed Specimen: Tissue from Foot Updated: 08/22/22 1548    Specimen Description . TOE RT GREAT    Direct Exam RARE NEUTROPHILS Abnormal      MODERATE GRAM POSITIVE COCCI IN CLUSTERS Abnormal     Culture ESCHERICHIA COLI LIGHT GROWTH Abnormal      NORMAL SKIN [place]     BACTEROIDES FRAGILIS MODERATE GROWTH BETA LACTAMASE POSITIVE Abnormal      Culture, Blood 1 [[id number]] Collected: 08/18/22 1135   Order Status: Completed Specimen: Blood Updated: 08/22/22 1400    Specimen Description . BLOOD    Special Requests LEFT AC,12ML    Culture NO GROWTH 4 DAYS   Culture, Blood 1 [[id number]] Collected: 08/18/22 1130   Order Status: Completed Specimen: Blood Updated: 08/22/22 1400    Specimen Description . BLOOD    Special Requests RFA,12ML    Culture NO GROWTH 4 DAYS       Medications:      insulin lispro  0-8 Units SubCUTAneous TID WC    insulin lispro  0-4 Units SubCUTAneous Nightly    insulin glargine  22 Units SubCUTAneous Nightly    vancomycin  1,000 mg IntraVENous Q12H    sodium chloride flush  5-40 mL IntraVENous 2 times per day    enoxaparin  40 mg SubCUTAneous Daily    piperacillin-tazobactam  3,375 mg IntraVENous Q8H    vancomycin (VANCOCIN) intermittent dosing (placeholder)   Other RX Placeholder           [facility]  Lakshmi Dickens MD  [employer] messaging  OFFICE: [phone]      Electronically signed by Lakshmi Dickens MD on 8/22/2022 at 5:08 PM  Thank you for allowing us to participate in the care of this patient. Please call with questions. This note iscreated with the assistance of a speech recognition program.  While intending to generate a document that actually reflects the content of the visit, the document can still have some errors including those of syntax andsound a like substitutions which may escape proof reading. In such instances, actual meaning can be extrapolated by contextual diversion.

## 2022-08-22 NOTE — PLAN OF CARE
Problem: Discharge Planning  Goal: Discharge to home or other facility with appropriate resources  Outcome: Progressing  Flowsheets (Taken 8/21/2022 2140)  Discharge to home or other facility with appropriate resources:   Identify barriers to discharge with patient and caregiver   Arrange for needed discharge resources and transportation as appropriate   Identify discharge learning needs (meds, wound care, etc)   Refer to discharge planning if patient needs post-hospital services based on physician order or complex needs related to functional status, cognitive ability or social support system     Problem: Pain  Goal: Verbalizes/displays adequate comfort level or baseline comfort level  Outcome: Progressing     Problem: Safety - Adult  Goal: Free from fall injury  Outcome: Progressing     Problem: Musculoskeletal - Adult  Goal: Return mobility to safest level of function  Outcome: Progressing  Flowsheets (Taken 8/21/2022 2140)  Return Mobility to Safest Level of Function:   Assess patient stability and activity tolerance for standing, transferring and ambulating with or without assistive devices   Assist with transfers and ambulation using safe patient handling equipment as needed  Goal: Return ADL status to a safe level of function  Outcome: Progressing  Flowsheets (Taken 8/21/2022 2140)  Return ADL Status to a Safe Level of Function:   Administer medication as ordered   Assess activities of daily living deficits and provide assistive devices as needed     Problem: ABCDS Injury Assessment  Goal: Absence of physical injury  Outcome: Progressing     Problem: Metabolic/Fluid and Electrolytes - Adult  Goal: Electrolytes maintained within normal limits  Outcome: Progressing  Flowsheets (Taken 8/21/2022 2140)  Electrolytes maintained within normal limits: Monitor labs and assess patient for signs and symptoms of electrolyte imbalances  Goal: Hemodynamic stability and optimal renal function maintained  Outcome: Progressing  Flowsheets (Taken 8/21/2022 2140)  Hemodynamic stability and optimal renal function maintained: Monitor labs and assess for signs and symptoms of volume excess or deficit  Goal: Glucose maintained within prescribed range  Outcome: Progressing  Flowsheets (Taken 8/21/2022 2140)  Glucose maintained within prescribed range: Monitor blood glucose as ordered     Problem: Metabolic/Fluid and Electrolytes - Adult  Goal: Hemodynamic stability and optimal renal function maintained  Outcome: Progressing  Flowsheets (Taken 8/21/2022 2140)  Hemodynamic stability and optimal renal function maintained: Monitor labs and assess for signs and symptoms of volume excess or deficit     Problem: Skin/Tissue Integrity - Adult  Goal: Incisions, wounds, or drain sites healing without S/S of infection  Outcome: Progressing     Problem: Chronic Conditions and Co-morbidities  Goal: Patient's chronic conditions and co-morbidity symptoms are monitored and maintained or improved  Outcome: Progressing  Flowsheets (Taken 8/21/2022 2140)  Care Plan - Patient's Chronic Conditions and Co-Morbidity Symptoms are Monitored and Maintained or Improved: Monitor and assess patient's chronic conditions and comorbid symptoms for stability, deterioration, or improvement     Problem: Skin/Tissue Integrity  Goal: Absence of new skin breakdown  Description: 1. Monitor for areas of redness and/or skin breakdown  2. Assess vascular access sites hourly  3. Every 4-6 hours minimum:  Change oxygen saturation probe site  4. Every 4-6 hours:  If on nasal continuous positive airway pressure, respiratory therapy assess nares and determine need for appliance change or resting period.   Outcome: Progressing

## 2022-08-22 NOTE — PROGRESS NOTES
[facility]  Office: [address], DO, Jessy Gillespiemond, DO, Marilee Yates, DO, Milka Hansen Sophia, DO, Brittny Barnes MD, Lashonda Rojo MD, Devendra Perez MD, Rafi Liu MD,  Catie Garcia MD, Kristal Blank MD, Kalli Murguia, DO, Loly Bai MD,  Kia Paulson MD, Rosa Girard MD, Jayden Dodd, DO, Edmond Magana MD, Nicole Bailey MD, Luz Marina Baron MD, Suhail Schneider DO, Richard Rand MD, Rosy Schirmer, MD, Anna Gu, CNP,  Arianna Agarwal CNP, Kamla Velásquez CNP, Mayra Daigle CNP, Kerline Philip PA-C, Mikaela Lanza [facility], Alanis Augustine, CNP, Mame Carvalho, CNP, Mary Jane Sharp, CNP, Mary Griffin, CNP, Nelly Hartley, CNP, Nasir Salter, CNS, Sisi Chandra, [facility], Quentin Maldonado, CNP, Nori Hatfield, [facility], [facility], [facility]    Progress Note    8/22/2022    11:52 AM    Name:   Neda Moreland  MRN:     [id number]     Acct:      [de-identified]   Room:   2014/2014-02  IP Day:  4  Admit Date:  8/18/2022 10:54 AM    PCP:   No primary care provider on file. Code Status:  Full Code    Subjective:     C/C:   Chief Complaint   Patient presents with    Wound Infection     Right foot infection     Interval History Status: improved. Patient sitting up in chair eating lunch denies any complaints of chest pain, shortness of breath, nausea vomiting, fever chills or other acute complaints. Awaiting delayed wound closure per podiatry and final determination of antibiotics for discharge    Brief History:     Neda Moreland is a 78 y.o. male who presents with redness and wound to the bottom of his right great toe and is admitted to the hospital for the management of Osteomyelitis. About 5 weeks ago he noticed that his toenail was falling off and it was bleeding and he noticed a wound to the bottom of his toe although he denies injury.   He said over the last several weeks its gotten progressively worse and [place] he noticed foul smelling drainage and worsening swelling and redness. Denies any pain. He reports he has not seen a doctor in many years and does not take any medications at home. X-ray of right foot shows concern for osteomyelitis and soft tissue swelling. Podiatry was consulted for further evaluation in the ED. blood sugars were noted to be in the 300s and there is concern for undiagnosed diabetes    Review of Systems:     Constitutional:  negative for chills, fevers, sweats  Respiratory:  negative for cough, dyspnea on exertion, shortness of breath, wheezing  Cardiovascular:  negative for chest pain, chest pressure/discomfort, lower extremity edema, palpitations  Gastrointestinal:  negative for abdominal pain, constipation, diarrhea, nausea, vomiting  Neurological:  negative for dizziness, headache    Medications:      Allergies:  No Known Allergies    Current Meds:   Scheduled Meds:    insulin glargine  20 Units SubCUTAneous Nightly    vancomycin  1,000 mg IntraVENous Q12H    insulin lispro  0-4 Units SubCUTAneous TID WC    insulin lispro  0-4 Units SubCUTAneous Nightly    sodium chloride flush  5-40 mL IntraVENous 2 times per day    enoxaparin  40 mg SubCUTAneous Daily    piperacillin-tazobactam  3,375 mg IntraVENous Q8H    vancomycin (VANCOCIN) intermittent dosing (placeholder)   Other RX Placeholder     Continuous Infusions:    dextrose      sodium chloride 12.5 mL/hr at 08/22/22 0548     PRN Meds: glucose, dextrose bolus **OR** dextrose bolus, glucagon (rDNA), dextrose, sodium chloride flush, sodium chloride, potassium chloride **OR** potassium alternative oral replacement **OR** potassium chloride, magnesium sulfate, ondansetron **OR** ondansetron, polyethylene glycol, acetaminophen **OR** acetaminophen, HYDROcodone 5 mg - acetaminophen **OR** HYDROcodone 5 mg - acetaminophen    Data:     Past Medical History:   has no past medical history on file. Social History:   reports that he has never smoked. He has never used smokeless tobacco. He reports that he does not use drugs. Family History:   Family History   Problem Relation Age of Onset    No Known Problems Mother     No Known Problems Father        Vitals:  BP 98/71   Pulse 95   Temp 97.5 °F (36.4 °C) (Oral)   Resp 16   Ht 6' 2\" (1.88 m)   Wt 220 lb (99.8 kg)   SpO2 96%   BMI 28.25 kg/m²   Temp (24hrs), Av °F (36.7 °C), Min:97.5 °F (36.4 °C), Max:98.4 °F (36.9 °C)    Recent Labs     22  1117   POCGLU 227* 257* 257* 250*       I/O (24Hr):     Intake/Output Summary (Last 24 hours) at 2022 1152  Last data filed at 2022 0919  Gross per 24 hour   Intake 398.96 ml   Output 1550 ml   Net -1151.04 ml       Labs:  Hematology:  Recent Labs     22  0658 22  0553   WBC 14.0* 10.0   RBC 4.48 4.49   HGB 13.6 13.7   HCT 41.4 41.6   MCV 92.4 92.7   MCH 30.4 30.5   MCHC 32.9 32.9   RDW 12.0 12.2    236   MPV 9.8 9.8     Chemistry:  Recent Labs     22  0658 22  0553   * 137   K 4.2 3.9   CL 99 101   CO2 27 28   GLUCOSE 278* 231*   BUN 23 19   CREATININE 0.89 1.02   ANIONGAP 8* 8*   LABGLOM >60 >60   GFRAA >60 >60   CALCIUM 8.8 8.4*     Recent Labs     22  0622  1127 22  1117   POCGLU 224* 206* 227* 257* 257* 250*     ABG:No results found for: POCPH, PHART, PH, POCPCO2, FWH9SPP, PCO2, POCPO2, PO2ART, PO2, POCHCO3, DAD8EHN, HCO3, NBEA, PBEA, BEART, BE, THGBART, THB, BDQ9UQM, OOPM9ACX, Y4OMLOFE, O2SAT, FIO2  Lab Results   Component Value Date/Time    SPECIAL LEFT AC,12ML 2022 11:35 AM     Lab Results   Component Value Date/Time    CULTURE GRAM NEGATIVE RODS LIGHT GROWTH (A) 2022 02:38 PM    CULTURE NORMAL SKIN [place] 2022 02:38 PM       Radiology:  XR FOOT RIGHT (MIN 3 VIEWS)    Result Date: 2022  Interval performance of amputation of the great toe at the level of the metatarsal bone with tissue swelling and probable packing material long the amputation site. XR FOOT RIGHT (MIN 3 VIEWS)    Result Date: 8/18/2022  Mildly heterogeneous attenuation of the 1st distal phalanx and osteomyelitis is a consideration. Diffuse soft tissue swelling with soft tissue laceration or defect of the 1st digit. MRI FOOT RIGHT W WO CONTRAST    Result Date: 8/18/2022  1. Soft tissue ulceration at the great toe with underlying complex rim enhancing collection measuring 2.3 x 2.8 x 3.6 cm most compatible with abscess. 2. Osteomyelitis of the proximal and distal phalanges of the right great toe. 3. Diffuse soft tissue edema with associated soft tissue enhancement consistent with cellulitis. Edema is most pronounced at the soft tissues of the great toe.        Physical Examination:        General appearance:  alert, cooperative and no distress  Mental Status:  oriented to person, place and time and normal affect  Lungs:  clear to auscultation bilaterally, normal effort  Heart:  regular rate and rhythm, no murmur  Abdomen:  soft, nontender, nondistended, normal bowel sounds, no masses, hepatomegaly, splenomegaly  Extremities:  no edema, redness, tenderness in the calves, foot dressing in place  Skin:  no gross lesions, rashes, induration    Assessment:        Hospital Problems             Last Modified POA    * (Principal) Acute osteomyelitis of toe of right foot ([address] 8/20/2022 Yes    Hyperglycemia 8/18/2022 Yes    Cellulitis 8/19/2022 Yes    Type 2 diabetes mellitus with diabetic foot infection ([address] 8/21/2022 Yes    Diabetic foot infection ([address] 8/20/2022 Yes       Plan:        Continue local wound care  Antibiotics as ordered, awaiting final determination for antibiotic needs from ID  GI and DVT prophylaxis  Increase insulin scale to moderate intensity  Increase Lantus from 20 to 22 units nightly  Discharge planning pending wound closure per podiatry and final determination of antibiotics    Ann Marie Strickland DO  8/22/2022  11:52 AM

## 2022-08-22 NOTE — PROGRESS NOTES
Patient's spouse stops  and requests that a  visit patient. Spouse states that patient attends [address] [place] [address] or [employer]. Writer checks patient's chart to discover that patient was list with no Restorationist preference. Writer changes patient's [facility] preference to [address] and leaves a note for the morning  to request that patient be visited by the visiting . Patient's spouse expresses appreciation for the ministry. Spiritual Care will follow as needed. 08/22/22 1922   Encounter Summary   Service Provided For: Family   Referral/Consult From: Family   Support System Spouse; Children   Last Encounter  08/22/22   Complexity of Encounter Moderate   Begin Time 1900   End Time  1915   Total Time Calculated 15 min   Encounter    Type Follow up   Assessment/Intervention/Outcome   Assessment Calm   Intervention Active listening;Discussed belief system/Restorationist practices/marlene; Explored/Affirmed feelings, thoughts, concerns;Explored Coping Skills/Resources;Nurtured Hope;Sustaining Presence/Ministry of presence   Outcome Concerns relieved;Expressed Gratitude

## 2022-08-22 NOTE — PROGRESS NOTES
Occupational Therapy  Facility/Department: [facility]  Occupational Therapy Initial Assessment    Name: Alyson Harrell  : 1955  MRN: [id number]  Date of Service: 2022    Discharge Recommendations:  Patient would benefit from continued therapy after discharge      RN reports patient is medically stable for therapy treatment this date. Chart reviewed prior to treatment and patient is agreeable for therapy. All lines intact and patient positioned comfortably at end of treatment. All patient needs addressed prior to ending therapy session. Patient Diagnosis(es): The primary encounter diagnosis was New onset type 2 diabetes mellitus ([facility] Diagnoses of Di[facility] foot [facility] and Osteomyelitis, unspecified site, unspecified type [facility]) were also pertinent to this visit. Past Medical History:  has no past medical history on file. Past Surgical History:  has a past surgical history that includes Tonsillectomy and Toe amputation (Right, 2022). HPI (per chart):   Alyson Harrell is a 78 y.o. male seen at [address] [place] [address],[address] for a wound on the right foot. The patient reports that he does not follow with any doctors, does not take any medications other than vitamins, and does not have any significant past medical history, including diabetes. His wife is present at bedside and they report the wound started approximately 5 weeks ago, unknown cause, and has since become progressively worse. They have been soaking the foot in apple cider vinegar but not applying anything else to the wound    Assessment   Performance deficits / Impairments: Decreased functional mobility ; Decreased strength;Decreased ADL status; Decreased sensation;Decreased balance;Decreased endurance  Assessment: Skilled OT is indicated to increase overall IND and safety with self-care and functional tasks to return to PLOF  Prognosis: Good  Decision Making: Medium Complexity  REQUIRES OT FOLLOW-UP: Yes  Activity on right foot, surgical shoe    Safety Devices  Type of Devices: Gait belt;Call light within reach; Patient at risk for falls; All fall risk precautions in place;Nurse notified; Left in chair;Chair alarm in place  Restraints  Restraints Initially in Place: No    Bed Mobility Training  Bed Mobility Training: Yes  Overall Level of Assistance: Modified independent  Rolling: Modified independent  Supine to Sit: Modified independent  Sit to Supine: Modified independent  Scooting: Modified independent    Balance  Sitting: Intact  Standing:  (supervision)  Transfer Training  Transfer Training: Yes  Overall Level of Assistance: Modified independent  Sit to Stand: Modified independent  Stand to Sit: Modified independent    Gait Training  Gait Training: Yes  Right Side Weight Bearing: Heel  Gait  Overall Level of Assistance: Stand-by assistance (Pt walked from bed to toilet, toilet to sink, sink to door, door to chair)  Interventions: Verbal cues (Min cues for RW safety/mgmt, heel WB precaution, pacing, pursed lip breathing, all to inc safety/reduce fall risk)  Assistive Device: Gait belt;Walker, rolling     AROM: Within functional limits  PROM: Within functional limits  Strength: Generally decreased, functional (~4+/5)  Coordination: Within functional limits  Tone: Normal  Sensation: Intact (decreased sensation in hands and feet)    ADL  Feeding: Modified independent   Grooming: Contact guard assistance  Grooming Skilled Clinical Factors: pt stood at sink with CGA to wash his hands and comb his hair  UE Bathing: Stand by assistance  LE Bathing: Minimal assistance  UE Dressing: Minimal assistance  UE Dressing Skilled Clinical Factors: to adjust hosp gown  LE Dressing: Minimal assistance  Toileting: Contact guard assistance  Toileting Skilled Clinical Factors: pt stood at toilet with CGA, CGA for clothing mgmt       Activity Tolerance  Activity Tolerance: Patient tolerated treatment well  Bed mobility  Supine to Sit: Supervision  Sit to Supine: Unable to assess (pt in bedside chair at end of session)  Scooting: Supervision    Transfers  Sit to stand: Contact guard assistance  Stand to sit: Contact guard assistance  Transfer Comments: Mod VCs for RW safety/mgmt, upright posture, controlled sit<>stand, proper hand placement on stable surface, pacing, heel WB precaution, squaring self/AD to surface, all to inc safety/reduce fall risk    Vision  Vision: Impaired  Vision Exceptions: Wears glasses for reading  Hearing  Hearing: Within functional limits  Cognition  Overall Cognitive Status: WNL  Orientation  Overall Orientation Status: Within Normal Limits  Orientation Level: Oriented X4                  Education Given To: Patient  Education Provided: Role of Therapy; ADL Adaptive Strategies; Fall Prevention Strategies; Plan of Care;Transfer Training;Energy Conservation;Precautions  Education Provided Comments: OT POC, benefits of OOB activity, call light/fall prevention, safety in function, transfer tech  Education Method: Demonstration;Verbal  Education Outcome: Continued education needed                          AM-PAC Score        AM-PAC Inpatient Daily Activity Raw Score: 19 (08/22/22 1224)  AM-PAC Inpatient ADL T-Scale Score : 40.22 (08/22/22 1224)  ADL Inpatient CMS 0-100% Score: 42.8 (08/22/22 1224)  ADL Inpatient CMS G-Code Modifier : CK (08/22/22 1224)         Goals  Short Term Goals  Time Frame for Short term goals: by discharge, pt to demo  Short Term Goal 1: I/MI for bed mob tasks without bed rails  Short Term Goal 2: I/MI for total body ADLs with AE as needed and Good safety  Short Term Goal 3: I/MI for ADL transfers and functional mob with AD and Good safety, while maintaining heel WB precaution  Short Term Goal 4: I/MI for toileting routine with Good safety  Short Term Goal 5: pt to be IND with EC/WS, fall prevention tech, pressure relief education, precautions, discharge recommendations, with use of handouts as needed  Patient Goals   Patient goals : to go home       Therapy Time   Individual Concurrent Group Co-treatment   Time In 1020         Time Out 1049 (+10 chart review)         Minutes 39          Tx time: 29 min    Upon writer exit, call light within reach, pt retired to chair. All lines intact and patient positioned comfortably. All patient needs addressed prior to ending therapy session. Chart reviewed prior to treatment and patient is agreeable for therapy. RN reports patient is medically stable for therapy treatment this date.     Kelli De La Cruz OTR/L

## 2022-08-22 NOTE — PROGRESS NOTES
independent  Balance  Sitting: Intact  Standing:  (supervision)  Transfer Training  Transfer Training: Yes  Overall Level of Assistance: Modified independent  Sit to Stand: Modified independent  Stand to Sit: Modified independent  Gait Training  Gait Training: Yes  Right Side Weight Bearing: Heel  Gait  Overall Level of Assistance: Modified independent  Interventions: Verbal cues  Stance: Right decreased  Gait Abnormalities: Step to gait  Distance (ft): 100 Feet  Assistive Device: Gait belt;Walker, rolling  Pt required cue to keep RW in CAESAR towards end of ambulation when getting fatigued. Safety Devices  Type of Devices: Gait belt;Left in bed;Bed alarm in place;Call light within reach; Patient at risk for falls; All fall risk precautions in place  Restraints  Restraints Initially in Place: No       Goals  Short Term Goals  Time Frame for Short term goals: 12 visits  Short term goal 1: Patient will amb 100 feet indep with RW and 100% compliant with heel WB with surgical shoe. Short term goal 2: Patient will negoatiate 2 stairs with rails and indep with compliance with heel WB. Short term goal 3: Pateint will have good dynamic standing balance.   Patient Goals   Patient goals : Return home, wound healing    Education  Patient Education  Education Given To: Patient  Education Provided: Role of Therapy;Plan of Care;Energy Conservation  Education Provided Comments: Pt educated on RW technique and importance of being up and OOB  Education Method: Verbal  Barriers to Learning: None  Education Outcome: Verbalized understanding    Therapy Time   Individual Concurrent Group Co-treatment   Time In 780 2089         Time Out 0958         Minutes 16                 Nadeem Berrios, student physical therapy assistant under supervision of Cricket Ruffin, [place]

## 2022-08-22 NOTE — PROGRESS NOTES
[facility] Pharmacokinetic Monitoring Service - Vancomycin    Consulting Provider: Dr. Monica Aguirre   Indication: osteomyelitis of right big toe  Target Concentration: Goal AUC/DANIELLE 400-600 mg*hr/L  Day of Therapy: 5  Additional Antimicrobials: Zosyn    Pertinent Laboratory Values: Wt Readings from Last 1 Encounters:   08/20/22 222 lb 4.8 oz (100.8 kg)     Temp Readings from Last 1 Encounters:   08/21/22 97.9 °F (36.6 °C) (Oral)     Estimated Creatinine Clearance: 90 mL/min (based on SCr of 1.02 mg/dL). Recent Labs     08/20/22  0658 08/21/22  0553   CREATININE 0.89 1.02   WBC 14.0* 10.0     Procalcitonin: none    Pertinent Cultures:  Culture Date Source Results   8/19 Right toe Mod Gram +   Light gram - rods   MRSA Nasal Swab: N/A. Non-respiratory infection. Recent vancomycin administrations                     vancomycin 1000 mg IVPB in 250 mL D5W addavial (mg) 1,000 mg New Bag 08/21/22 0215     1,000 mg New Bag 08/20/22 1515     1,000 mg New Bag  0046     1,000 mg Bolus 08/19/22 1423     1,000 mg New Bag  1348    vancomycin 1000 mg IVPB in 250 mL D5W addavial (mg) 1,000 mg New Bag 08/19/22 0132    vancomycin (VANCOCIN) 2,000 mg in dextrose 5 % 500 mL IVPB (mg) 2,000 mg New Bag 08/18/22 1340                      Plan:  Current dosing regimen is therapeutic  Continue current dose of 1000mg IV q 12 hours  Repeat vancomycin concentration ordered for 8/26.    Pharmacy will continue to monitor patient and adjust therapy as indicated    Thank you for the consult,  LUCIAN Mccarthy [facility]  8/22/2022 4:26 PM

## 2022-08-23 LAB
ABSOLUTE EOS #: 0.32 K/UL (ref 0–0.44)
ABSOLUTE IMMATURE GRANULOCYTE: 0.42 K/UL (ref 0–0.3)
ABSOLUTE LYMPH #: 1.69 K/UL (ref 1.1–3.7)
ABSOLUTE MONO #: 0.93 K/UL (ref 0.1–1.2)
ANION GAP SERPL CALCULATED.3IONS-SCNC: 8 MMOL/L (ref 9–17)
BASOPHILS # BLD: 1 % (ref 0–2)
BASOPHILS ABSOLUTE: 0.08 K/UL (ref 0–0.2)
BUN BLDV-MCNC: 13 MG/DL (ref 8–23)
BUN/CREAT BLD: 12 (ref 9–20)
CALCIUM SERPL-MCNC: 8.8 MG/DL (ref 8.6–10.4)
CHLORIDE BLD-SCNC: 101 MMOL/L (ref 98–107)
CO2: 26 MMOL/L (ref 20–31)
CREAT SERPL-MCNC: 1.13 MG/DL (ref 0.7–1.2)
CULTURE: NORMAL
CULTURE: NORMAL
EOSINOPHILS RELATIVE PERCENT: 3 % (ref 1–4)
GFR AFRICAN AMERICAN: >60 ML/MIN
GFR NON-AFRICAN AMERICAN: >60 ML/MIN
GFR SERPL CREATININE-BSD FRML MDRD: ABNORMAL ML/MIN/{1.73_M2}
GLUCOSE BLD-MCNC: 200 MG/DL (ref 75–110)
GLUCOSE BLD-MCNC: 206 MG/DL (ref 75–110)
GLUCOSE BLD-MCNC: 208 MG/DL (ref 70–99)
GLUCOSE BLD-MCNC: 210 MG/DL (ref 75–110)
GLUCOSE BLD-MCNC: 232 MG/DL (ref 75–110)
HCT VFR BLD CALC: 44.9 % (ref 40.7–50.3)
HEMOGLOBIN: 14.8 G/DL (ref 13–17)
IMMATURE GRANULOCYTES: 4 %
LYMPHOCYTES # BLD: 18 % (ref 24–43)
Lab: NORMAL
Lab: NORMAL
MCH RBC QN AUTO: 30.6 PG (ref 25.2–33.5)
MCHC RBC AUTO-ENTMCNC: 33 G/DL (ref 28.4–34.8)
MCV RBC AUTO: 93 FL (ref 82.6–102.9)
MONOCYTES # BLD: 10 % (ref 3–12)
NRBC AUTOMATED: 0 PER 100 WBC
PDW BLD-RTO: 12.1 % (ref 11.8–14.4)
PLATELET # BLD: 283 K/UL (ref 138–453)
PMV BLD AUTO: 9.3 FL (ref 8.1–13.5)
POTASSIUM SERPL-SCNC: 4 MMOL/L (ref 3.7–5.3)
RBC # BLD: 4.83 M/UL (ref 4.21–5.77)
SEG NEUTROPHILS: 64 % (ref 36–65)
SEGMENTED NEUTROPHILS ABSOLUTE COUNT: 6.05 K/UL (ref 1.5–8.1)
SODIUM BLD-SCNC: 135 MMOL/L (ref 135–144)
SPECIMEN DESCRIPTION: NORMAL
SPECIMEN DESCRIPTION: NORMAL
SURGICAL PATHOLOGY REPORT: NORMAL
WBC # BLD: 9.5 K/UL (ref 3.5–11.3)

## 2022-08-23 PROCEDURE — 97110 THERAPEUTIC EXERCISES: CPT

## 2022-08-23 PROCEDURE — 6370000000 HC RX 637 (ALT 250 FOR IP): Performed by: INTERNAL MEDICINE

## 2022-08-23 PROCEDURE — 99232 SBSQ HOSP IP/OBS MODERATE 35: CPT | Performed by: STUDENT IN AN ORGANIZED HEALTH CARE EDUCATION/TRAINING PROGRAM

## 2022-08-23 PROCEDURE — 97530 THERAPEUTIC ACTIVITIES: CPT

## 2022-08-23 PROCEDURE — 80048 BASIC METABOLIC PNL TOTAL CA: CPT

## 2022-08-23 PROCEDURE — 97116 GAIT TRAINING THERAPY: CPT

## 2022-08-23 PROCEDURE — 6360000002 HC RX W HCPCS

## 2022-08-23 PROCEDURE — 2580000003 HC RX 258

## 2022-08-23 PROCEDURE — 82947 ASSAY GLUCOSE BLOOD QUANT: CPT

## 2022-08-23 PROCEDURE — 6360000002 HC RX W HCPCS: Performed by: INTERNAL MEDICINE

## 2022-08-23 PROCEDURE — 6370000000 HC RX 637 (ALT 250 FOR IP): Performed by: STUDENT IN AN ORGANIZED HEALTH CARE EDUCATION/TRAINING PROGRAM

## 2022-08-23 PROCEDURE — 97535 SELF CARE MNGMENT TRAINING: CPT

## 2022-08-23 PROCEDURE — 36415 COLL VENOUS BLD VENIPUNCTURE: CPT

## 2022-08-23 PROCEDURE — 1200000000 HC SEMI PRIVATE

## 2022-08-23 PROCEDURE — 2500000003 HC RX 250 WO HCPCS

## 2022-08-23 PROCEDURE — 2580000003 HC RX 258: Performed by: INTERNAL MEDICINE

## 2022-08-23 PROCEDURE — 99232 SBSQ HOSP IP/OBS MODERATE 35: CPT | Performed by: INTERNAL MEDICINE

## 2022-08-23 PROCEDURE — 85025 COMPLETE CBC W/AUTO DIFF WBC: CPT

## 2022-08-23 RX ORDER — LIDOCAINE HYDROCHLORIDE 10 MG/ML
INJECTION, SOLUTION EPIDURAL; INFILTRATION; INTRACAUDAL; PERINEURAL
Status: COMPLETED
Start: 2022-08-23 | End: 2022-08-23

## 2022-08-23 RX ORDER — GABAPENTIN 100 MG/1
100 CAPSULE ORAL NIGHTLY
Status: DISCONTINUED | OUTPATIENT
Start: 2022-08-23 | End: 2022-08-24 | Stop reason: HOSPADM

## 2022-08-23 RX ORDER — LIDOCAINE HYDROCHLORIDE 10 MG/ML
10 INJECTION, SOLUTION EPIDURAL; INFILTRATION; INTRACAUDAL; PERINEURAL ONCE
Status: CANCELLED | OUTPATIENT
Start: 2022-08-23 | End: 2022-08-23

## 2022-08-23 RX ORDER — LIDOCAINE HYDROCHLORIDE 10 MG/ML
10 INJECTION, SOLUTION EPIDURAL; INFILTRATION; INTRACAUDAL; PERINEURAL ONCE
Status: COMPLETED | OUTPATIENT
Start: 2022-08-23 | End: 2022-08-23

## 2022-08-23 RX ORDER — INSULIN GLARGINE 100 [IU]/ML
25 INJECTION, SOLUTION SUBCUTANEOUS NIGHTLY
Status: DISCONTINUED | OUTPATIENT
Start: 2022-08-23 | End: 2022-08-24 | Stop reason: HOSPADM

## 2022-08-23 RX ADMIN — INSULIN GLARGINE 25 UNITS: 100 INJECTION, SOLUTION SUBCUTANEOUS at 20:38

## 2022-08-23 RX ADMIN — ENOXAPARIN SODIUM 40 MG: 100 INJECTION SUBCUTANEOUS at 09:19

## 2022-08-23 RX ADMIN — SODIUM CHLORIDE, PRESERVATIVE FREE 10 ML: 5 INJECTION INTRAVENOUS at 09:19

## 2022-08-23 RX ADMIN — CEFTRIAXONE SODIUM 2000 MG: 2 INJECTION, POWDER, FOR SOLUTION INTRAMUSCULAR; INTRAVENOUS at 20:46

## 2022-08-23 RX ADMIN — LIDOCAINE HYDROCHLORIDE 10 ML: 10 INJECTION, SOLUTION EPIDURAL; INFILTRATION; INTRACAUDAL; PERINEURAL at 16:58

## 2022-08-23 RX ADMIN — GABAPENTIN 100 MG: 100 CAPSULE ORAL at 20:41

## 2022-08-23 RX ADMIN — INSULIN LISPRO 2 UNITS: 100 INJECTION, SOLUTION INTRAVENOUS; SUBCUTANEOUS at 09:19

## 2022-08-23 RX ADMIN — METRONIDAZOLE 500 MG: 500 TABLET ORAL at 14:53

## 2022-08-23 RX ADMIN — METRONIDAZOLE 500 MG: 500 TABLET ORAL at 20:41

## 2022-08-23 RX ADMIN — INSULIN LISPRO 2 UNITS: 100 INJECTION, SOLUTION INTRAVENOUS; SUBCUTANEOUS at 17:05

## 2022-08-23 RX ADMIN — METRONIDAZOLE 500 MG: 500 TABLET ORAL at 05:34

## 2022-08-23 RX ADMIN — INSULIN LISPRO 2 UNITS: 100 INJECTION, SOLUTION INTRAVENOUS; SUBCUTANEOUS at 12:40

## 2022-08-23 RX ADMIN — SODIUM CHLORIDE, PRESERVATIVE FREE 10 ML: 5 INJECTION INTRAVENOUS at 20:43

## 2022-08-23 ASSESSMENT — ENCOUNTER SYMPTOMS
COLOR CHANGE: 0
CONSTIPATION: 0
PHOTOPHOBIA: 0
RESPIRATORY NEGATIVE: 1
ABDOMINAL DISTENTION: 0
GASTROINTESTINAL NEGATIVE: 1
SHORTNESS OF BREATH: 0
EYE DISCHARGE: 0
CHEST TIGHTNESS: 0
DIARRHEA: 1

## 2022-08-23 NOTE — PROGRESS NOTES
[facility]  Office: [address], DO, Malissashila Schilling, DO, Donte Goff, DO, Nitza Shirk Blood, DO, Leanna Portillo MD, Radha Butler MD, Surinder Wilks MD, Rubio Prather MD,  Cassidy Brown MD, Lelo Luz MD, Misty Paredes, DO, Von Henry MD,  Vannesa Kasper MD, Sofiya Butler MD, Coye Meigs, DO, Len Powell MD, Basil Ovalle MD, Maya Yung MD, Betito Mcclellan DO, Abby Lyons MD, Kristian Rodriguez MD, Ronny Adan, CNP,  Luba Bernal, CNP, Gail Rice, CNP, Valeria Vivas, CNP, Janeth Swenson PA-C, Nandini Pryor, DNP, Boris Moon, CNP, Breanna Baker, CNP, Susan Cervantes, CNP, Leonard Eli, CNP, Tejinder Farley, CNP, Monisha Pierce, CNS, Rina Narvaez, [facility], Vijay Turpin, CNP, Vani Balderas, CNP, Shikha Okeefe, CNP           [facility]   [address]    Progress Note    8/23/2022    1:26 PM    Name:   Margo Morgan  MRN:     [id number]     Acct:      [de-identified]   Room:   2014/2014-02  IP Day:  5  Admit Date:  8/18/2022 10:54 AM    PCP:   No primary care provider on file. Code Status:  Full Code    Subjective:     C/C:   Chief Complaint   Patient presents with    Wound Infection     Right foot infection     Interval History Status: not changed. Pateint mark nd examined at bedside. Wife also at bedside. Patietn performing laps in his room with his walker and in his boot. Doing well. Tells me he at lunch and thinks he may have a bowel movement soon. Brief History:     62-year-old male past medical history of poorly controlled diabetes type 2, peripheral neuropathy, found to have diabetic ulcer of the right foot as well as osteomyelitis status post right hallux amputation 8/19/2022 currently being followed by infectious disease and podiatry. Patient currently on Rocephin and metronidazole with plan for formal closure possibly later this week. Plan for Rocephin till 9/2/2022.     Review of Systems:     Review of Systems   Constitutional:  Negative for activity change, fatigue and fever. HENT:  Negative for congestion and hearing loss. Eyes:  Negative for photophobia and discharge. Respiratory:  Negative for chest tightness and shortness of breath. Cardiovascular:  Negative for chest pain, palpitations and leg swelling. Gastrointestinal:  Positive for diarrhea. Negative for abdominal distention and constipation. Genitourinary:  Negative for difficulty urinating, dysuria and enuresis. Musculoskeletal:  Negative for arthralgias and neck stiffness. Skin:  Negative for color change and rash. Neurological:  Positive for weakness. Negative for dizziness and tremors. Psychiatric/Behavioral:  Negative for agitation and confusion. Medications: Allergies:  No Known Allergies    Current Meds:   Scheduled Meds:    insulin lispro  0-8 Units SubCUTAneous TID WC    insulin lispro  0-4 Units SubCUTAneous Nightly    insulin glargine  22 Units SubCUTAneous Nightly    cefTRIAXone (ROCEPHIN) IV  2,000 mg IntraVENous Q24H    metroNIDAZOLE  500 mg Oral 3 times per day    sodium chloride flush  5-40 mL IntraVENous 2 times per day    enoxaparin  40 mg SubCUTAneous Daily     Continuous Infusions:    dextrose      sodium chloride Stopped (08/22/22 7766)     PRN Meds: glucose, dextrose bolus **OR** dextrose bolus, glucagon (rDNA), dextrose, sodium chloride flush, sodium chloride, potassium chloride **OR** potassium alternative oral replacement **OR** potassium chloride, magnesium sulfate, ondansetron **OR** ondansetron, polyethylene glycol, acetaminophen **OR** acetaminophen, HYDROcodone 5 mg - acetaminophen **OR** HYDROcodone 5 mg - acetaminophen    Data:     Past Medical History:   has no past medical history on file. Social History:   reports that he has never smoked. He has never used smokeless tobacco. He reports that he does not use drugs.      Family History:   Family History   Problem Relation Age of Onset    No Known Problems Mother     No Known Problems Father        Vitals:  BP (!) 100/52   Pulse 93   Temp 97.7 °F (36.5 °C) (Oral)   Resp 16   Ht 6' 2\" (1.88 m)   Wt 220 lb (99.8 kg)   SpO2 97%   BMI 28.25 kg/m²   Temp (24hrs), Av °F (36.7 °C), Min:97.7 °F (36.5 °C), Max:98.2 °F (36.8 °C)    Recent Labs     22  16422  0555 22  1226   POCGLU 271* 247* 206* 210*       I/O (24Hr):     Intake/Output Summary (Last 24 hours) at 2022 1326  Last data filed at 2022 0504  Gross per 24 hour   Intake 1466.96 ml   Output 650 ml   Net 816.96 ml       Labs:  Hematology:  Recent Labs     22  0553 22  0533   WBC 10.0 9.5   RBC 4.49 4.83   HGB 13.7 14.8   HCT 41.6 44.9   MCV 92.7 93.0   MCH 30.5 30.6   MCHC 32.9 33.0   RDW 12.2 12.1    283   MPV 9.8 9.3     Chemistry:  Recent Labs     22  0553 22  0533    135   K 3.9 4.0    101   CO2 28 26   GLUCOSE 231* 208*   BUN 19 13   CREATININE 1.02 1.13   ANIONGAP 8* 8*   LABGLOM >60 >60   GFRAA >60 >60   CALCIUM 8.4* 8.8     Recent Labs     22  0623 22  1117 22  0555 22  1226   POCGLU 257* 250* 271* 247* 206* 210*     ABG:No results found for: POCPH, PHART, PH, POCPCO2, VJR0IZM, PCO2, POCPO2, PO2ART, PO2, POCHCO3, USP4GEE, HCO3, NBEA, PBEA, BEART, BE, THGBART, THB, LCD8JRD, BHIW8ZUF, W6FKZALK, O2SAT, FIO2  Lab Results   Component Value Date/Time    SPECIAL LEFT AC,12ML 2022 11:35 AM     Lab Results   Component Value Date/Time    CULTURE ESCHERICHIA COLI LIGHT GROWTH (A) 2022 02:38 PM    CULTURE NORMAL SKIN [place] 2022 02:38 PM    CULTURE (A) 2022 02:38 PM     BACTEROIDES FRAGILIS MODERATE GROWTH BETA LACTAMASE POSITIVE       Radiology:  XR FOOT RIGHT (MIN 3 VIEWS)    Result Date: 2022  Interval performance of amputation of the great toe at the level of the metatarsal bone with tissue swelling Behavior normal.       Assessment:        Hospital Problems             Last Modified POA    * (Principal) Acute osteomyelitis of toe of right foot ([address] 8/20/2022 Yes    Hyperglycemia 8/18/2022 Yes    Cellulitis 8/19/2022 Yes    Type 2 diabetes mellitus with diabetic foot infection ([address] 8/22/2022 Yes    Diabetic foot infection ([address] 8/20/2022 Yes    Osteomyelitis of great toe of right foot ([address] 8/22/2022 Yes    Diabetic infection of right foot ([address] 8/22/2022 Yes       Plan:        Right foot wound status post right hallux amputation 8/19/2022. Appreciate podiatry recommendations. Plan for formal closure later this week  Appreciate infectious disease recommendations. Continue Rocephin planned till 9/2/2022, continue Flagyl  Diabetes type 2. Lantus increased to 25 units nightly, high-dose sliding scale  Peripheral neuropathy we will start gabapentin nightly  Continue PT and OT, patient also ambulating in the room well with walker.       Estelle MD Ysabel  8/23/2022  1:26 PM

## 2022-08-23 NOTE — PLAN OF CARE
Problem: Discharge Planning  Goal: Discharge to home or other facility with appropriate resources  8/23/2022 0946 by Darion Medellin RN  Outcome: Progressing  Flowsheets (Taken 8/23/2022 0919)  Discharge to home or other facility with appropriate resources: Identify barriers to discharge with patient and caregiver  8/23/2022 0125 by Bill Davis RN  Outcome: Progressing  Flowsheets (Taken 8/22/2022 2000)  Discharge to home or other facility with appropriate resources:   Identify barriers to discharge with patient and caregiver   Arrange for needed discharge resources and transportation as appropriate   Identify discharge learning needs (meds, wound care, etc)   Refer to discharge planning if patient needs post-hospital services based on physician order or complex needs related to functional status, cognitive ability or social support system     Problem: Pain  Goal: Verbalizes/displays adequate comfort level or baseline comfort level  8/23/2022 0946 by Darion Medellin RN  Outcome: Progressing  8/23/2022 0125 by Bill Davis RN  Outcome: Progressing     Problem: Safety - Adult  Goal: Free from fall injury  8/23/2022 0946 by Darion Medellin RN  Outcome: Progressing  8/23/2022 0125 by Bill Davis RN  Outcome: Progressing  Flowsheets (Taken 8/22/2022 2200)  Free From Fall Injury:   Based on caregiver fall risk screen, instruct family/caregiver to ask for assistance with transferring infant if caregiver noted to have fall risk factors   Instruct family/caregiver on patient safety     Problem: ABCDS Injury Assessment  Goal: Absence of physical injury  8/23/2022 0946 by Darion Medellin RN  Outcome: Progressing  8/23/2022 0125 by Bill Davis RN  Outcome: Progressing  Flowsheets (Taken 8/22/2022 2200)  Absence of Physical Injury: Implement safety measures based on patient assessment     Problem: Musculoskeletal - Adult  Goal: Return mobility to safest level of function  8/23/2022 0946 by Darion Medellin RN  Outcome: Progressing  Flowsheets (Taken 8/23/2022 0919)  Return Mobility to Safest Level of Function: Assess patient stability and activity tolerance for standing, transferring and ambulating with or without assistive devices  8/23/2022 0125 by Karen Small RN  Outcome: Progressing  Flowsheets (Taken 8/22/2022 2000)  Return Mobility to Safest Level of Function:   Assess patient stability and activity tolerance for standing, transferring and ambulating with or without assistive devices   Assist with transfers and ambulation using safe patient handling equipment as needed   Ensure adequate protection for wounds/incisions during mobilization   Obtain physical therapy/occupational therapy consults as needed  Goal: Return ADL status to a safe level of function  8/23/2022 0946 by Issac Osborne RN  Outcome: Progressing  Flowsheets (Taken 8/23/2022 0919)  Return ADL Status to a Safe Level of Function: Administer medication as ordered  8/23/2022 0125 by Karen Small RN  Outcome: Progressing  Flowsheets (Taken 8/22/2022 2000)  Return ADL Status to a Safe Level of Function:   Administer medication as ordered   Assess activities of daily living deficits and provide assistive devices as needed   Obtain physical therapy/occupational therapy consults as needed   Assist and instruct patient to increase activity and self care as tolerated     Problem: Metabolic/Fluid and Electrolytes - Adult  Goal: Electrolytes maintained within normal limits  8/23/2022 0946 by Issac Osborne RN  Outcome: Progressing  Flowsheets (Taken 8/23/2022 0919)  Electrolytes maintained within normal limits: Monitor labs and assess patient for signs and symptoms of electrolyte imbalances  8/23/2022 0125 by Karen Small RN  Outcome: Progressing  Flowsheets (Taken 8/22/2022 2000)  Electrolytes maintained within normal limits:   Monitor labs and assess patient for signs and symptoms of electrolyte imbalances   Administer electrolyte replacement as ordered  Goal: Hemodynamic stability and optimal renal function maintained  8/23/2022 0946 by Jere Fournier RN  Outcome: Progressing  Flowsheets (Taken 8/23/2022 7356)  Hemodynamic stability and optimal renal function maintained: Monitor labs and assess for signs and symptoms of volume excess or deficit  8/23/2022 0125 by Dyan Álvarez RN  Outcome: Progressing  Flowsheets (Taken 8/22/2022 2000)  Hemodynamic stability and optimal renal function maintained:   Monitor labs and assess for signs and symptoms of volume excess or deficit   Monitor intake, output and patient weight  Goal: Glucose maintained within prescribed range  8/23/2022 0946 by Jere Fournier RN  Outcome: Progressing  Flowsheets (Taken 8/23/2022 0919)  Glucose maintained within prescribed range: Monitor blood glucose as ordered  8/23/2022 0125 by Dyan Álvarez RN  Outcome: Progressing  Flowsheets (Taken 8/22/2022 2000)  Glucose maintained within prescribed range:   Monitor blood glucose as ordered   Assess for signs and symptoms of hyperglycemia and hypoglycemia   Administer ordered medications to maintain glucose within target range   Assess barriers to adequate nutritional intake and initiate nutrition consult as needed   Instruct patient on self management of diabetes and initiate consult as needed     Problem: Skin/Tissue Integrity - Adult  Goal: Incisions, wounds, or drain sites healing without S/S of infection  8/23/2022 0946 by Jere Fournier RN  Outcome: Progressing  Flowsheets (Taken 8/23/2022 0919)  Incisions, Wounds, or Drain Sites Healing Without Sign and Symptoms of Infection: TWICE DAILY: Assess and document skin integrity  8/23/2022 0125 by Dyan Álvarez RN  Outcome: Progressing  Flowsheets  Taken 8/22/2022 2200  Incisions, Wounds, or Drain Sites Healing Without Sign and Symptoms of Infection: TWICE DAILY: Assess and document skin integrity  Taken 8/22/2022 2000  Incisions, Wounds, or Drain Sites Healing Without Sign and Symptoms of Infection: TWICE DAILY: Assess and document skin integrity     Problem: Neurosensory - Adult  Goal: Achieves stable or improved neurological status  8/23/2022 0946 by Gena Londono RN  Outcome: Progressing  Flowsheets (Taken 8/23/2022 0919)  Achieves stable or improved neurological status: Assess for and report changes in neurological status  8/23/2022 0125 by Kleber Vogt RN  Outcome: Progressing  Flowsheets (Taken 8/22/2022 2000)  Achieves stable or improved neurological status: Assess for and report changes in neurological status     Problem: Respiratory - Adult  Goal: Achieves optimal ventilation and oxygenation  8/23/2022 0946 by Gena Londono RN  Outcome: Progressing  Flowsheets (Taken 8/23/2022 0919)  Achieves optimal ventilation and oxygenation: Assess for changes in respiratory status  8/23/2022 0125 by Kleber Vogt RN  Outcome: Progressing  Flowsheets (Taken 8/22/2022 2000)  Achieves optimal ventilation and oxygenation: Assess for changes in respiratory status     Problem: Chronic Conditions and Co-morbidities  Goal: Patient's chronic conditions and co-morbidity symptoms are monitored and maintained or improved  8/23/2022 0946 by Gena Londono RN  Outcome: Progressing  Flowsheets (Taken 8/23/2022 0919)  Care Plan - Patient's Chronic Conditions and Co-Morbidity Symptoms are Monitored and Maintained or Improved: Monitor and assess patient's chronic conditions and comorbid symptoms for stability, deterioration, or improvement  8/23/2022 0125 by Kleber Vogt RN  Outcome: Progressing  Flowsheets (Taken 8/22/2022 2000)  Care Plan - Patient's Chronic Conditions and Co-Morbidity Symptoms are Monitored and Maintained or Improved:   Monitor and assess patient's chronic conditions and comorbid symptoms for stability, deterioration, or improvement   Collaborate with multidisciplinary team to address chronic and comorbid conditions and prevent exacerbation or deterioration   Update acute care plan with appropriate goals if chronic or comorbid symptoms are exacerbated and prevent overall improvement and discharge     Problem: Skin/Tissue Integrity  Goal: Absence of new skin breakdown  Description: 1. Monitor for areas of redness and/or skin breakdown  2. Assess vascular access sites hourly  3. Every 4-6 hours minimum:  Change oxygen saturation probe site  4. Every 4-6 hours:  If on nasal continuous positive airway pressure, respiratory therapy assess nares and determine need for appliance change or resting period.   8/23/2022 0946 by Yosi Fried RN  Outcome: Progressing  8/23/2022 0125 by Ramin Hayes RN  Outcome: Progressing

## 2022-08-23 NOTE — PROGRESS NOTES
Progress Note  Podiatric Medicine and Surgery     Subjective     CC: S/p amputation of the right hallux 8/19/2022, Right foot infection    Patient seen and examined at bedside. No acute events overnight. Afebrile, vital signs stable   Reports mild pain to right hallux, no further pedal complaints at this time      HPI :  Nasreen Quintanilla is a 78 y.o. male seen at [facility] [address] for a wound on the right foot. The patient reports that he does not follow with any doctors, does not take any medications other than vitamins, and does not have any significant past medical history, including diabetes. His wife is present at bedside and they report the wound started approximately 5 weeks ago, unknown cause, and has since become progressively worse. They have been soaking the foot in apple cider vinegar but not applying anything else to the wound. Patient states he doesn't smoke or drink alcohol. He has no complaints regarding the left foot. ROS: Denies N/V/F/C/SOB/CP. Otherwise negative except at stated in the HPI.      Medications:  Scheduled Meds:   insulin lispro  0-8 Units SubCUTAneous TID WC    insulin lispro  0-4 Units SubCUTAneous Nightly    insulin glargine  22 Units SubCUTAneous Nightly    cefTRIAXone (ROCEPHIN) IV  2,000 mg IntraVENous Q24H    metroNIDAZOLE  500 mg Oral 3 times per day    sodium chloride flush  5-40 mL IntraVENous 2 times per day    enoxaparin  40 mg SubCUTAneous Daily       Continuous Infusions:   dextrose      sodium chloride Stopped (08/22/22 2304)       PRN Meds:glucose, dextrose bolus **OR** dextrose bolus, glucagon (rDNA), dextrose, sodium chloride flush, sodium chloride, potassium chloride **OR** potassium alternative oral replacement **OR** potassium chloride, magnesium sulfate, ondansetron **OR** ondansetron, polyethylene glycol, acetaminophen **OR** acetaminophen, HYDROcodone 5 mg - acetaminophen **OR** HYDROcodone 5 mg - acetaminophen    Objective     Vitals:  Patient Vitals for the past 8 hrs:   BP Temp Temp src Pulse Resp SpO2   22 1139 (!) 100/52 97.7 °F (36.5 °C) Oral 93 16 97 %   22 0809 105/64 98.1 °F (36.7 °C) Oral 94 16 97 %     Average, Min, and Max for last 24 hours Vitals:  TEMPERATURE:  Temp  Av °F (36.7 °C)  Min: 97.7 °F (36.5 °C)  Max: 98.2 °F (36.8 °C)    RESPIRATIONS RANGE: Resp  Av.7  Min: 16  Max: 18    PULSE RANGE: Pulse  Av.8  Min: 80  Max: 94    BLOOD PRESSURE RANGE:  Systolic (04ACN), TRT:333 , Min:100 , NGI:689   ; Diastolic (41APH), GTL:51, Min:52, Max:81      PULSE OXIMETRY RANGE: SpO2  Av.5 %  Min: 92 %  Max: 97 %    I/O last 3 completed shifts: In: 1467 [P.O.:480; I.V.:165.3; IV Piggyback:821.7]  Out: 1950 [Urine:1950]    CBC:  Recent Labs     22  0553 22  0533   WBC 10.0 9.5   HGB 13.7 14.8   HCT 41.6 44.9    283        BMP:  Recent Labs     22  0553 22  0533    135   K 3.9 4.0    101   CO2 28 26   BUN 19 13   CREATININE 1.02 1.13   GLUCOSE 231* 208*   CALCIUM 8.4* 8.8        Coags:  No results for input(s): APTT, PROT, INR in the last 72 hours. Lab Results   Component Value Date    SEDRATE 81 (H) 2022     No results for input(s): CRP in the last 72 hours. Lower Extremity Physical Exam:   Vascular: DP and PT pulses are Palpable . CFT <3 seconds to all digits. Hair growth is absent to the level of the digits. Moderate non-pitting edema to the right lower extremity. Neuro: Saph/sural/SP/DP/plantar sensation diminished to light touch. Musculoskeletal: Muscle strength is adequate ROM, adequate strength to all lower extremity muscle groups. Gross deformity is absent. Compartments compressible. Dermatologic: Iatrogenic wound noted to the amputation site of the hallux which was left open for drainage. No purulence noted today, skin edges appear healthy without necrotic tissue. Erythema significantly improved since yesterday.     Clinical Images:          Imaging:   XR FOOT RIGHT (MIN 3 VIEWS)   Final Result   Interval performance of amputation of the great toe at the level of the   metatarsal bone with tissue swelling and probable packing material long the   amputation site. MRI FOOT RIGHT W WO CONTRAST   Final Result   1. Soft tissue ulceration at the great toe with underlying complex rim   enhancing collection measuring 2.3 x 2.8 x 3.6 cm most compatible with   abscess. 2. Osteomyelitis of the proximal and distal phalanges of the right great toe. 3. Diffuse soft tissue edema with associated soft tissue enhancement   consistent with cellulitis. Edema is most pronounced at the soft tissues of   the great toe. XR FOOT RIGHT (MIN 3 VIEWS)   Final Result   Mildly heterogeneous attenuation of the 1st distal phalanx and osteomyelitis   is a consideration. Diffuse soft tissue swelling with soft tissue laceration or defect of the 1st   digit. IR ULTRASOUND GUIDANCE VASCULAR ACCESS    (Results Pending)       Cultures:   Culture, Tissue [[id number]] (Abnormal) Collected: 08/19/22 8561   Order Status: Completed Specimen: Tissue from Foot Updated: 08/21/22 1138    Specimen Description . TOE RT GREAT    Direct Exam RARE NEUTROPHILS Abnormal      MODERATE GRAM POSITIVE COCCI IN CLUSTERS Abnormal     Culture GRAM NEGATIVE RODS LIGHT GROWTH Abnormal      NORMAL SKIN [place]       Assessment   Jurgen Salinas is a 78 y.o. male with   Status post amputation of right hallux   diabetic foot ulcer to the level of tendon; right hallux  Osteomyelitis of right hallux  Cellulitis; RLE  Undiagnosed DM2 secondary to peripheral neuropathy    Principal Problem:    Acute osteomyelitis of toe of right foot (HCC)  Active Problems:    Hyperglycemia    Cellulitis    Type 2 diabetes mellitus with diabetic foot infection ([address]    Diabetic foot infection ([address]    Osteomyelitis of great toe of right foot ([address]    Diabetic infection of right foot ([address]  Resolved Problems:    * No resolved hospital problems. *       Plan     Patient examined and evaluated at bedside. Continue medical management per Internal Medicine. Abx per infectious disease: IV rocephin and PO metronidazole, appreciate recommendations for discharge  Bedside washout and delayed primary closure performed of right foot with 3-0 Nylon. Dressings to right foot: adaptic, 4x4 gauze, Kerlix, Ace  Patient is stable for discharge on podiatry standpoint, will follow outpatient  Dressings to remain clean, dry, intact until outpatient follow up with Dr. Gema Daugherty to heel of operative foot. Patient care discussed with  Dr. Yan Bush.     Eddie Guaman DPM   Podiatric Medicine & Surgery   8/23/2022 at 1:13 PM

## 2022-08-23 NOTE — DISCHARGE INSTR - COC
Dr. Dia Salvador    Patient's personal belongings (please select all that are sent with patient):  Glasses    RN SIGNATURE:  Electronically signed by Osvaldo Palma RN on 8/24/22 at 11:36 AM EDT    CASE MANAGEMENT/SOCIAL WORK SECTION    Inpatient Status Date: ***    Readmission Risk Assessment Score:  Readmission Risk              Risk of Unplanned Readmission:  9           Discharging to Facility/ Agency   Name: [facility]  Address:  Phone: [phone]  Fax: [phone]  [facility] to deliver IV antibiotics - [phone]    / signature: Electronically signed by Chance Aquino RN on 8/23/22 at 11:01 AM EDT    PHYSICIAN SECTION    Prognosis: Good    Condition at Discharge: Stable    Rehab Potential (if transferring to Rehab): Good    Recommended Labs or Other Treatments After Discharge: Keep surgical dressings clean, dry, intact until follow-up outpatient. No other labs, Rocephin till 9/2/22. Remove midline after completion of antibiotics    Physician Certification: I certify the above information and transfer of Havery Books  is necessary for the continuing treatment of the diagnosis listed and that he requires [address] for less 30 days.      Update Admission H&P: No change in H&P    PHYSICIAN SIGNATURE:  Electronically signed by Kathleen Ordonez MD on 8/24/2022 at 10:02 AM

## 2022-08-23 NOTE — PROGRESS NOTES
[facility]  Progress Note    Kvng Robles  MRN: [id number]  Date: 8/23/2022  LOS: 5     Reason for F/U :   Diabetic foot infection, osteomyelitis    Impression :   Right foot great toe diabetic foot ulcer with associated infection, osteomyelitis and abscess  Status post right hallux amputation 8/19/2022  Right great toe tissue culture 8/19/2022 with E. coli and Bacteroides fragilis  Diabetes mellitus type 2 with associated peripheral neuropathy    Recommendations: The patient initially received Zosyn and vancomycin   The patient is now on Rocephin and oral metronidazole started 8/22/2022  Currently the right great toe amp site has been left open and the plan is for closure sometime this week  The patient will need to continue on the Rocephin through 9/2/2022    Infection Control Recommendations:   Universal precautions    Discharge Planning:   Estimated Length of IV antimicrobials: 9/2/2022  Patient will need Midline Catheter Insertion  Patient will need: Home IV   Patient willneed outpatient wound care: Yes    Medical Decision making / Summary of Stay:   Kvng Robles is a 78y.o.-year-old male who was initially admitted on 8/18/2022. Yusuf Kovacs is seen and evaluated at bedside and his wife was in the room at the time of my evaluation. He reports that he developed a wound on his right great toe about 5 weeks ago that he had not sought any medical treatment for. He then reports that he was walking for quite some time and visiting with his grandkids and since that time his wound has become progressively worse with soft tissue swelling and he reports that since he has been admitted he has had some fevers and chills but did not have any at home. Patient has since been admitted seen by the podiatry service and started on broad-spectrum antimicrobial therapy. Work-up included MRI imaging that has shown osteomyelitis and the patient is scheduled for surgery later today.   I was asked to evaluate and help with antibiotic choice. The patient underwent right hallux amputation on 2022 and moderate amount of purulent drainage was expressed from the surgical site and the toe was disarticulated at the MPJ and all purulent areas expressed from the area and tendons were identified and resected and the site was left open to drain. Current evaluation:2022    /64   Pulse 94   Temp 98.1 °F (36.7 °C) (Oral)   Resp 16   Ht 6' 2\" (1.88 m)   Wt 220 lb (99.8 kg)   SpO2 97%   BMI 28.25 kg/m²     Temperature Range: Temp: 98.1 °F (36.7 °C) Temp  Av °F (36.7 °C)  Min: 97.5 °F (36.4 °C)  Max: 98.2 °F (36.8 °C)  The patient is seen and evaluated at bedside he is awake and alert in no acute distress no subjective fever or chills. No abdominal pain nausea vomiting or diarrhea. No chest pains or palpitations. Still has an open wound. Was able to walk with physical therapy    Review of Systems   Constitutional: Negative. HENT: Negative. Respiratory: Negative. Cardiovascular: Negative. Gastrointestinal: Negative. Genitourinary: Negative. Musculoskeletal: Negative. Skin:  Positive for wound. Neurological: Negative. Psychiatric/Behavioral: Negative. Physical Examination :     Physical Exam  Constitutional:       Appearance: He is well-developed. HENT:      Head: Normocephalic and atraumatic. Cardiovascular:      Rate and Rhythm: Normal rate. Heart sounds: Normal heart sounds. No friction rub. No gallop. Pulmonary:      Effort: Pulmonary effort is normal.      Breath sounds: Normal breath sounds. No wheezing. Abdominal:      General: Bowel sounds are normal.      Palpations: Abdomen is soft. There is no mass. Tenderness: There is no abdominal tenderness. Musculoskeletal:         General: Normal range of motion. Cervical back: Normal range of motion and neck supple. Lymphadenopathy:      Cervical: No cervical adenopathy. Interpretation Microscan Method Status    ampicillin Sensitive 4 BACTERIAL SUSCEPTIBILITY PANEL DANIELLE Preliminary    aztreonam Sensitive <=1 BACTERIAL SUSCEPTIBILITY PANEL DANIELLE Preliminary    ceFAZolin Sensitive <=4 BACTERIAL SUSCEPTIBILITY PANEL DANIELLE Preliminary    cefTRIAXone Sensitive <=1 BACTERIAL SUSCEPTIBILITY PANEL DANIELLE Preliminary    ciprofloxacin Sensitive <=0.25 BACTERIAL SUSCEPTIBILITY PANEL DANIELLE Preliminary    Confirmatory Extended Spectrum Beta-Lactamase Negative NEGATIVE BACTERIAL SUSCEPTIBILITY PANEL DANIELLE Preliminary    gentamicin Sensitive <=1 BACTERIAL SUSCEPTIBILITY PANEL DANIELLE Preliminary    tobramycin Sensitive <=1 BACTERIAL SUSCEPTIBILITY PANEL DANIELLE Preliminary    trimethoprim-sulfamethoxazole Sensitive <=20 BACTERIAL SUSCEPTIBILITY PANEL DANIELLE Preliminary    piperacillin-tazobactam Sensitive <=4 BACTERIAL SUSCEPTIBILITY PANEL DANIELLE Preliminary          Specimen Collected: 08/19/22 14:38 EDT Last Resulted: 08/22/22 13:27 EDT        Culture, Blood 1 [[id number]] Collected: 08/18/22 1135   Order Status: Completed Specimen: Blood Updated: 08/22/22 1400    Specimen Description . BLOOD    Special Requests LEFT AC,12ML    Culture NO GROWTH 4 DAYS   Culture, Blood 1 [[id number]] Collected: 08/18/22 1130   Order Status: Completed Specimen: Blood Updated: 08/22/22 1400    Specimen Description . BLOOD    Special Requests RFA,12ML    Culture NO GROWTH 4 DAYS       Medications:      insulin lispro  0-8 Units SubCUTAneous TID WC    insulin lispro  0-4 Units SubCUTAneous Nightly    insulin glargine  22 Units SubCUTAneous Nightly    cefTRIAXone (ROCEPHIN) IV  2,000 mg IntraVENous Q24H    metroNIDAZOLE  500 mg Oral 3 times per day    sodium chloride flush  5-40 mL IntraVENous 2 times per day    enoxaparin  40 mg SubCUTAneous Daily           [facility]  Sonia Singh MD  [employer] messaging  OFFICE: [phone]      Electronically signed by Sonia Singh MD on 8/23/2022 at 10:52 AM  Thank you for allowing us to participate in the care of this patient. Please call with questions. This note iscreated with the assistance of a speech recognition program.  While intending to generate a document that actually reflects the content of the visit, the document can still have some errors including those of syntax andsound a like substitutions which may escape proof reading. In such instances, actual meaning can be extrapolated by contextual diversion.

## 2022-08-23 NOTE — PROGRESS NOTES
08/23/22 0919   Encounter Summary   Encounter Overview/Reason   Encounter  (8/23/22)   Service Provided For: Patient   Referral/Consult From: Kady   Last Encounter  08/23/22  (anointing)   Complexity of Encounter Low   Rituals, Rites and Sacraments   Type Anointing

## 2022-08-23 NOTE — PROGRESS NOTES
Occupational Therapy  Facility/Department: [facility] MED SURG  Rehabilitation Occupational Therapy Daily Treatment Note    Date: 22  Patient Name: Gloria Sommers       Room:   MRN: [id number]  Account: [de-identified]   : 1955  (69 y.o.) Gender: male      SHANE Bee reports patient is medically stable for therapy treatment this date. Chart reviewed prior to treatment and patient is agreeable for therapy. All lines intact and patient positioned comfortably at end of treatment. All patient needs addressed prior to ending therapy session. Past Medical History:  has no past medical history on file. Past Surgical History:   has a past surgical history that includes Tonsillectomy and Toe amputation (Right, 2022). Restrictions  Restrictions/Precautions: General Precautions;Weight Bearing  Required Braces or Orthoses  Right Lower Extremity Brace:  (sx shoe)  Required Braces or Orthoses?: Yes (surgical shoe)    Subjective  Subjective: Pt resting in bed agreeable to therapy. Restrictions/Precautions: General Precautions;Weight Bearing             Objective     Cognition  Overall Cognitive Status: WNL  Orientation  Overall Orientation Status: Within Normal Limits  Orientation Level: Oriented X4         ADL             Functional Mobility  Assistance Level: Contact guard assist  Skilled Clinical Factors: Pt took 4-5 steps from EOB to chair w/o device per request of pt. Pt unsteady but no LOBs. Edu benefits of RW for assisting with balance and WB status. Mod vc's for slow/controlled movement, visual scanning, pacing, upright posture, and pursed lip breathing. Bed Mobility  Overall Assistance Level: Independent  Additional Factors: Without handrails; Head of bed raised  Sit to Supine  Assistance Level:  (pt retired to chair)  Supine to [employer] Level: Independent  Scooting  Assistance Level: Independent  Transfers  Additional Factors: Hand placement cues; Verbal cues  Sit to Stand  Assistance Level: Contact guard assist  Skilled Clinical Factors: Min vc's for slow/controlled sit/stand, pursed lip breathing, squaring self and reaching back prior to sitting, upright posture, and pacing. Stand to Sit  Assistance Level: Contact guard assist   OT Exercises  Resistive Exercises: Pt completed UB theraband exercises with orange band while seated in chair for 1 set of 10 reps in all planes to increase UB strength/endurance for increased IND with ADLs/transfers/daily tasks. Assessment  Assessment  Assessment: Pt tolerated treamtment well agreeable and receptive to all edu and cueing given. Pt progressing toward goals. Skilled OT is indicated to increase overall IND and safety with self-care and functional tasks to return to PLOF. Activity Tolerance: Patient tolerated treatment well  Discharge Recommendations: Patient would benefit from continued therapy after discharge  Safety Devices  Safety Devices in place: Yes  Type of devices: Left in chair;Nurse notified;Call light within reach;Gait belt    Patient Education  Education  Education Given To: Patient; Family (pt's wife)  Education Provided: Role of Therapy;Plan of Care;Safety;Home Exercise Program  Education Method: Verbal;Demonstration;Printed Information/Hand-outs  Barriers to Learning: None  Education Outcome: Verbalized understanding    Access Code: [email or web address]  URL: [email or web address]  Date: 08/23/2022  Prepared by:  Deo Vargas II    Exercises  Seated Shoulder Horizontal Abduction with Resistance - 1 x daily - 7 x weekly - 3 sets - 10 reps  Seated Elbow Flexion with Self-Anchored Resistance - 1 x daily - 7 x weekly - 3 sets - 10 reps  Seated Shoulder Flexion with Self-Anchored Resistance - 1 x daily - 7 x weekly - 3 sets - 10 reps  Seated Single Shoulder PNF D2 with Resistance - 1 x daily - 7 x weekly - 3 sets - 10 reps  Standing Shoulder Row with Anchored Resistance - 1 x daily - 7 x weekly - 3 sets - 10 reps  Chest Press with Resistance - 1 x daily - 7 x weekly - 3 sets - 10 reps      Plan  Plan  Times per Week: 4-5x per week, 1-2x per day as olga  Current Treatment Recommendations: Strengthening;Balance training;Functional mobility training; Endurance training; Safety education & training;Patient/Caregiver education & training;Equipment evaluation, education, & procurement;Self-Care / ADL;Positioning    Goals  Patient Goals   Patient goals : to go home  Short Term Goals  Time Frame for Short term goals: by discharge, pt to demo  Short Term Goal 1: I/MI for bed mob tasks without bed rails  Short Term Goal 2: I/MI for total body ADLs with AE as needed and Good safety  Short Term Goal 3: I/MI for ADL transfers and functional mob with AD and Good safety, while maintaining heel WB precaution  Short Term Goal 4: I/MI for toileting routine with Good safety  Short Term Goal 5: pt to be IND with EC/WS, fall prevention tech, pressure relief education, precautions, discharge recommendations, with use of handouts as needed    AM-PAC Score        AM-PAC Inpatient Daily Activity Raw Score: 19 (08/23/22 1046)  AM-PAC Inpatient ADL T-Scale Score : 40.22 (08/23/22 1046)  ADL Inpatient CMS 0-100% Score: 42.8 (08/23/22 1046)  ADL Inpatient CMS G-Code Modifier : CK (08/23/22 1046)      Therapy Time   Individual Concurrent Group Co-treatment   Time In 1027         Time Out 1111         Minutes Seferino Fletcher MATTHEW

## 2022-08-23 NOTE — PLAN OF CARE
Problem: Discharge Planning  Goal: Discharge to home or other facility with appropriate resources  8/23/2022 0125 by Lachelle Cantor RN  Outcome: Progressing  Flowsheets (Taken 8/22/2022 2000)  Discharge to home or other facility with appropriate resources:   Identify barriers to discharge with patient and caregiver   Arrange for needed discharge resources and transportation as appropriate   Identify discharge learning needs (meds, wound care, etc)   Refer to discharge planning if patient needs post-hospital services based on physician order or complex needs related to functional status, cognitive ability or social support system     Problem: Pain  Goal: Verbalizes/displays adequate comfort level or baseline comfort level  8/23/2022 0125 by Lachelle Cantor RN  Outcome: Progressing     Problem: Safety - Adult  Goal: Free from fall injury  8/23/2022 0125 by Lachelle Cantor RN  Outcome: Progressing  Flowsheets (Taken 8/22/2022 2200)  Free From Fall Injury:   Based on caregiver fall risk screen, instruct family/caregiver to ask for assistance with transferring infant if caregiver noted to have fall risk factors   Instruct family/caregiver on patient safety     Problem: ABCDS Injury Assessment  Goal: Absence of physical injury  8/23/2022 0125 by Lachelle Cantor RN  Outcome: Progressing  Flowsheets (Taken 8/22/2022 2200)  Absence of Physical Injury: Implement safety measures based on patient assessment     Problem: Musculoskeletal - Adult  Goal: Return mobility to safest level of function  8/23/2022 0125 by Lachelle Cantor RN  Outcome: Progressing  Flowsheets (Taken 8/22/2022 2000)  Return Mobility to Safest Level of Function:   Assess patient stability and activity tolerance for standing, transferring and ambulating with or without assistive devices   Assist with transfers and ambulation using safe patient handling equipment as needed   Ensure adequate protection for wounds/incisions during mobilization   Obtain physical therapy/occupational therapy consults as needed     Problem: Musculoskeletal - Adult  Goal: Return ADL status to a safe level of function  8/23/2022 0125 by Kleber Vogt RN  Outcome: Progressing  Flowsheets (Taken 8/22/2022 2000)  Return ADL Status to a Safe Level of Function:   Administer medication as ordered   Assess activities of daily living deficits and provide assistive devices as needed   Obtain physical therapy/occupational therapy consults as needed   Assist and instruct patient to increase activity and self care as tolerated     Problem: Metabolic/Fluid and Electrolytes - Adult  Goal: Electrolytes maintained within normal limits  8/23/2022 0125 by Kleber Vogt RN  Outcome: Progressing  Flowsheets (Taken 8/22/2022 2000)  Electrolytes maintained within normal limits:   Monitor labs and assess patient for signs and symptoms of electrolyte imbalances   Administer electrolyte replacement as ordered     Problem: Metabolic/Fluid and Electrolytes - Adult  Goal: Hemodynamic stability and optimal renal function maintained  8/23/2022 0125 by Kleber Vogt RN  Outcome: Progressing  Flowsheets (Taken 8/22/2022 2000)  Hemodynamic stability and optimal renal function maintained:   Monitor labs and assess for signs and symptoms of volume excess or deficit   Monitor intake, output and patient weight     Problem: Metabolic/Fluid and Electrolytes - Adult  Goal: Glucose maintained within prescribed range  8/23/2022 0125 by Kleber Vogt RN  Outcome: Progressing  Flowsheets (Taken 8/22/2022 2000)  Glucose maintained within prescribed range:   Monitor blood glucose as ordered   Assess for signs and symptoms of hyperglycemia and hypoglycemia   Administer ordered medications to maintain glucose within target range   Assess barriers to adequate nutritional intake and initiate nutrition consult as needed   Instruct patient on self management of diabetes and initiate consult as needed     Problem: Skin/Tissue Integrity - Adult  Goal: Incisions, wounds, or drain sites healing without S/S of infection  8/23/2022 0125 by Richardson Mabry RN  Outcome: Progressing  Flowsheets  Taken 8/22/2022 2200  Incisions, Wounds, or Drain Sites Healing Without Sign and Symptoms of Infection: TWICE DAILY: Assess and document skin integrity  Taken 8/22/2022 2000  Incisions, Wounds, or Drain Sites Healing Without Sign and Symptoms of Infection: TWICE DAILY: Assess and document skin integrity     Problem: Neurosensory - Adult  Goal: Achieves stable or improved neurological status  8/23/2022 0125 by Richardson Mabry RN  Outcome: Progressing  Flowsheets (Taken 8/22/2022 2000)  Achieves stable or improved neurological status: Assess for and report changes in neurological status     Problem: Respiratory - Adult  Goal: Achieves optimal ventilation and oxygenation  8/23/2022 0125 by Richardson Mabry RN  Outcome: Progressing  Flowsheets (Taken 8/22/2022 2000)  Achieves optimal ventilation and oxygenation: Assess for changes in respiratory status     Problem: Chronic Conditions and Co-morbidities  Goal: Patient's chronic conditions and co-morbidity symptoms are monitored and maintained or improved  8/23/2022 0125 by Richardson Mabry RN  Outcome: Progressing  Flowsheets (Taken 8/22/2022 2000)  Care Plan - Patient's Chronic Conditions and Co-Morbidity Symptoms are Monitored and Maintained or Improved:   Monitor and assess patient's chronic conditions and comorbid symptoms for stability, deterioration, or improvement   Collaborate with multidisciplinary team to address chronic and comorbid conditions and prevent exacerbation or deterioration   Update acute care plan with appropriate goals if chronic or comorbid symptoms are exacerbated and prevent overall improvement and discharge

## 2022-08-23 NOTE — PROGRESS NOTES
independent  Supine to Sit: Modified independent  Sit to Supine: Modified independent  Scooting: Modified independent  Balance  Sitting: Intact  Standing: Intact  Transfer Training  Transfer Training: Yes  Overall Level of Assistance: Modified independent  Sit to Stand: Modified independent  Stand to Sit: Modified independent  Gait Training  Gait Training: Yes  Right Side Weight Bearing: Heel  Gait  Overall Level of Assistance: Supervision  Interventions: Safety awareness training  Speed/Lisa: Slow  Gait Abnormalities:  (R foot ER)  Distance (ft): 150 Feet  Assistive Device: Gait belt;Walker, rolling  Pt demo'd improved gait from previous tx. Good motivation for PT. PT Exercises  Exercise Treatment:   Supine glute sets, SLR, ankle pumps, quad set x5   Sitting marches, LAQ, heel/toe raises, add/abd x5     Safety Devices  Type of Devices: Left in bed;Gait belt;Call light within reach  Restraints  Restraints Initially in Place: No       Goals  Short Term Goals  Time Frame for Short term goals: 12 visits  Short term goal 1: Patient will amb 100 feet indep with RW and 100% compliant with heel WB with surgical shoe. Short term goal 2: Patient will negoatiate 2 stairs with rails and indep with compliance with heel WB. Short term goal 3: Pateint will have good dynamic standing balance. Patient Goals   Patient goals : Return home, wound healing    Education  Patient Education  Education Given To: Patient; Family  Education Provided: Role of Therapy;Plan of Care;Home Exercise Program;Precautions  Education Provided Comments: Pt educated on R heel weight bearing precautions and seated/supine LE HEP  Education Method: Printed Information/Hand-outs;Demonstration;Verbal  Barriers to Learning: None  Education Outcome: Demonstrated understanding;Verbalized understanding    Therapy Time   Individual Concurrent Group Co-treatment   Time In 0940         Time Out 1005         Minutes Silvano student physical therapy assistant under supervision of Gabe Reed [place]

## 2022-08-23 NOTE — CARE COORDINATION
Discussed IV cost with pt and wife and agrees to [facility]. Requesting Bryan Ngo with [facility] and referral called to [facility] and they can accept. Script for IV Rocephin 2 GM daily through 9-2 faxed to [facility].

## 2022-08-24 VITALS
WEIGHT: 235.2 LBS | HEIGHT: 74 IN | HEART RATE: 83 BPM | SYSTOLIC BLOOD PRESSURE: 123 MMHG | DIASTOLIC BLOOD PRESSURE: 80 MMHG | BODY MASS INDEX: 30.18 KG/M2 | TEMPERATURE: 97.2 F | OXYGEN SATURATION: 94 % | RESPIRATION RATE: 16 BRPM

## 2022-08-24 PROBLEM — M46.28 OSTEOMYELITIS OF VERTEBRA, SACRAL AND SACROCOCCYGEAL REGION (HCC): Status: ACTIVE | Noted: 2022-08-24

## 2022-08-24 LAB
GLUCOSE BLD-MCNC: 172 MG/DL (ref 75–110)
GLUCOSE BLD-MCNC: 183 MG/DL (ref 75–110)
GLUCOSE BLD-MCNC: 219 MG/DL (ref 75–110)

## 2022-08-24 PROCEDURE — 6360000002 HC RX W HCPCS

## 2022-08-24 PROCEDURE — 2580000003 HC RX 258: Performed by: INTERNAL MEDICINE

## 2022-08-24 PROCEDURE — 99232 SBSQ HOSP IP/OBS MODERATE 35: CPT | Performed by: NURSE PRACTITIONER

## 2022-08-24 PROCEDURE — 82947 ASSAY GLUCOSE BLOOD QUANT: CPT

## 2022-08-24 PROCEDURE — 6360000002 HC RX W HCPCS: Performed by: INTERNAL MEDICINE

## 2022-08-24 PROCEDURE — 99232 SBSQ HOSP IP/OBS MODERATE 35: CPT | Performed by: STUDENT IN AN ORGANIZED HEALTH CARE EDUCATION/TRAINING PROGRAM

## 2022-08-24 PROCEDURE — 2580000003 HC RX 258

## 2022-08-24 PROCEDURE — 05HC33Z INSERTION OF INFUSION DEVICE INTO LEFT BASILIC VEIN, PERCUTANEOUS APPROACH: ICD-10-PCS | Performed by: PODIATRIST

## 2022-08-24 PROCEDURE — 6370000000 HC RX 637 (ALT 250 FOR IP): Performed by: INTERNAL MEDICINE

## 2022-08-24 PROCEDURE — 76937 US GUIDE VASCULAR ACCESS: CPT

## 2022-08-24 PROCEDURE — 36410 VNPNXR 3YR/> PHY/QHP DX/THER: CPT

## 2022-08-24 RX ORDER — LANCETS 30 GAUGE
1 EACH MISCELLANEOUS 4 TIMES DAILY
Qty: 200 EACH | Refills: 0 | Status: SHIPPED | OUTPATIENT
Start: 2022-08-24

## 2022-08-24 RX ORDER — GLUCOSAMINE HCL/CHONDROITIN SU 500-400 MG
CAPSULE ORAL
Qty: 150 STRIP | Refills: 2 | Status: SHIPPED | OUTPATIENT
Start: 2022-08-24

## 2022-08-24 RX ORDER — LANCETS 30 GAUGE
1 EACH MISCELLANEOUS 4 TIMES DAILY
Qty: 200 EACH | Refills: 0 | Status: SHIPPED | OUTPATIENT
Start: 2022-08-24 | End: 2022-08-24 | Stop reason: SDUPTHER

## 2022-08-24 RX ORDER — HYDROCODONE BITARTRATE AND ACETAMINOPHEN 5; 325 MG/1; MG/1
1 TABLET ORAL EVERY 8 HOURS PRN
Qty: 10 TABLET | Refills: 0 | Status: SHIPPED | OUTPATIENT
Start: 2022-08-24 | End: 2022-08-29

## 2022-08-24 RX ORDER — INSULIN GLARGINE 100 [IU]/ML
20 INJECTION, SOLUTION SUBCUTANEOUS NIGHTLY
Qty: 6 ML | Refills: 0 | Status: SHIPPED | OUTPATIENT
Start: 2022-08-24 | End: 2022-10-05

## 2022-08-24 RX ORDER — GABAPENTIN 100 MG/1
100 CAPSULE ORAL NIGHTLY
Qty: 30 CAPSULE | Refills: 0 | Status: SHIPPED | OUTPATIENT
Start: 2022-08-24 | End: 2022-09-23

## 2022-08-24 RX ORDER — ENOXAPARIN SODIUM 100 MG/ML
30 INJECTION SUBCUTANEOUS 2 TIMES DAILY
Status: DISCONTINUED | OUTPATIENT
Start: 2022-08-25 | End: 2022-08-24 | Stop reason: HOSPADM

## 2022-08-24 RX ORDER — PEN NEEDLE, DIABETIC 31 GX5/16"
1 NEEDLE, DISPOSABLE MISCELLANEOUS DAILY
Qty: 100 EACH | Refills: 3 | Status: SHIPPED | OUTPATIENT
Start: 2022-08-24 | End: 2022-10-05

## 2022-08-24 RX ORDER — INSULIN LISPRO 100 [IU]/ML
3 INJECTION, SOLUTION INTRAVENOUS; SUBCUTANEOUS
Qty: 2.7 ML | Refills: 0 | Status: SHIPPED | OUTPATIENT
Start: 2022-08-24 | End: 2022-08-24 | Stop reason: SDUPTHER

## 2022-08-24 RX ORDER — HYDROCODONE BITARTRATE AND ACETAMINOPHEN 5; 325 MG/1; MG/1
1 TABLET ORAL EVERY 8 HOURS PRN
Qty: 10 TABLET | Refills: 0 | Status: SHIPPED | OUTPATIENT
Start: 2022-08-24 | End: 2022-08-24 | Stop reason: SDUPTHER

## 2022-08-24 RX ORDER — METRONIDAZOLE 500 MG/1
500 TABLET ORAL EVERY 8 HOURS SCHEDULED
Qty: 16 TABLET | Refills: 0 | Status: SHIPPED | OUTPATIENT
Start: 2022-08-24 | End: 2022-08-30

## 2022-08-24 RX ORDER — GABAPENTIN 100 MG/1
100 CAPSULE ORAL NIGHTLY
Qty: 30 CAPSULE | Refills: 0 | Status: SHIPPED | OUTPATIENT
Start: 2022-08-24 | End: 2022-08-24 | Stop reason: SDUPTHER

## 2022-08-24 RX ORDER — METRONIDAZOLE 500 MG/1
500 TABLET ORAL EVERY 8 HOURS SCHEDULED
Qty: 16 TABLET | Refills: 0 | Status: SHIPPED | OUTPATIENT
Start: 2022-08-24 | End: 2022-08-24 | Stop reason: SDUPTHER

## 2022-08-24 RX ORDER — INSULIN GLARGINE 100 [IU]/ML
20 INJECTION, SOLUTION SUBCUTANEOUS NIGHTLY
Qty: 6 ML | Refills: 0 | Status: SHIPPED | OUTPATIENT
Start: 2022-08-24 | End: 2022-08-24 | Stop reason: SDUPTHER

## 2022-08-24 RX ORDER — INSULIN LISPRO 100 [IU]/ML
3 INJECTION, SOLUTION INTRAVENOUS; SUBCUTANEOUS
Qty: 2.7 ML | Refills: 0 | Status: SHIPPED | OUTPATIENT
Start: 2022-08-24 | End: 2022-10-05

## 2022-08-24 RX ORDER — PEN NEEDLE, DIABETIC 31 GX5/16"
1 NEEDLE, DISPOSABLE MISCELLANEOUS DAILY
Qty: 100 EACH | Refills: 3 | Status: SHIPPED | OUTPATIENT
Start: 2022-08-24 | End: 2022-08-24 | Stop reason: SDUPTHER

## 2022-08-24 RX ORDER — GLUCOSAMINE HCL/CHONDROITIN SU 500-400 MG
CAPSULE ORAL
Qty: 150 STRIP | Refills: 2 | Status: SHIPPED | OUTPATIENT
Start: 2022-08-24 | End: 2022-08-24 | Stop reason: SDUPTHER

## 2022-08-24 RX ADMIN — CEFTRIAXONE SODIUM 2000 MG: 2 INJECTION, POWDER, FOR SOLUTION INTRAMUSCULAR; INTRAVENOUS at 18:08

## 2022-08-24 RX ADMIN — METRONIDAZOLE 500 MG: 500 TABLET ORAL at 13:42

## 2022-08-24 RX ADMIN — INSULIN LISPRO 2 UNITS: 100 INJECTION, SOLUTION INTRAVENOUS; SUBCUTANEOUS at 18:14

## 2022-08-24 RX ADMIN — ENOXAPARIN SODIUM 40 MG: 100 INJECTION SUBCUTANEOUS at 08:06

## 2022-08-24 RX ADMIN — METRONIDAZOLE 500 MG: 500 TABLET ORAL at 05:46

## 2022-08-24 RX ADMIN — SODIUM CHLORIDE, PRESERVATIVE FREE 10 ML: 5 INJECTION INTRAVENOUS at 08:07

## 2022-08-24 ASSESSMENT — ENCOUNTER SYMPTOMS
GASTROINTESTINAL NEGATIVE: 1
CHEST TIGHTNESS: 0
CONSTIPATION: 0
EYE DISCHARGE: 0
SHORTNESS OF BREATH: 0
ABDOMINAL DISTENTION: 0
COLOR CHANGE: 0
RESPIRATORY NEGATIVE: 1
PHOTOPHOBIA: 0
DIARRHEA: 0

## 2022-08-24 NOTE — PLAN OF CARE
Problem: Discharge Planning  Goal: Discharge to home or other facility with appropriate resources  8/24/2022 1840 by Idalia Escalera RN  Outcome: Completed  8/24/2022 1707 by Idalia Escalera RN  Outcome: Progressing  Flowsheets (Taken 8/24/2022 3791)  Discharge to home or other facility with appropriate resources: Identify barriers to discharge with patient and caregiver     Problem: Pain  Goal: Verbalizes/displays adequate comfort level or baseline comfort level  8/24/2022 1840 by Idalia Escalera RN  Outcome: Completed  8/24/2022 1707 by Idalia Escalera RN  Outcome: Progressing     Problem: Safety - Adult  Goal: Free from fall injury  8/24/2022 1840 by Idalia Escalera RN  Outcome: Completed  8/24/2022 1707 by Idalia Escalera RN  Outcome: Progressing     Problem: ABCDS Injury Assessment  Goal: Absence of physical injury  8/24/2022 1840 by Idalia Escalera RN  Outcome: Completed  8/24/2022 1707 by Idalia Escalera RN  Outcome: Progressing     Problem: Musculoskeletal - Adult  Goal: Return mobility to safest level of function  8/24/2022 1840 by Idalia Escalera RN  Outcome: Completed  8/24/2022 1707 by Idalia Escalera RN  Outcome: Progressing  Flowsheets (Taken 8/24/2022 0807)  Return Mobility to Safest Level of Function: Assess patient stability and activity tolerance for standing, transferring and ambulating with or without assistive devices  Goal: Return ADL status to a safe level of function  8/24/2022 1840 by Idalia Escalera RN  Outcome: Completed  8/24/2022 1707 by Idalia Escalera RN  Outcome: Progressing  Flowsheets (Taken 8/24/2022 0807)  Return ADL Status to a Safe Level of Function: Administer medication as ordered     Problem: Metabolic/Fluid and Electrolytes - Adult  Goal: Electrolytes maintained within normal limits  8/24/2022 1840 by Idalia Escalera RN  Outcome: Completed  8/24/2022 1707 by Idalia Escalera RN  Outcome: Progressing  Flowsheets (Taken 8/24/2022 2960)  Electrolytes maintained within normal limits: Monitor labs and assess patient for signs and symptoms of electrolyte imbalances  Goal: Hemodynamic stability and optimal renal function maintained  8/24/2022 1840 by Henri Ngo RN  Outcome: Completed  8/24/2022 1707 by Henri Ngo RN  Outcome: Progressing  Flowsheets (Taken 8/24/2022 0807)  Hemodynamic stability and optimal renal function maintained: Monitor labs and assess for signs and symptoms of volume excess or deficit  Goal: Glucose maintained within prescribed range  8/24/2022 1840 by Henri Ngo RN  Outcome: Completed  8/24/2022 1707 by Henri Ngo RN  Outcome: Progressing  Flowsheets (Taken 8/24/2022 0807)  Glucose maintained within prescribed range: Monitor blood glucose as ordered     Problem: Skin/Tissue Integrity - Adult  Goal: Incisions, wounds, or drain sites healing without S/S of infection  8/24/2022 1840 by Henri Ngo RN  Outcome: Completed  8/24/2022 1707 by Henri Ngo RN  Outcome: Progressing  Flowsheets (Taken 8/24/2022 0807)  Incisions, Wounds, or Drain Sites Healing Without Sign and Symptoms of Infection: TWICE DAILY: Assess and document skin integrity     Problem: Neurosensory - Adult  Goal: Achieves stable or improved neurological status  8/24/2022 1840 by Henri Ngo RN  Outcome: Completed  8/24/2022 1707 by Henri Ngo RN  Outcome: Progressing  Flowsheets (Taken 8/24/2022 0807)  Achieves stable or improved neurological status: Assess for and report changes in neurological status     Problem: Respiratory - Adult  Goal: Achieves optimal ventilation and oxygenation  8/24/2022 1840 by Henri Ngo RN  Outcome: Completed  8/24/2022 1707 by Henri Ngo RN  Outcome: Progressing     Problem: Chronic Conditions and Co-morbidities  Goal: Patient's chronic conditions and co-morbidity symptoms are monitored and maintained or improved  8/24/2022 1840 by Henri Ngo RN  Outcome: Completed  8/24/2022 1707 by Henri Ngo RN  Outcome: Progressing  Flowsheets (Taken 8/24/2022 0807)  Care Plan - Patient's Chronic Conditions and Co-Morbidity Symptoms are Monitored and Maintained or Improved: Monitor and assess patient's chronic conditions and comorbid symptoms for stability, deterioration, or improvement     Problem: Skin/Tissue Integrity  Goal: Absence of new skin breakdown  Description: 1. Monitor for areas of redness and/or skin breakdown  2. Assess vascular access sites hourly  3. Every 4-6 hours minimum:  Change oxygen saturation probe site  4. Every 4-6 hours:  If on nasal continuous positive airway pressure, respiratory therapy assess nares and determine need for appliance change or resting period.   8/24/2022 1840 by Stacie Walter RN  Outcome: Completed  8/24/2022 1707 by Stacie Walter, RN  Outcome: Progressing

## 2022-08-24 NOTE — PROGRESS NOTES
DATE: 2022    NAME: Kvng Robles  MRN: [id number]   : 1955    Patient not seen this date for Occupational Therapy due to:      [] Cancel by RN or physician due to:    [] Hemodialysis    [] Critical Lab Value Level     [] Blood transfusion in progress    [] Acute or unstable cardiovascular status   _MAP < 55 or more than >115  _HR < 40 or > 130    [] Acute or unstable pulmonary status   -FiO2 > 60%   _RR < 5 or >40    _O2 sats < 85%    [] Strict Bedrest    [] Off Unit for surgery or procedure    [] Off Unit for testing       [] Pending imaging to R/O fracture    [x] Refusal by Patient : Pt. Declined treatment d/t independent with selfcare at this time per pt. Pt. Stated \"going home today\". [] Other      [] OT being discontinued at this time. Patient independent. No further needs. [] OT being discontinued at this time as the patient has been transferred to hospice care. No further needs.       MATTHEW Blank

## 2022-08-24 NOTE — PROGRESS NOTES
Progress Note  Podiatric Medicine and Surgery     Subjective     CC: S/p amputation of the right hallux 8/19/2022, Right foot infection    Patient seen and examined at bedside. No acute events overnight. Afebrile, vital signs stable   Reports mild pain to right hallux, no further pedal complaints at this time      HPI :  Stefan Moseley is a 78 y.o. male seen at [facility] [address] for a wound on the right foot. The patient reports that he does not follow with any doctors, does not take any medications other than vitamins, and does not have any significant past medical history, including diabetes. His wife is present at bedside and they report the wound started approximately 5 weeks ago, unknown cause, and has since become progressively worse. They have been soaking the foot in apple cider vinegar but not applying anything else to the wound. Patient states he doesn't smoke or drink alcohol. He has no complaints regarding the left foot. ROS: Denies N/V/F/C/SOB/CP. Otherwise negative except at stated in the HPI.      Medications:  Scheduled Meds:   insulin glargine  25 Units SubCUTAneous Nightly    gabapentin  100 mg Oral Nightly    insulin lispro  0-8 Units SubCUTAneous TID WC    insulin lispro  0-4 Units SubCUTAneous Nightly    cefTRIAXone (ROCEPHIN) IV  2,000 mg IntraVENous Q24H    metroNIDAZOLE  500 mg Oral 3 times per day    sodium chloride flush  5-40 mL IntraVENous 2 times per day    enoxaparin  40 mg SubCUTAneous Daily       Continuous Infusions:   dextrose      sodium chloride Stopped (08/22/22 2304)       PRN Meds:glucose, dextrose bolus **OR** dextrose bolus, glucagon (rDNA), dextrose, sodium chloride flush, sodium chloride, potassium chloride **OR** potassium alternative oral replacement **OR** potassium chloride, magnesium sulfate, ondansetron **OR** ondansetron, polyethylene glycol, acetaminophen **OR** acetaminophen, HYDROcodone 5 mg - acetaminophen **OR** HYDROcodone 5 mg - acetaminophen    Objective Vitals:  Patient Vitals for the past 8 hrs:   BP Temp Temp src Pulse Resp SpO2 Weight   22 0752 125/83 97.7 °F (36.5 °C) Oral 82 16 95 % --   22 0426 -- -- -- -- -- -- 235 lb 3.2 oz (106.7 kg)     Average, Min, and Max for last 24 hours Vitals:  TEMPERATURE:  Temp  Av.1 °F (36.7 °C)  Min: 97.7 °F (36.5 °C)  Max: 98.6 °F (37 °C)    RESPIRATIONS RANGE: Resp  Av  Min: 16  Max: 16    PULSE RANGE: Pulse  Av.4  Min: 78  Max: 93    BLOOD PRESSURE RANGE:  Systolic (03RPU), WNX:237 , Min:100 , OAF:850   ; Diastolic (32CPZ), UEB:09, Min:52, Max:83      PULSE OXIMETRY RANGE: SpO2  Av.4 %  Min: 92 %  Max: 97 %    I/O last 3 completed shifts: In: 1467 [P.O.:480; I.V.:165.3; IV Piggyback:821.7]  Out: 1250 [Urine:1250]    CBC:  Recent Labs     22  0533   WBC 9.5   HGB 14.8   HCT 44.9           BMP:  Recent Labs     22  0533      K 4.0      CO2 26   BUN 13   CREATININE 1.13   GLUCOSE 208*   CALCIUM 8.8        Coags:  No results for input(s): APTT, PROT, INR in the last 72 hours. Lab Results   Component Value Date    SEDRATE 81 (H) 2022     No results for input(s): CRP in the last 72 hours. Lower Extremity Physical Exam:   Vascular: DP and PT pulses are Palpable . CFT <3 seconds to all digits. Hair growth is absent to the level of the digits. Moderate non-pitting edema to the right lower extremity. Neuro: Saph/sural/SP/DP/plantar sensation diminished to light touch. Musculoskeletal: Muscle strength is adequate ROM, adequate strength to all lower extremity muscle groups. Gross deformity is absent. Compartments compressible. Dermatologic: Iatrogenic wound noted to the amputation site of the hallux which was left open for drainage. No purulence noted today, skin edges appear healthy without necrotic tissue. Erythema significantly improved since yesterday.     Clinical Images:          Imaging:   XR FOOT RIGHT (MIN 3 VIEWS)   Final Result Interval performance of amputation of the great toe at the level of the   metatarsal bone with tissue swelling and probable packing material long the   amputation site. MRI FOOT RIGHT W WO CONTRAST   Final Result   1. Soft tissue ulceration at the great toe with underlying complex rim   enhancing collection measuring 2.3 x 2.8 x 3.6 cm most compatible with   abscess. 2. Osteomyelitis of the proximal and distal phalanges of the right great toe. 3. Diffuse soft tissue edema with associated soft tissue enhancement   consistent with cellulitis. Edema is most pronounced at the soft tissues of   the great toe. XR FOOT RIGHT (MIN 3 VIEWS)   Final Result   Mildly heterogeneous attenuation of the 1st distal phalanx and osteomyelitis   is a consideration. Diffuse soft tissue swelling with soft tissue laceration or defect of the 1st   digit. IR ULTRASOUND GUIDANCE VASCULAR ACCESS    (Results Pending)       Cultures:   Culture, Tissue [[id number]] (Abnormal) Collected: 08/19/22 6087   Order Status: Completed Specimen: Tissue from Foot Updated: 08/21/22 1132    Specimen Description . TOE RT GREAT    Direct Exam RARE NEUTROPHILS Abnormal      MODERATE GRAM POSITIVE COCCI IN CLUSTERS Abnormal     Culture GRAM NEGATIVE RODS LIGHT GROWTH Abnormal      NORMAL SKIN [place]       Assessment   Robin hCeung is a 78 y.o. male with   Status post amputation of right hallux   diabetic foot ulcer to the level of tendon; right hallux  Osteomyelitis of right hallux  Cellulitis; RLE  Undiagnosed DM2 secondary to peripheral neuropathy    Principal Problem:    Acute osteomyelitis of toe of right foot ([facility])  Active Problems:    Hyperglycemia    Cellulitis    New onset type 2 diabetes mellitus ([address]    Diabetic foot infection ([address]    Osteomyelitis of great toe of right foot ([address]    Diabetic infection of right foot ([address]  Resolved Problems:    * No resolved hospital problems.  *       Plan     Patient examined and evaluated at bedside. Continue medical management per Internal Medicine. Abx per infectious disease: IV rocephin and PO metronidazole, appreciate recommendations for discharge  Dressings intact to right foot: adaptic, 4x4 gauze, Kerlix, Ace  Patient is stable for discharge on podiatry standpoint, will follow outpatient  Dressings to remain clean, dry, intact until outpatient follow up with Dr. Emmanuel Closs to heel of operative foot. Patient care discussed with  Dr. Manohar Wilhelm.     Sydney Bliss DPM   Podiatric Medicine & Surgery   8/24/2022 at 8:55 AM

## 2022-08-24 NOTE — PROGRESS NOTES
[facility]  Progress Note    King Sakshi  MRN: [id number]  Date: 8/24/2022  LOS: 6     Reason for F/U :   Diabetic foot ulcer    Impression :   Right great toe diabetic foot ulcer with associated infection, osteomyelitis and abscess  Status post right foot hallux amputation 8/19/2022  Right great toe tissue culture with E. coli and Bacteroides, 8/19/2022  Diabetes mellitus type 2 with associated peripheral neuropathy    Recommendations: The patient was initially on IV vancomycin and Zosyn  He is now on ceftriaxone and oral metronidazole, started 8/22/2022  The patient underwent right great toe amp site closure at the bedside 8/23/2022  The plan is to continue IV ceftriaxone through 9/2/2022  Midline has been ordered    Infection Control Recommendations:   Universal precautions    Discharge Planning:   Estimated Length of IV antimicrobials: 9/2/2022  Patient will need Midline Catheter insertion  Patient will need: Home IV  Patient willneed outpatient wound care: Yes    Medical Decision making / Summary of Stay:   King Sakshi is a 78y.o.-year-old male who was initially admitted on 8/18/2022. Ericka Durán is seen and evaluated at bedside and his wife was in the room at the time of my evaluation. He reports that he developed a wound on his right great toe about 5 weeks ago that he had not sought any medical treatment for. He then reports that he was walking for quite some time and visiting with his grandkids and since that time his wound has become progressively worse with soft tissue swelling and he reports that since he has been admitted he has had some fevers and chills but did not have any at home. Patient has since been admitted seen by the podiatry service and started on broad-spectrum antimicrobial therapy. Work-up included MRI imaging that has shown osteomyelitis and the patient is scheduled for surgery later today. I was asked to evaluate and help with antibiotic choice. Current evaluation:2022    /83   Pulse 82   Temp 97.7 °F (36.5 °C) (Oral)   Resp 16   Ht 6' 2\" (1.88 m)   Wt 235 lb 3.2 oz (106.7 kg)   SpO2 95%   BMI 30.20 kg/m²     Temperature Range: Temp: 97.7 °F (36.5 °C) Temp  Av.1 °F (36.7 °C)  Min: 97.7 °F (36.5 °C)  Max: 98.6 °F (37 °C)    The patient is lying in bed  Spouse is at bedside  He denies any pain  No fevers or chills    Review of Systems   Constitutional: Negative. HENT: Negative. Respiratory: Negative. Cardiovascular: Negative. Gastrointestinal: Negative. Genitourinary: Negative. Musculoskeletal: Negative. Neurological: Negative. Psychiatric/Behavioral: Negative. Physical Examination :     Physical Exam  Constitutional:       Appearance: Normal appearance. He is normal weight. HENT:      Head: Normocephalic and atraumatic. Pulmonary:      Effort: Pulmonary effort is normal. No respiratory distress. Musculoskeletal:         General: Normal range of motion. Comments: Right foot dressing in place, not removed   Skin:     General: Skin is warm and dry. Neurological:      General: No focal deficit present. Mental Status: He is alert and oriented to person, place, and time. Mental status is at baseline. Psychiatric:         Mood and Affect: Mood normal.         Behavior: Behavior normal.         Thought Content: Thought content normal.       Laboratory data:   I have independently reviewed the followinglabs:  CBC with Differential:   Recent Labs     22  0533   WBC 9.5   HGB 14.8   HCT 44.9      LYMPHOPCT 18*   MONOPCT 10       BMP:   Recent Labs     22  0533      K 4.0      CO2 26   BUN 13   CREATININE 1.13       Hepatic Function Panel: No results for input(s): PROT, LABALBU, BILIDIR, IBILI, BILITOT, ALKPHOS, ALT, AST in the last 72 hours.       No results found for: PROCAL  Lab Results   Component Value Date/Time    .6 2022 11:35 AM     Lab Results   Component Value Date    SEDRATE 81 (H) 08/18/2022         No results found for: DDIMER  No results found for: FERRITIN  No results found for: LDH  No results found for: FIBRINOGEN    No results found for requested labs within last 30 days. No results found for: COVID19    No results for input(s): VANCOTROUGH in the last 72 hours. Imaging Studies:   No new imaging  Cultures:     Component 8/19/22 1438    Specimen Description . TOE RT GREAT P    Direct Exam RARE NEUTROPHILS Abnormal  P    Direct Exam MODERATE GRAM POSITIVE COCCI IN CLUSTERS Abnormal  P    Culture ESCHERICHIA COLI LIGHT GROWTH Abnormal  P    Culture NORMAL SKIN [place] P    Culture BACTEROIDES FRAGILIS MODERATE GROWTH BETA LACTAMASE POSITIVE Abnormal  P    Resulting Agency [facility]        Susceptibility    Escherichia coli (1)    Antibiotic Interpretation Microscan Method Status    ampicillin Sensitive 4 BACTERIAL SUSCEPTIBILITY PANEL DANIELLE Preliminary    aztreonam Sensitive <=1 BACTERIAL SUSCEPTIBILITY PANEL DANIELLE Preliminary    ceFAZolin Sensitive <=4 BACTERIAL SUSCEPTIBILITY PANEL DANIELLE Preliminary    cefTRIAXone Sensitive <=1 BACTERIAL SUSCEPTIBILITY PANEL DANIELLE Preliminary    ciprofloxacin Sensitive <=0.25 BACTERIAL SUSCEPTIBILITY PANEL DANIELLE Preliminary    Confirmatory Extended Spectrum Beta-Lactamase Negative NEGATIVE BACTERIAL SUSCEPTIBILITY PANEL DANIELLE Preliminary    gentamicin Sensitive <=1 BACTERIAL SUSCEPTIBILITY PANEL DANIELLE Preliminary    tobramycin Sensitive <=1 BACTERIAL SUSCEPTIBILITY PANEL DANIELLE Preliminary    trimethoprim-sulfamethoxazole Sensitive <=20 BACTERIAL SUSCEPTIBILITY PANEL DANIELLE Preliminary    piperacillin-tazobactam Sensitive <=4 BACTERIAL SUSCEPTIBILITY PANEL DANIELLE Preliminary          Specimen Collected: 08/19/22 14:38 EDT Last Resulted: 08/22/22 13:27 EDT           Culture, Blood 1 [[id number]] Collected: 08/18/22 1135   Order Status: Completed Specimen: Blood Updated: 08/23/22 1404    Specimen Description Cassi Fajardo BLOOD    Special Requests LEFT AC,12ML    Culture NO GROWTH 5 DAYS   Culture, Blood 1 [[id number]] Collected: 08/18/22 1130   Order Status: Completed Specimen: Blood Updated: 08/23/22 1403    Specimen Description . BLOOD    Special Requests RFA,12ML    Culture NO GROWTH 5 DAYS   Culture, Tissue [[id number]] (Abnormal)  Collected: 08/19/22 1438     Medications:      insulin glargine  25 Units SubCUTAneous Nightly    gabapentin  100 mg Oral Nightly    insulin lispro  0-8 Units SubCUTAneous TID WC    insulin lispro  0-4 Units SubCUTAneous Nightly    cefTRIAXone (ROCEPHIN) IV  2,000 mg IntraVENous Q24H    metroNIDAZOLE  500 mg Oral 3 times per day    sodium chloride flush  5-40 mL IntraVENous 2 times per day    enoxaparin  40 mg SubCUTAneous Daily           [facility]  WALTER Cantrell CNP  Perfect [employer] messaging  OFFICE: [phone]      Electronically signed by WALTER Cantrell CNP on 8/24/2022 at 8:26 AM  Thank you for allowing us to participate in the care of this patient. Please call with questions. This note iscreated with the assistance of a speech recognition program.  While intending to generate a document that actually reflects the content of the visit, the document can still have some errors including those of syntax andsound a like substitutions which may escape proof reading. In such instances, actual meaning can be extrapolated by contextual diversion.

## 2022-08-24 NOTE — PROGRESS NOTES
Physical Therapy  DATE: 2022    NAME: Garcia Johnston  MRN: [id number]   : 1955    Patient not seen this date for Physical Therapy due to:      [] Cancel by RN or physician due to:    [] Hemodialysis    [] Critical Lab Value Level     [] Blood transfusion in progress    [] Acute or unstable cardiovascular status   _MAP < 55 or more than >115  _HR < 40 or > 130    [] Acute or unstable pulmonary status   -FiO2 > 60%   _RR < 5 or >40    _O2 sats < 85%    [] Strict Bedrest    [] Off Unit for surgery or procedure    [] Off Unit for testing       [] Pending imaging to R/O fracture    [x] Refusal by Patient  Pt. Declined treatment d/t independent with ambulation at this time per pt. Pt. Stated \"going home today\". [] Other      [] PT being discontinued at this time. Patient independent. No further needs. [] PT being discontinued at this time as the patient has been transferred to hospice care. No further needs.       JANES CARLSON, PTA

## 2022-08-24 NOTE — PROGRESS NOTES
[facility]  Office: [address], DO, Baylee Ruff, DO, Lisseth Priest, DO, Rufino Larry Sophia, DO, Tommy Wilson MD, Inge Roman MD, Kennis Osler, MD, Agata Marti MD,  Fausto Jenkins MD, Gita Walton MD, Gopal Matamoros, DO, Kathleen Ordonez MD,  Larry Thomas MD, Margoth Camp MD, Mary Patel DO, Quentin Head MD, Roxy Diaz MD, Clementina Parisi MD, Gee Bailey, DO, Tierra Santizo MD, Valentín Dover MD, Brittany Montano CNP,  Melanie Martell CNP, Javi Ramos CNP, Suleman Shah CNP, Keshia Benton PA-C, Nik Ward [facility], Loren Lazaro, CNP, Noni Small, CNP, Raj Paula, CNP, Cece Caceres, CNP, John Paul Sanchez, CNP, Sue Parmar, CNS, Stephanie Donovan [facility], Gurpreet Moore, CNP, Alison Wagner, CNP, Isabel Akbar, [facility]    Progress Note    8/24/2022    9:59 AM    Name:   Julian Watts  MRN:     [id number]     Acct:      [de-identified]   Room:   2014/2014-02  IP Day:  6  Admit Date:  8/18/2022 10:54 AM    PCP:   No primary care provider on file. Code Status:  Full Code    Subjective:     C/C:   Chief Complaint   Patient presents with    Wound Infection     Right foot infection     Interval History Status: not changed. Pateint mark nd examined at bedside. Wife also at bedside. Working on finding a PCP. Su. Wants to go home. Overall stable. Brief History:     52-year-old male past medical history of poorly controlled diabetes type 2, peripheral neuropathy, found to have diabetic ulcer of the right foot as well as osteomyelitis status post right hallux amputation 8/19/2022 currently being followed by infectious disease and podiatry. Patient currently on Rocephin and metronidazole with plan for formal closure possibly later this week. Plan for Rocephin till 9/2/2022.     Review of Systems:     Review of Systems   Constitutional:  Negative for activity change, Known Problems Father        Vitals:  /83   Pulse 82   Temp 97.7 °F (36.5 °C) (Oral)   Resp 16   Ht 6' 2\" (1.88 m)   Wt 235 lb 3.2 oz (106.7 kg)   SpO2 95%   BMI 30.20 kg/m²   Temp (24hrs), Av.1 °F (36.7 °C), Min:97.7 °F (36.5 °C), Max:98.6 °F (37 °C)    Recent Labs     22  1226 22  1613 22  0615   POCGLU 210* 232* 200* 172*         I/O (24Hr): Intake/Output Summary (Last 24 hours) at 2022 0959  Last data filed at 2022 0756  Gross per 24 hour   Intake --   Output 1150 ml   Net -1150 ml         Labs:  Hematology:  Recent Labs     22  0533   WBC 9.5   RBC 4.83   HGB 14.8   HCT 44.9   MCV 93.0   MCH 30.6   MCHC 33.0   RDW 12.1      MPV 9.3       Chemistry:  Recent Labs     22  0533      K 4.0      CO2 26   GLUCOSE 208*   BUN 13   CREATININE 1.13   ANIONGAP 8*   LABGLOM >60   GFRAA >60   CALCIUM 8.8       Recent Labs     22  0555 22  1226 22  1613 22  0615   POCGLU 247* 206* 210* 232* 200* 172*       ABG:No results found for: POCPH, PHART, PH, POCPCO2, BTD3BBA, PCO2, POCPO2, PO2ART, PO2, POCHCO3, VRK3KGI, HCO3, NBEA, PBEA, BEART, BE, THGBART, THB, TLX3JYT, MIWH5KZK, T6LTMZDV, O2SAT, FIO2  Lab Results   Component Value Date/Time    SPECIAL LEFT AC,12ML 2022 11:35 AM     Lab Results   Component Value Date/Time    CULTURE ESCHERICHIA COLI LIGHT GROWTH (A) 2022 02:38 PM    CULTURE NORMAL SKIN [place] 2022 02:38 PM    CULTURE (A) 2022 02:38 PM     BACTEROIDES FRAGILIS MODERATE GROWTH BETA LACTAMASE POSITIVE       Radiology:  XR FOOT RIGHT (MIN 3 VIEWS)    Result Date: 2022  Interval performance of amputation of the great toe at the level of the metatarsal bone with tissue swelling and probable packing material long the amputation site.      XR FOOT RIGHT (MIN 3 VIEWS)    Result Date: 2022  Mildly heterogeneous attenuation of the 1st distal phalanx and osteomyelitis is a consideration. Diffuse soft tissue swelling with soft tissue laceration or defect of the 1st digit. MRI FOOT RIGHT W WO CONTRAST    Result Date: 8/18/2022  1. Soft tissue ulceration at the great toe with underlying complex rim enhancing collection measuring 2.3 x 2.8 x 3.6 cm most compatible with abscess. 2. Osteomyelitis of the proximal and distal phalanges of the right great toe. 3. Diffuse soft tissue edema with associated soft tissue enhancement consistent with cellulitis. Edema is most pronounced at the soft tissues of the great toe. Physical Examination:        Physical Exam  Constitutional:       General: He is not in acute distress. Appearance: He is not ill-appearing. HENT:      Head: Normocephalic and atraumatic. Right Ear: External ear normal.      Left Ear: External ear normal.      Nose: Nose normal.      Mouth/Throat:      Mouth: Mucous membranes are moist.   Eyes:      Pupils: Pupils are equal, round, and reactive to light. Cardiovascular:      Rate and Rhythm: Normal rate and regular rhythm. Heart sounds: No murmur heard. Pulmonary:      Effort: Pulmonary effort is normal.      Breath sounds: Normal breath sounds. No stridor. Abdominal:      General: Abdomen is flat. There is no distension. Palpations: There is no mass. Musculoskeletal:         General: No swelling. Cervical back: Neck supple. Comments: Right foot wrapped, no gross bleeding on dressing   Skin:     General: Skin is warm and dry. Capillary Refill: Capillary refill takes less than 2 seconds. Neurological:      General: No focal deficit present. Mental Status: He is alert and oriented to person, place, and time.    Psychiatric:         Mood and Affect: Mood normal.         Behavior: Behavior normal.       Assessment:        Hospital Problems             Last Modified POA    * (Principal) Acute osteomyelitis of toe of right foot ([address] 8/20/2022 Yes Hyperglycemia 8/18/2022 Yes    Cellulitis 8/19/2022 Yes    New onset type 2 diabetes mellitus [address] 8/23/2022 Yes    Diabetic foot infection ([facility] 8/20/2022 Yes    Osteomyelitis of great toe of right foot ([address] 8/22/2022 Yes    Diabetic infection of right foot ([facility] 8/22/2022 Yes     Plan:        Right foot wound status post right hallux amputation 8/19/2022. Appreciate podiatry recommendations. Outpatient follow up  Appreciate infectious disease recommendations. Continue Rocephin planned till 9/2/2022, continue Flagyl  Diabetes type 2. Lantus increased to 25 units nightly, high-dose sliding scale  Peripheral neuropathy we will start gabapentin nightly  Continue PT and OT, patient also ambulating in the room well with walker.       Rosalina Duff MD  8/24/2022  9:59 AM

## 2022-08-24 NOTE — FLOWSHEET NOTE
DC instructions given to patient and wife including scripts & referrals. Instructions reviewed and verbalized understanding. All belongings packed and waiting for PCT to take pt down to car.

## 2022-08-24 NOTE — PLAN OF CARE
Problem: Discharge Planning  Goal: Discharge to home or other facility with appropriate resources  Outcome: Progressing  Flowsheets (Taken 8/24/2022 0807)  Discharge to home or other facility with appropriate resources: Identify barriers to discharge with patient and caregiver     Problem: Pain  Goal: Verbalizes/displays adequate comfort level or baseline comfort level  Outcome: Progressing     Problem: Safety - Adult  Goal: Free from fall injury  Outcome: Progressing     Problem: ABCDS Injury Assessment  Goal: Absence of physical injury  Outcome: Progressing     Problem: Musculoskeletal - Adult  Goal: Return mobility to safest level of function  Outcome: Progressing  Flowsheets (Taken 8/24/2022 0807)  Return Mobility to Safest Level of Function: Assess patient stability and activity tolerance for standing, transferring and ambulating with or without assistive devices  Goal: Return ADL status to a safe level of function  Outcome: Progressing  Flowsheets (Taken 8/24/2022 0807)  Return ADL Status to a Safe Level of Function: Administer medication as ordered     Problem: Metabolic/Fluid and Electrolytes - Adult  Goal: Electrolytes maintained within normal limits  Outcome: Progressing  Flowsheets (Taken 8/24/2022 0807)  Electrolytes maintained within normal limits: Monitor labs and assess patient for signs and symptoms of electrolyte imbalances  Goal: Hemodynamic stability and optimal renal function maintained  Outcome: Progressing  Flowsheets (Taken 8/24/2022 0807)  Hemodynamic stability and optimal renal function maintained: Monitor labs and assess for signs and symptoms of volume excess or deficit  Goal: Glucose maintained within prescribed range  Outcome: Progressing  Flowsheets (Taken 8/24/2022 0807)  Glucose maintained within prescribed range: Monitor blood glucose as ordered     Problem: Skin/Tissue Integrity - Adult  Goal: Incisions, wounds, or drain sites healing without S/S of infection  Outcome:

## 2022-08-25 ENCOUNTER — HOSPITAL ENCOUNTER (OUTPATIENT)
Dept: INTERVENTIONAL RADIOLOGY/VASCULAR | Age: 67
Discharge: HOME | End: 2022-08-27
Payer: MEDICARE

## 2022-08-25 PROCEDURE — C1751 CATH, INF, PER/CENT/MIDLINE: HCPCS

## 2022-08-25 NOTE — PLAN OF CARE
Received call from family. , Humalog was not covered. Called [facility] spoke to pharmacist Mercer Lanes. Change Humalog to Novalog, did discuss about glucometer, testing supplies. They do not have Medicare part B or D on file. Mercer Lanes tells me he will try to reach out to family to get more information to see what is covered.       Electronically signed by Estelle Grady MD on 8/25/2022 at 9:15 AM

## 2022-08-25 NOTE — DISCHARGE SUMMARY
[facility]  Office: [address], DO, Kristyn Anand, DO, Steffanie Ramirez, DO, Aaron Dale Cortes, DO, Eric Smalls MD, Lakshmi Melgar MD, Britany Strong MD, Benedict Lane MD,  Afia Sellers MD, Se Vargas MD, Shabbir Vargas, DO, Susy Frias MD,  Joann Overton MD, Rachna Cronin MD, Pankaj Campos, DO, Radha Russo MD, Angi Etienne MD, Piter Alvarez MD, Kaylee Luevano DO, Mateo Miranda MD, Shavon Bingham MD, Cameron Henderson, CNP,  [place] Sosa, CNP, Nubia Matos, CNP, Mala Medellin, CNP, Jesse Sauer PA-C, Macario Gardner, [facility], Destinee Dowd, CNP, Jacque Balderas, CNP, Alvaro Whyte, CNP, Sunita Storey, CNP, Keyshawn Madden, CNP, Mckayla Simpson, CNS, Carlitos Kuhn, [facility], Yelena Zuniga, CNP, Michaela Rasmussen, CNP, Negar Wu, [facility]    Discharge Summary     Patient ID: Gloria Sommers  :     MRN: [id number]     ACCOUNT:  [de-identified]   Patient's PCP: No primary care provider on file. Admit Date: 2022   Discharge Date: 2022     Length of Stay: 6  Code Status:  Prior  Admitting Physician: Lisseth Gooden MD  Discharge Physician: Susy Frias MD     Active Discharge Diagnoses:     Hospital Problem Lists:  Principal Problem:    Acute osteomyelitis of toe of right foot [facility])  Active Problems:    Hyperglycemia    Cellulitis    New onset type 2 diabetes mellitus ([facility]    Diabetic foot infection ([facility]    Osteomyelitis of great toe of right foot ([facility]    Diabetic infection of right foot ([facility]    Osteomyelitis of vertebra, sacral and sacrococcygeal region [facility])  Resolved Problems:    * No resolved hospital problems.  *      Admission Condition:  stable     Discharged Condition: stable    Hospital Stay:     Hospital Course:  Gloria Sommers is a 78 y.o. male who was admitted for the management of  Acute osteomyelitis of toe of right foot ([address] , presented to ER with Wound Infection (Right foot infection)      79-year-old male past medical history of poorly controlled diabetes type 2, peripheral neuropathy, found to have diabetic ulcer of the right foot as well as osteomyelitis status post right hallux amputation 8/19/2022 currently being followed by infectious disease and podiatry. Patient currently on Rocephin and metronidazole with plan for formal closure as outpateint. Plan for Rocephin till 9/2/2022. Significant therapeutic interventions: see above    Significant Diagnostic Studies:   Labs / Micro:  CBC:   Lab Results   Component Value Date/Time    WBC 9.5 08/23/2022 05:33 AM    RBC 4.83 08/23/2022 05:33 AM    HGB 14.8 08/23/2022 05:33 AM    HCT 44.9 08/23/2022 05:33 AM    MCV 93.0 08/23/2022 05:33 AM    MCH 30.6 08/23/2022 05:33 AM    MCHC 33.0 08/23/2022 05:33 AM    RDW 12.1 08/23/2022 05:33 AM     08/23/2022 05:33 AM     BMP:    Lab Results   Component Value Date/Time    GLUCOSE 208 08/23/2022 05:33 AM     08/23/2022 05:33 AM    K 4.0 08/23/2022 05:33 AM     08/23/2022 05:33 AM    CO2 26 08/23/2022 05:33 AM    ANIONGAP 8 08/23/2022 05:33 AM    BUN 13 08/23/2022 05:33 AM    CREATININE 1.13 08/23/2022 05:33 AM    BUNCRER 12 08/23/2022 05:33 AM    CALCIUM 8.8 08/23/2022 05:33 AM    LABGLOM >60 08/23/2022 05:33 AM    GFRAA >60 08/23/2022 05:33 AM    GFR      08/23/2022 05:33 AM        Radiology:  XR FOOT RIGHT (MIN 3 VIEWS)    Result Date: 8/19/2022  Interval performance of amputation of the great toe at the level of the metatarsal bone with tissue swelling and probable packing material long the amputation site. XR FOOT RIGHT (MIN 3 VIEWS)    Result Date: 8/18/2022  Mildly heterogeneous attenuation of the 1st distal phalanx and osteomyelitis is a consideration. Diffuse soft tissue swelling with soft tissue laceration or defect of the 1st digit. MRI FOOT RIGHT W WO CONTRAST    Result Date: 8/18/2022  1.  Soft tissue ulceration at the great toe with underlying complex rim enhancing collection measuring 2.3 x 2.8 x 3.6 cm most compatible with abscess. 2. Osteomyelitis of the proximal and distal phalanges of the right great toe. 3. Diffuse soft tissue edema with associated soft tissue enhancement consistent with cellulitis. Edema is most pronounced at the soft tissues of the great toe. Consultations:    Consults:     Final Specialist Recommendations/Findings:   IP CONSULT TO INTERNAL MEDICINE  PHARMACY TO DOSE VANCOMYCIN  IP CONSULT TO INFECTIOUS DISEASES  PHARMACY TO DOSE VANCOMYCIN  IP CONSULT TO INFECTIOUS DISEASES  IP CONSULT TO DIETITIAN  IP CONSULT TO IV TEAM      The patient was seen and examined on day of discharge and this discharge summary is in conjunction with any daily progress note from day of discharge. Discharge plan:     Disposition: Home    Physician Follow Up:     Diabetes Education Program  Diabetes Education Program - [address] [facility] [address] [facility], [address] [facility]  [phone]  Follow up  They will call to set up appointments. for diabetic education    [address] [facility]  [address] [place] [address] [phone]  Follow up  [facility], [address] [place] [address] [address] [place] [address]  [phone]    Schedule an appointment as soon as possible for a visit in 1 week(s)  For wound re-check       Requiring Further Evaluation/Follow Up POST HOSPITALIZATION/Incidental Findings: follow up PCP, diabetes education, follow up podiatry    Diet: diabetic diet    Activity: As tolerated    Instructions to Patient: follow up diabetes education, follow up PCP follow up podiatry follow up infectious disease    Discharge Medications:      Medication List        START taking these medications      blood glucose test strips  Test 4 times a day & as needed for symptoms of irregular blood glucose.  Dispense sufficient amount for indicated testing frequency plus additional to accommodate PRN testing needs. cefTRIAXone  infusion  Commonly known as: ROCEPHIN  Infuse 2,000 mg intravenously every 24 hours for 10 days Through 9/2/22 compound per protocol     gabapentin 100 MG capsule  Commonly known as: NEURONTIN  Take 1 capsule by mouth nightly for 30 days. HYDROcodone-acetaminophen 5-325 MG per tablet  Commonly known as: NORCO  Take 1 tablet by mouth every 8 hours as needed for Pain for up to 5 days. insulin lispro (1 Unit Dial) 100 UNIT/ML Sopn  Commonly known as: HumaLOG KwikPen  Inject 3 Units into the skin 3 times daily (before meals)     [employer] Pen Needles 31G 31G X 8 MM Misc  Generic drug: Insulin Pen Needle  1 each by Does not apply route daily     Lancets Misc  1 each by Does not apply route 4 times daily     Lantus SoloStar 100 UNIT/ML injection pen  Generic drug: insulin glargine  Inject 20 Units into the skin nightly     metroNIDAZOLE 500 MG tablet  Commonly known as: FLAGYL  Take 1 tablet by mouth every 8 hours for 16 doses            CONTINUE taking these medications      GLUCOSAMINE CHONDR COMPLEX PO     MAG GLYCINATE PO     VITAMIN D-VITAMIN K PO     ZINC PO            STOP taking these medications      CINNAMON PO     TART CHERRY PO               Where to Get Your Medications        These medications were sent to [address] [place] [address] [address]  [facility] [address]      Hours: 24-hours Phone: [phone]   blood glucose test strips  gabapentin 100 MG capsule  HYDROcodone-acetaminophen 5-325 MG per tablet  insulin lispro (1 Unit Dial) 100 UNIT/ML Sopn  [employer] Pen Needles 31G 31G X 8 MM Misc  Lancets Misc  Lantus SoloStar 100 UNIT/ML injection pen  metroNIDAZOLE 500 MG tablet       You can get these medications from any pharmacy    Bring a paper prescription for each of these medications  cefTRIAXone  infusion         Discharge Procedure Orders   Basic Metabolic Panel   Standing Status: Future Standing Exp. Date: 08/24/23     [facility]) Medication Mgmt (Diabetes - Clinical Pharmacy) - [facility]   Referral Priority: Routine Referral Type: Consult for Advice and Opinion   Referral Reason: Specialty Services Required   Requested Specialty: Pharmacist   Number of Visits Requested: 1 Expiration Date: 08/20/24     [facility] Diabetes Education - [facility]   Referral Priority: Routine Referral Type: Eval and Treat   Referral Reason: Specialty Services Required   Number of Visits Requested: 1     DME Order for Glucometer as OP   Order Comments: You must complete the order parameters below and add the medical necessity documentation for this DME in a separate note. Home blood glucose monitor    Diagnosis: New onset diabetes    Testing frequency: Check blood sugars and record 4 times daily    Duration: purchase       Time Spent on discharge is  20 mins in patient examination, evaluation, counseling as well as medication reconciliation, prescriptions for required medications, discharge plan and follow up.     Electronically signed by   Tahmina Campos MD  8/25/2022

## 2022-08-30 ENCOUNTER — HOSPITAL ENCOUNTER (OUTPATIENT)
Dept: PHARMACY | Age: 67
Setting detail: THERAPIES SERIES
Discharge: HOME | End: 2022-08-30
Payer: MEDICARE

## 2022-08-30 PROCEDURE — 99212 OFFICE O/P EST SF 10 MIN: CPT

## 2022-08-30 RX ORDER — FLASH GLUCOSE SENSOR
1 KIT MISCELLANEOUS
Qty: 2 EACH | Refills: 1 | Status: SHIPPED | OUTPATIENT
Start: 2022-08-30 | End: 2022-09-14

## 2022-08-30 RX ORDER — FLASH GLUCOSE SCANNING READER
1 EACH MISCELLANEOUS DAILY
Qty: 1 EACH | Refills: 0 | Status: SHIPPED | OUTPATIENT
Start: 2022-08-30 | End: 2022-09-14

## 2022-08-30 NOTE — PROGRESS NOTES
Diabetic Medication Management Program  [facility] - INPATIENT  [address] [facility]. [place], [address] [facility]  Phone: [phone]  Fax: [phone]    NAME: Neda Moreland  MEDICAL RECORD NUMBER:  [id number]  AGE: 78 y.o. GENDER: male  : 1955  EPISODE DATE:  2022     Mr. Frankey Parson was referred to [facility] Management for Diabetic Education by Dr. Auther Kussmaul. SUBJECTIVE     Mr. Frankey Parson is a 78 y.o. male here for the Diabetes Service for self-management education. Recommendations will be communicated to the referring physician and PCP. Patient present for Education Session 1    Patient Findings:   Estimated date of diagnosis:  22 when he underwent toe amputation  (Session 1 Only) - Patient has had comprehensive Diabetes Education in the past: No    Symptoms of hypoglycemia  no - none   Symptoms of hyperglycemia no - none     Other Comments:  patient did not follow with PCP for quite a few years. He recently underwent toe amputation  , A1c was checked and he was diagnosed with T2DM. He also has peripheral neuropathy as noted in chart. At [facility] DR RADHA SANDERSON, he was started on Lantus 20 units daily and Novolog 3 units TID. He states he will be seeing new PCP Dr. Dorrene Hodgkin at the [facility] in [facility] on Thursday. I will reach out to her to obtain medication management referral after visit if patient wishes. OBJECTIVE     PMHx:  No past medical history on file.     Social History:    Social History     Tobacco Use    Smoking status: Never    Smokeless tobacco: Never   Substance Use Topics    Alcohol use: Not on file     Comment: rarely       Pertinent Labs:    Lab Results   Component Value Date    LABA1C 12.7 (H) 2022     No results found for: CHOL, TRIG, HDL, LDLCALC  Lab Results   Component Value Date    CREATININE 1.13 2022    BUN 13 2022     2022    K 4.0 2022     2022    CO2 26 2022     No results found for: ALT    Weight:  Wt Readings from Last 3 Encounters:   08/24/22 235 lb 3.2 oz (106.7 kg)       Blood Pressure:  BP Readings from Last 3 Encounters:   08/24/22 123/80       Current medications:    Current Outpatient Medications:     gabapentin (NEURONTIN) 100 MG capsule, Take 1 capsule by mouth nightly for 30 days. , Disp: 30 capsule, Rfl: 0    metroNIDAZOLE (FLAGYL) 500 MG tablet, Take 1 tablet by mouth every 8 hours for 16 doses, Disp: 16 tablet, Rfl: 0    blood glucose monitor strips, Test 4 times a day & as needed for symptoms of irregular blood glucose. Dispense sufficient amount for indicated testing frequency plus additional to accommodate PRN testing needs. , Disp: 150 strip, Rfl: 2    cefTRIAXone (ROCEPHIN) infusion, Infuse 2,000 mg intravenously every 24 hours for 10 days Through 9/2/22 compound per protocol, Disp: 20 g, Rfl: 0    insulin glargine (LANTUS SOLOSTAR) 100 UNIT/ML injection pen, Inject 20 Units into the skin nightly, Disp: 6 mL, Rfl: 0    insulin lispro, 1 Unit Dial, (HUMALOG KWIKPEN) 100 UNIT/ML SOPN, Inject 3 Units into the skin 3 times daily (before meals), Disp: 2.7 mL, Rfl: 0    Insulin Pen Needle ([employer] PEN NEEDLES 31G) 31G X 8 MM MISC, 1 each by Does not apply route daily, Disp: 100 each, Rfl: 3    Lancets MISC, 1 each by Does not apply route 4 times daily, Disp: 200 each, Rfl: 0    Magnesium Bisglycinate (MAG GLYCINATE PO), Take 2 tablets by mouth Daily, Disp: , Rfl:     ZINC PO, Take 1 tablet by mouth Daily, Disp: , Rfl:     Glucosamine-Chondroitin (GLUCOSAMINE CHONDR COMPLEX PO), Take 2 tablets by mouth Daily, Disp: , Rfl:     VITAMIN D-VITAMIN K PO, Take 1 tablet by mouth Daily, Disp: , Rfl:     Immunizations: There is no immunization history on file for this patient.     Recent A1c:     Lab Results   Component Value Date    LABA1C 12.7 (H) 08/18/2022        Home Blood Glucose Results: See below      Blood glucose trends noted:  BG greatly improving since starting dietary modifications and insulin. Fasting BG was close to goal on 2 mornings last week. BG does rise throughout the day - discussed reasons for this with patient. He is aware to keep carbs lower in the evening vs the morning to hopefully achieve dinner/bedtime BG closer to goal.     ASSESSMENT     Completed Journey for Control Session 1: \"Let's Talk About Managing Your Diabetes\". Interactive discussion occurred regarding topics which include: Understanding Diabetes, The Emotional Journey, Having the Infomration You Need, Blood Glucose, Monitoring, Managing Diabetes with Healthy Eating, Managing Diabetes by Keeping Active, Managing Diabetes with Medicines, and Going for your Goal and Your Support Network (left off about prison through - around monitoring BG)    Eating patterns:  generally drinking low carb beverages - mainly water. He occasionally has [place] D and a small glass of milk. His breakfast has been ham/egg/applesauce. Lunch is usually a turkey and cheese sandwich on  1 piece of rye. Occasionally he has peaches or pudding with his lunch. Dinner is meat and vegetables. He has stopped eating potatoes, high carb snacks, and pop    Exercise patterns: recovering from surgery, wife states he is still very active but should be taking it easy.       Blood Glucose Testing: Patient tests 4 times per day     Diabetic Regimen/Compliance: Compliant with regimen     Other Medication Compliance: Did not assess at today's visit    PLAN     Initial diabetic education materials given to patient including the following:*Blood Glucose Log,*Living Well With Diabetes,            *Using the Plate Method for a Balanced Meal Plan,            *Things to Know About Hypoglycemia,            *Smart Snacks,            *A1c and Average Glucose Chart,            *Alcohol and Diabetes,            *Preventative Care Resources: Eye Care, Podiatrists, Dentists, [address] [place] [address]    Patient highly motivated to continue low carb diet and exercise as appropriate. He desires CGM so I sent Shakir Doyle to his pharmacy. He is aware to let me know if there are any insurance issues. Continue current insulin therapy.        Physician Follow-up:  As scheduled    Medication Management Follow-up:   Diabetes Service  9/14/22 to finish Session 1    Electronically signed by Re Fuller [facility] on 8/30/2022 at 8:28 AM    For Pharmacy Admin Tracking Only    Intervention Detail: New Rx: 1, reason: Patient Preference  Total # of Interventions Recommended: 1  Total # of Interventions Accepted: 1  Time Spent (min): 60

## 2022-08-31 VITALS
SYSTOLIC BLOOD PRESSURE: 113 MMHG | WEIGHT: 206.8 LBS | BODY MASS INDEX: 26.55 KG/M2 | HEART RATE: 69 BPM | DIASTOLIC BLOOD PRESSURE: 75 MMHG

## 2022-09-02 ENCOUNTER — TELEPHONE (OUTPATIENT)
Dept: INFECTIOUS DISEASES | Age: 67
End: 2022-09-02

## 2022-09-02 NOTE — TELEPHONE ENCOUNTER
Conrado Das called from [facility] and just wanted to know if pt PICC Line can be pulled after last dose of AB which is set to be today? Also stated that she received your labs that you sent she sin them and dropped off at lab, but lab informed her the samples set to long so they couldn't do. So she just wanted me to inform you that she will be redrawing them just might be a delay . Please advise question and let me know when you can.     Conrado Das # [phone]     Thanks

## 2022-09-06 ENCOUNTER — TELEPHONE (OUTPATIENT)
Dept: INFECTIOUS DISEASES | Age: 67
End: 2022-09-06

## 2022-09-06 NOTE — TELEPHONE ENCOUNTER
You approved the PICC line to be pulled but wife refused it, she wants you to look at his bloodwork from [address] on 9/2/22 first. Can you please review it, I'll let the wife know then about the line.

## 2022-09-14 ENCOUNTER — HOSPITAL ENCOUNTER (OUTPATIENT)
Dept: PHARMACY | Age: 67
Setting detail: THERAPIES SERIES
Discharge: HOME | End: 2022-09-14
Payer: MEDICARE

## 2022-09-14 VITALS
SYSTOLIC BLOOD PRESSURE: 129 MMHG | HEART RATE: 58 BPM | WEIGHT: 201.2 LBS | DIASTOLIC BLOOD PRESSURE: 82 MMHG | BODY MASS INDEX: 25.83 KG/M2

## 2022-09-14 PROCEDURE — 99211 OFF/OP EST MAY X REQ PHY/QHP: CPT

## 2022-09-14 NOTE — PROGRESS NOTES
Diabetic Medication Management Program  [address]  [address] [place] [address]. [facility], [address] [facility]  Phone: [phone]  Fax: [phone]    NAME: Neda Moreland  MEDICAL RECORD NUMBER:  [id number]  AGE: 78 y.o. GENDER: male  : 1955  EPISODE DATE:  2022     Mr. Frankey Parson was referred to [facility] for Diabetic Education by Dr. Auther Kussmaul. I faxed new PCP Dr. Ciaran Amador today to request Medication Management referral as I feel patient would benefit from getting off insulin and onto other medications such as metformin, GLP1, or SGLT2 to help decrease insulin resistance. SUBJECTIVE     Mr. Frankey Parson is a 78 y.o. male here for the Diabetes Service for self-management education. Recommendations will be communicated to the referring physician and PCP. Patient present for Education Session 2    Patient Findings:   Estimated date of diagnosis:  22  (Session 1 Only) - Patient has had comprehensive Diabetes Education in the past: No    Symptoms of hypoglycemia  no - none   Symptoms of hyperglycemia yes -  waking up one or two times at night to urinate. Prior to diabetes diagnosis he was waking up much more frequently so he is pleased with this change. Other Comments:  Patient saw new PCP on   and discussed starting metformin. This has not been prescribed yet so if we receive med management referral I plan on calling in metformin 500mg with dinner x 14 days then increasing to 500mg BID. I feel that patient should stop Novolog after starting metformin as BG is very close to goal ranges and Novolog dose is very low. If he needs an additional agent once metformin is optimized GLP1 or SGLT2 would be great options. For now he will continue Lantus 20 units nightly and Novolog 3 units TID with meals. OBJECTIVE     PMHx:  No past medical history on file.     Social History:    Social History     Tobacco Use    Smoking status: Never    Smokeless tobacco: Never Substance Use Topics    Alcohol use: Not on file     Comment: rarely       Pertinent Labs:    Lab Results   Component Value Date    LABA1C 12.7 (H) 08/18/2022     No results found for: CHOL, TRIG, HDL, LDLCALC  Lab Results   Component Value Date    CREATININE 1.13 08/23/2022    BUN 13 08/23/2022     08/23/2022    K 4.0 08/23/2022     08/23/2022    CO2 26 08/23/2022     No results found for: ALT    Weight:  Wt Readings from Last 3 Encounters:   08/31/22 206 lb 12.8 oz (93.8 kg)   08/24/22 235 lb 3.2 oz (106.7 kg)       Blood Pressure:  BP Readings from Last 3 Encounters:   08/31/22 113/75   08/24/22 123/80       Current medications:    Current Outpatient Medications:     Continuous Blood Gluc Sensor (FREESTYLE GABBI 2 SENSOR) MISC, 1 each by Does not apply route every 14 days, Disp: 2 each, Rfl: 1    Continuous Blood Gluc  (FREESTYLE GABBI 2 READER) CORINNE, 1 each by Does not apply route daily, Disp: 1 each, Rfl: 0    gabapentin (NEURONTIN) 100 MG capsule, Take 1 capsule by mouth nightly for 30 days. , Disp: 30 capsule, Rfl: 0    blood glucose monitor strips, Test 4 times a day & as needed for symptoms of irregular blood glucose. Dispense sufficient amount for indicated testing frequency plus additional to accommodate PRN testing needs. , Disp: 150 strip, Rfl: 2    insulin glargine (LANTUS SOLOSTAR) 100 UNIT/ML injection pen, Inject 20 Units into the skin nightly, Disp: 6 mL, Rfl: 0    insulin lispro, 1 Unit Dial, (HUMALOG KWIKPEN) 100 UNIT/ML SOPN, Inject 3 Units into the skin 3 times daily (before meals), Disp: 2.7 mL, Rfl: 0    Insulin Pen Needle ([employer] PEN NEEDLES 31G) 31G X 8 MM MISC, 1 each by Does not apply route daily, Disp: 100 each, Rfl: 3    Lancets MISC, 1 each by Does not apply route 4 times daily, Disp: 200 each, Rfl: 0    Magnesium Bisglycinate (MAG GLYCINATE PO), Take 2 tablets by mouth Daily, Disp: , Rfl:     ZINC PO, Take 1 tablet by mouth Daily, Disp: , Rfl: Glucosamine-Chondroitin (GLUCOSAMINE CHONDR COMPLEX PO), Take 2 tablets by mouth Daily, Disp: , Rfl:     VITAMIN D-VITAMIN K PO, Take 1 tablet by mouth Daily, Disp: , Rfl:     Immunizations: There is no immunization history on file for this patient. Recent A1c:     Lab Results   Component Value Date    LABA1C 12.7 (H) 08/18/2022        Home Blood Glucose Results: See below        Blood glucose trends noted:  Fasting BG over goal of 130 part of the time, postprandial BG after dinner is in range all of the time. Patient attributes changes in BG to eating diet low in carbohydrates . As he increases exercise length and intensity BG should continue to decrease. ASSESSMENT     Completed Journey for Control Session 2: \"Lets Talk About Diabetes and Healthy Eating\". Interactive discussion occurred regarding topics which include: Understanding Diabetes, Feelings About Food, Some Food Basics and What You Eat, How Much You Eat, When You Eat, Strategies for Healthy Eating, Challenges You Might Face, and Going for Your Goal and Your Support Network    Eating patterns:  He has made drastic changes to his diet since diagnosis along with encouragement from his wife. He cut out pop and ice cream, and is eating veggies for snack. Meals are very low in carbs but he does have the occasional pancake for breakfast or carb containing side with dinner. He would like to go back to 2% milk as he does not like the taste of almond milk. He states he will drink this glass of milk with dinner and make sure to keep dinner low in carbs. Exercise patterns: walking more, resuming normal ADLs like mowing lawn. He plans on increasing activity level since he is recovering well after toe amputation. Blood Glucose Testing:  Freestyle john has been ordered for patient but this has not been approved by insurance yet. Patient will likely be able to stop mealtime insulin soon which will cause him to not qualify for CGM.  We discussed continuing with fingersticks for now and reducing testing frequency to BID once he is able to get off Novolog. Diabetic Regimen/Compliance: Compliant with regimen     Other Medication Compliance: Compliant with regimen    PLAN     Diabetic Action Plan is shown below. Patient and provider worked together to create goals which patient will work toward prior to the next appointment.           Physician Follow-up:  As scheduled    Medication Management Follow-up:   Diabetes Service  10/5/22 to cover Session 3: \"Let's Talk About Monitoring Your Blood Glucose\"    Electronically signed by Brittni Dykes, [address] [place] [address] on 9/14/2022 at 8:03 AM    For Pharmacy Admin Tracking Only    Intervention Detail:   Total # of Interventions Recommended: 0  Total # of Interventions Accepted: 0  Time Spent (min): 60

## 2022-10-05 ENCOUNTER — HOSPITAL ENCOUNTER (OUTPATIENT)
Dept: PHARMACY | Age: 67
Setting detail: THERAPIES SERIES
Discharge: HOME | End: 2022-10-05
Payer: MEDICARE

## 2022-10-05 VITALS
SYSTOLIC BLOOD PRESSURE: 125 MMHG | DIASTOLIC BLOOD PRESSURE: 85 MMHG | HEIGHT: 74 IN | WEIGHT: 196.1 LBS | BODY MASS INDEX: 25.17 KG/M2 | HEART RATE: 127 BPM

## 2022-10-05 PROCEDURE — 99212 OFFICE O/P EST SF 10 MIN: CPT

## 2022-10-05 RX ORDER — METFORMIN HYDROCHLORIDE 500 MG/1
TABLET, EXTENDED RELEASE ORAL
Qty: 90 TABLET | Refills: 2 | OUTPATIENT
Start: 2022-10-05

## 2022-10-05 RX ORDER — GLUCOSAMINE HCL/CHONDROITIN SU 500-400 MG
1 CAPSULE ORAL 2 TIMES DAILY
Qty: 100 STRIP | Refills: 5 | OUTPATIENT
Start: 2022-10-05

## 2022-10-05 NOTE — PROGRESS NOTES
Diabetic Medication Management Program  [address]  [address] [place] [address]. [place], [address] [place] [address]  Phone: [phone]  Fax: [phone]    NAME: Enid De La Garza  MEDICAL RECORD NUMBER:  [id number]  AGE: 78 y.o. GENDER: male  : 1955  EPISODE DATE:  10/5/2022     Mr. Julio Dimas was referred to [facility] Management Services by Dr. Chelsy Torres. Patient acknowledges working in consult agreement with clinical pharmacist and this provider. Goals per referral:   Fasting blood glucose: < 120 - per provider referral  Peak postprandial glucose: < 180  A1C: < 7  Follow up with PCP every month per referral    SUBJECTIVE     Mr. Julio Dimas is a 78 y.o. male here for the Diabetes Service for self-management education, medication review including over the counter medications and herbal products, overall wellbeing assessment, transition of care and any needed adjustments with updates and recommendations communicated to the referring physician. Patient Findings:     Medication changes  Yes: Patient started metformin ER 500mg daily with dinner as prescribed by Dr. Leatha Meadows about 2-3 weeks ago. Around the same time he stopped his Lantus and Novolog as he was tired of using insulin. Diet changes  Yes: see below, patient following low carb diet  Activity changes  no  Emergency Room Visit or Hospitalization  no  Acute Illness/new problems  no  Symptoms of hypoglycemia  no -  patient had some episodes of dizziness unrelated to diabetes  in which he started to feel lightheaded after standing up. He tested BG at the time and it was 170. I encouraged him to speak with PCP as this may be a heart related issue. Of note, his HR in clinic was 127 but his wife states his HR is usually on the higher side (over 110)  Symptoms of hyperglycemia  no - none  Medication adverse reactions  none    Comments: Patient has been feeling much better as BG came down.  He states he only has to urinate once per night instead of multiple times as he had when diabetes was uncontrolled. OBJECTIVE     PMHx:  No past medical history on file. Social History:    Social History     Tobacco Use    Smoking status: Never    Smokeless tobacco: Never   Substance Use Topics    Alcohol use: Not on file     Comment: rarely       Pertinent Labs:    Lab Results   Component Value Date    LABA1C 12.7 (H) 08/18/2022     No results found for: CHOL, TRIG, HDL, LDLCALC  Lab Results   Component Value Date    CREATININE 1.13 08/23/2022    BUN 13 08/23/2022     08/23/2022    K 4.0 08/23/2022     08/23/2022    CO2 26 08/23/2022     No results found for: ALT    Weight:  Wt Readings from Last 3 Encounters:   10/05/22 196 lb 1.6 oz (89 kg)   09/14/22 201 lb 3.2 oz (91.3 kg)   08/31/22 206 lb 12.8 oz (93.8 kg)       Blood Pressure:  BP Readings from Last 3 Encounters:   10/05/22 125/85   09/14/22 129/82   08/31/22 113/75       Current medications:    Current Outpatient Medications:     blood glucose monitor strips, 1 strip by Other route 2 times daily Test 2 times a day as directed TRUE METRIX BRAND PLEASE, Disp: 100 strip, Rfl: 5    metFORMIN (GLUCOPHAGE XR) 500 MG extended release tablet, Take 1 tablet in the morning and 2 tablets in the evening, Disp: 90 tablet, Rfl: 2    gabapentin (NEURONTIN) 100 MG capsule, Take 1 capsule by mouth nightly for 30 days. , Disp: 30 capsule, Rfl: 0    blood glucose monitor strips, Test 4 times a day & as needed for symptoms of irregular blood glucose. Dispense sufficient amount for indicated testing frequency plus additional to accommodate PRN testing needs. , Disp: 150 strip, Rfl: 2    Lancets MISC, 1 each by Does not apply route 4 times daily, Disp: 200 each, Rfl: 0    Magnesium Bisglycinate (MAG GLYCINATE PO), Take 2 tablets by mouth Daily, Disp: , Rfl:     ZINC PO, Take 1 tablet by mouth Daily, Disp: , Rfl:     Glucosamine-Chondroitin (GLUCOSAMINE CHONDR COMPLEX PO), Take 2 tablets by mouth Daily, Disp: , Rfl:     VITAMIN D-VITAMIN K PO, Take 1 tablet by mouth Daily, Disp: , Rfl:     Immunizations: There is no immunization history on file for this patient. Home Blood Glucose Results: See below    Blood glucose trends noted:  Fasting BG still above goal but acceptable considering patient is now off Lantus 20 units nightly and Novolog 3 units with each meal - only using metformin 500mg daily. As we titrate metformin I anticipate fasting BG to approach goal range of < 120  (per physician preference)    Patient expresses he feels discouraged by these numbers. I provided encouragement to him because compared to an A1c of 12.7%, his current average BG is likely more in line with an A1c under 8%. This is a huge accomplishment considering he is mainly diet controlled. He understands that as we increase metformin his BG should start to drop below 120 before breakfast.    ASSESSMENT     Recent A1c: 12.7    Lab Results   Component Value Date    LABA1C 12.7 (H) 08/18/2022        Eating patterns: patient is following a low carb diet, almost always eating less than 30g of carbs per meal. He states most of the time he skips lunch and is satisfied with eating only breakfast and dinner. He has cut out his bedtime snack most of the time and is drinking only zero carb beverages with the exception of a glass of milk with dinner.  As noted on plan sheet below, we discussed adding in some low carb snacks like nuts or cheese to help prevent further weight loss, as he is now down to 196lb (was 206lb at appt on 8/30/22)    Exercise patterns: minimal exercise, patient would like to work on increasing activity level     Blood Glucose Testing:  testing BID before breakfast and lunch    Current Diabetic Medications: metformin ER 500mg with dinner     Diabetic Regimen/Compliance: compliant     Other Medication Compliance: did not assess      PLAN     Titrate metformin as noted below  A1c at next appt on 11/22  Increase activity level by walking around the mall or another store store for 15-20 minutes three times weekly for the next 2 weeks  Continue low carb diet. Continue formal education program at next appt or the following appt with Session 3 (monitoring)    Diabetic Action Plan is shown below. Patient and provider worked together to create goals which patient will work toward prior to the next appointment.         Physician Follow-up:  As scheduled    Medication Management Follow-up:   Diabetes Service  11/22/22    Electronically signed by LUCIAN Nair [facility] on 10/5/2022 at 9:26 AM    For Pharmacy Admin Tracking Only    Intervention Detail: Refill(s) Provided  Total # of Interventions Recommended: 2  Total # of Interventions Accepted: 2  Time Spent (min): 60

## 2022-11-22 ENCOUNTER — HOSPITAL ENCOUNTER (OUTPATIENT)
Age: 67
Discharge: HOME | End: 2022-11-22
Payer: MEDICARE

## 2022-11-22 ENCOUNTER — HOSPITAL ENCOUNTER (OUTPATIENT)
Dept: PHARMACY | Age: 67
Setting detail: THERAPIES SERIES
Discharge: HOME | End: 2022-11-22
Payer: MEDICARE

## 2022-11-22 VITALS
DIASTOLIC BLOOD PRESSURE: 73 MMHG | BODY MASS INDEX: 24.45 KG/M2 | SYSTOLIC BLOOD PRESSURE: 135 MMHG | OXYGEN SATURATION: 98 % | TEMPERATURE: 97.8 F | HEART RATE: 114 BPM | WEIGHT: 190.4 LBS

## 2022-11-22 LAB
ALBUMIN SERPL-MCNC: 4 G/DL (ref 3.5–5.2)
ALBUMIN/GLOBULIN RATIO: 1.2 (ref 1–2.5)
ALP BLD-CCNC: 68 U/L (ref 40–129)
ALT SERPL-CCNC: 16 U/L (ref 5–41)
ANION GAP SERPL CALCULATED.3IONS-SCNC: 15 MMOL/L (ref 9–17)
AST SERPL-CCNC: 21 U/L
BILIRUB SERPL-MCNC: 0.4 MG/DL (ref 0.3–1.2)
BUN BLDV-MCNC: 28 MG/DL (ref 8–23)
CALCIUM SERPL-MCNC: 9.9 MG/DL (ref 8.6–10.4)
CHLORIDE BLD-SCNC: 101 MMOL/L (ref 98–107)
CHOLESTEROL/HDL RATIO: 5
CHOLESTEROL: 226 MG/DL
CO2: 24 MMOL/L (ref 20–31)
CREAT SERPL-MCNC: 1.12 MG/DL (ref 0.7–1.2)
CREATININE URINE: 93.7 MG/DL (ref 39–259)
ESTIMATED AVERAGE GLUCOSE: 143 MG/DL
GFR SERPL CREATININE-BSD FRML MDRD: >60 ML/MIN/1.73M2
GLUCOSE BLD-MCNC: 162 MG/DL (ref 70–99)
HBA1C MFR BLD: 6.6 % (ref 4–6)
HCT VFR BLD CALC: 43.4 % (ref 40.7–50.3)
HDLC SERPL-MCNC: 45 MG/DL
HEMOGLOBIN: 14.2 G/DL (ref 13–17)
HEPATITIS C ANTIBODY: NONREACTIVE
HIV AG/AB: NONREACTIVE
LDL CHOLESTEROL: 140 MG/DL (ref 0–130)
MCH RBC QN AUTO: 31.2 PG (ref 25.2–33.5)
MCHC RBC AUTO-ENTMCNC: 32.7 G/DL (ref 28.4–34.8)
MCV RBC AUTO: 95.4 FL (ref 82.6–102.9)
MICROALBUMIN/CREAT 24H UR: <12 MG/L
MICROALBUMIN/CREAT UR-RTO: NORMAL MCG/MG CREAT
NRBC AUTOMATED: 0 PER 100 WBC
PDW BLD-RTO: 14.2 % (ref 11.8–14.4)
PLATELET # BLD: 227 K/UL (ref 138–453)
PMV BLD AUTO: 11.1 FL (ref 8.1–13.5)
POTASSIUM SERPL-SCNC: 4.2 MMOL/L (ref 3.7–5.3)
RBC # BLD: 4.55 M/UL (ref 4.21–5.77)
SODIUM BLD-SCNC: 140 MMOL/L (ref 135–144)
TOTAL PROTEIN: 7.4 G/DL (ref 6.4–8.3)
TRIGL SERPL-MCNC: 203 MG/DL
TSH SERPL DL<=0.05 MIU/L-ACNC: 3.89 UIU/ML (ref 0.3–5)
WBC # BLD: 7.3 K/UL (ref 3.5–11.3)

## 2022-11-22 PROCEDURE — 85027 COMPLETE CBC AUTOMATED: CPT

## 2022-11-22 PROCEDURE — 36415 COLL VENOUS BLD VENIPUNCTURE: CPT

## 2022-11-22 PROCEDURE — 80053 COMPREHEN METABOLIC PANEL: CPT

## 2022-11-22 PROCEDURE — 86803 HEPATITIS C AB TEST: CPT

## 2022-11-22 PROCEDURE — 84443 ASSAY THYROID STIM HORMONE: CPT

## 2022-11-22 PROCEDURE — 80061 LIPID PANEL: CPT

## 2022-11-22 PROCEDURE — 99212 OFFICE O/P EST SF 10 MIN: CPT

## 2022-11-22 PROCEDURE — 83036 HEMOGLOBIN GLYCOSYLATED A1C: CPT

## 2022-11-22 PROCEDURE — 87389 HIV-1 AG W/HIV-1&-2 AB AG IA: CPT

## 2022-11-22 PROCEDURE — 82043 UR ALBUMIN QUANTITATIVE: CPT

## 2022-11-22 PROCEDURE — 82570 ASSAY OF URINE CREATININE: CPT

## 2022-11-22 NOTE — PROGRESS NOTES
Diabetic Medication Management Program  [facility] - INPATIENT  [address] [facility]. [place], [address] [facility]  Phone: [phone]  Fax: [phone]    NAME: Karly Lainez  MEDICAL RECORD NUMBER:  [id number]  AGE: 78 y.o. GENDER: male  : 1955  EPISODE DATE:  2022     Mr. Victoria Rivas was referred to [facility] Medication Management Services by Dr. Hayden Ocampo. Patient acknowledges working in consult agreement with clinical pharmacist and this provider. Goals per referral:   Fasting blood glucose: < 120 - per provider referral  Peak postprandial glucose: < 180  A1C: < 7  Follow up with PCP every month per referral    SUBJECTIVE     Mr. Victoria Rivas is a 78 y.o. male here for the Diabetes Service for self-management education, medication review including over the counter medications and herbal products, overall wellbeing assessment, transition of care and any needed adjustments with updates and recommendations communicated to the referring physician. Patient Findings:     Medication changes  Yes: Patient has transitioned to Metformin 500mg AM and 1000mg PM as instructed last visit. Diet changes   Patient continues low carb diet with focus on less than 30gm carbs per main meal. He has been working on weight loss to get BMI into normal range. Activity changes  Yes: Patient has been riding stationary bike 6 days weekly for 10-15 minutes a day. He challenges himself with number of pulls back with his arms and also plans to challenge with peddling resistance. Emergency Room Visit or Hospitalization  no  Acute Illness/new problems  no  Symptoms of hypoglycemia  no - none  Symptoms of hyperglycemia  no - none  Medication adverse reactions  none    Comments: Since continuing low carb diet and increasing exercise patient states he is feeling better. He has not had increased urination as he did previously.   He did note getting up a few more times than normal last night but attributes that to increased water intake. Suggested making a \"do not drink after\" time to aid with this. In addition, patient should void prior to bedtime. Patient does note that sometimes in the morning he feels off and slightly dizzy. He has been trying to take time to sit on the bedside prior to staying to avoid ortho stasis. OBJECTIVE     PMHx:  No past medical history on file. Social History:    Social History     Tobacco Use    Smoking status: Never    Smokeless tobacco: Never   Substance Use Topics    Alcohol use: Not on file     Comment: rarely     Pertinent Labs:    Lab Results   Component Value Date    LABA1C 6.6 (H) 11/22/2022    LABA1C 12.7 (H) 08/18/2022     No results found for: CHOL, TRIG, HDL, LDLCALC  Lab Results   Component Value Date    CREATININE 1.13 08/23/2022    BUN 13 08/23/2022     08/23/2022    K 4.0 08/23/2022     08/23/2022    CO2 26 08/23/2022     No results found for: ALT    Weight:  Wt Readings from Last 3 Encounters:   11/22/22 190 lb 6.4 oz (86.4 kg)   10/05/22 196 lb 1.6 oz (89 kg)   09/14/22 201 lb 3.2 oz (91.3 kg)     Blood Pressure:  BP Readings from Last 3 Encounters:   11/22/22 135/73   10/05/22 125/85   09/14/22 129/82     Current medications:    Current Outpatient Medications:     blood glucose monitor strips, 1 strip by Other route 2 times daily Test 2 times a day as directed TRUE METRIX BRAND PLEASE, Disp: 100 strip, Rfl: 5    metFORMIN (GLUCOPHAGE XR) 500 MG extended release tablet, Take 1 tablet in the morning and 2 tablets in the evening, Disp: 90 tablet, Rfl: 2    gabapentin (NEURONTIN) 100 MG capsule, Take 1 capsule by mouth nightly for 30 days. , Disp: 30 capsule, Rfl: 0    blood glucose monitor strips, Test 4 times a day & as needed for symptoms of irregular blood glucose. Dispense sufficient amount for indicated testing frequency plus additional to accommodate PRN testing needs. , Disp: 150 strip, Rfl: 2    Lancets MISC, 1 each by Does not apply route 4 times daily, Disp: 200 each, Rfl: 0    Magnesium Bisglycinate (MAG GLYCINATE PO), Take 2 tablets by mouth Daily, Disp: , Rfl:     ZINC PO, Take 1 tablet by mouth Daily, Disp: , Rfl:     Glucosamine-Chondroitin (GLUCOSAMINE CHONDR COMPLEX PO), Take 2 tablets by mouth Daily, Disp: , Rfl:     VITAMIN D-VITAMIN K PO, Take 1 tablet by mouth Daily, Disp: , Rfl:     Immunizations: There is no immunization history on file for this patient. Home Blood Glucose Results: See below                Blood glucose trends noted:  BG under goal of 130 prior to meals very consistently    ASSESSMENT     Recent A1c: 6.6 today. WOW! Great improvement! Lab Results   Component Value Date    LABA1C 6.6 (H) 11/22/2022    LABA1C 12.7 (H) 08/18/2022      Eating patterns: Patient note sometimes skipping breakfast.  He was encouraged to avoid skipping meals and attempt to have something higher in protein in the morning. Some examples included Premier Protein drink or high protein granola bars, each of which are low in carbs. Exercise patterns: Patient increased activity and is riding stationary bike 6 days weekly as noted above. He also states he completed the [employer] walking event. Blood Glucose Testing:  Testing per logs above. Potentially interested in CGM but patient does not meet medicare criteria for coverage and would need to purchase out of pocket. He states he will let me know if he would like an order sent to his pharmacy. We also discussed the possibility of applying a LibrePro sensor in clinic for a 2 week trial of the device and to assess BG trends with current habits and medication regimen. Current Diabetic Medications: Metformin XR 500mg AM and 1000mg PM     Diabetic Regimen/Compliance: Compliant with regimen     Other Medication Compliance: Did not assess    Other: Patient again had elevated heart rate today with 2 different readings 116 and 114.   I educated patient on concern for cardiovascular complications that can occur if tachycardia is not addressed. BP reading itself was < 140/90 which is appropriate for a diabetic patient, however, patient complaints of dizziness and also a reported fall are worrisome. Advised patient to log BP with HR every day for the next week and take to upcoming PCP appointment. I let him know he can expect to start on a medication to help with the heart rate, possibly a beta blocker. He was educated on potential for fatigue and exercise intolerance as well. We also discussed possible addition of ACEI/ARB to help with blood pressure and also protection of the kidneys which is recommended in the diabetic population. He voiced understanding. Completed Journey for Control Session 3: \"Let's Talk About Monitoring Your Blood Glucose\" Interactive discussion occurred regarding topics which include:Basics of Blood glucose, Blood Glucose Targers, Monitoring Blood Glucose at Home, Monitoring Blood glucose With Your Doctor, What Makes Blood Glucose Go Up and Down, Managing Low Blood Glucose, Managing High Blood Glucose, Using Your Results, and Going for Your Goal and Your Support Network     PLAN     Diabetic Action Plan is shown below. Patient and provider worked together to create goals which patient will work toward prior to the next appointment.       Physician Follow-up:  As scheduled    Medication Management Follow-up:   Diabetes Service  3 weeks - will complete session 4 of Journey for Control and consider Caroline Pro    Electronically signed by LUCIAN Davis ER[facility] on 11/22/2022 at 8:28 AM    For Pharmacy Admin Tracking Only    Intervention Detail: Lab(s) Ordered  Total # of Interventions Recommended: 1  Total # of Interventions Accepted: 1  Time Spent (min): 60

## 2022-12-14 ENCOUNTER — HOSPITAL ENCOUNTER (OUTPATIENT)
Dept: PHARMACY | Age: 67
Setting detail: THERAPIES SERIES
Discharge: HOME | End: 2022-12-14
Payer: MEDICARE

## 2022-12-14 VITALS
OXYGEN SATURATION: 99 % | BODY MASS INDEX: 23.69 KG/M2 | HEART RATE: 105 BPM | SYSTOLIC BLOOD PRESSURE: 128 MMHG | TEMPERATURE: 97.5 F | DIASTOLIC BLOOD PRESSURE: 84 MMHG | WEIGHT: 184.5 LBS

## 2022-12-14 PROCEDURE — 99211 OFF/OP EST MAY X REQ PHY/QHP: CPT

## 2022-12-14 NOTE — PROGRESS NOTES
Diabetic Medication Management Program  [address]. [place], [address] [place] [address]  Phone: [phone]  Fax: [phone]    NAME: Tiffani Seymour  MEDICAL RECORD NUMBER:  [id number]  AGE: 78 y.o. GENDER: male  : 1955  EPISODE DATE:  2022     Mr. Dianelys Zamora was referred to [address] [place] [address] Medication Management Services by Dr. Romeo Rudolph. Patient acknowledges working in consult agreement with clinical pharmacist and this provider. Goals per referral:   Fasting blood glucose: < 130   Peak postprandial glucose: < 180  A1C: < 7    SUBJECTIVE     Mr. Dianelys Zamora is a 78 y.o. male here for the Diabetes Service for self-management education, medication review including over the counter medications and herbal products, overall wellbeing assessment, transition of care and any needed adjustments with updates and recommendations communicated to the referring physician. Patient Findings:   Medication changes  no  Diet changes  no  Activity changes  no  Emergency Room Visit or Hospitalization  no  Acute Illness/new problems  no  Symptoms of hypoglycemia  yes - dizziness  Symptoms of hyperglycemia  no - none  Medication adverse reactions  none    Comments: Dizziness noted above is not confirmed to be from low blood sugar and may possibly be related to rising too quickly from laying first thing in the morning. Patient educated to take his time sitting up and resting on the bed prior to standing. OBJECTIVE     PMHx:  No past medical history on file.     Social History:    Social History     Tobacco Use    Smoking status: Never    Smokeless tobacco: Never   Substance Use Topics    Alcohol use: Not on file     Comment: rarely     Pertinent Labs:    Lab Results   Component Value Date    LABA1C 6.6 (H) 2022    LABA1C 12.7 (H) 2022     Lab Results   Component Value Date    CHOL 226 (H) 2022    TRIG 203 (H) 2022    HDL 45 2022     Lab Results   Component Value Date    CREATININE 1.12 11/22/2022    BUN 28 (H) 11/22/2022     11/22/2022    K 4.2 11/22/2022     11/22/2022    CO2 24 11/22/2022     Lab Results   Component Value Date/Time    ALT 16 11/22/2022 08:05 AM     Weight:  Wt Readings from Last 3 Encounters:   12/14/22 184 lb 8 oz (83.7 kg)   11/22/22 190 lb 6.4 oz (86.4 kg)   10/05/22 196 lb 1.6 oz (89 kg)     Blood Pressure:  BP Readings from Last 3 Encounters:   12/14/22 128/84   11/22/22 135/73   10/05/22 125/85     Current medications:    Current Outpatient Medications:     blood glucose monitor strips, 1 strip by Other route 2 times daily Test 2 times a day as directed TRUE METRIX BRAND PLEASE, Disp: 100 strip, Rfl: 5    metFORMIN (GLUCOPHAGE XR) 500 MG extended release tablet, Take 1 tablet in the morning and 2 tablets in the evening, Disp: 90 tablet, Rfl: 2    gabapentin (NEURONTIN) 100 MG capsule, Take 1 capsule by mouth nightly for 30 days. , Disp: 30 capsule, Rfl: 0    blood glucose monitor strips, Test 4 times a day & as needed for symptoms of irregular blood glucose. Dispense sufficient amount for indicated testing frequency plus additional to accommodate PRN testing needs. , Disp: 150 strip, Rfl: 2    Lancets MISC, 1 each by Does not apply route 4 times daily, Disp: 200 each, Rfl: 0    Magnesium Bisglycinate (MAG GLYCINATE PO), Take 2 tablets by mouth Daily, Disp: , Rfl:     ZINC PO, Take 1 tablet by mouth Daily, Disp: , Rfl:     Glucosamine-Chondroitin (GLUCOSAMINE CHONDR COMPLEX PO), Take 2 tablets by mouth Daily, Disp: , Rfl:     VITAMIN D-VITAMIN K PO, Take 1 tablet by mouth Daily, Disp: , Rfl:     Immunizations: There is no immunization history on file for this patient.     Home Blood Glucose Results: See below          Blood glucose trends noted:  BG < 130 the majority of the time    ASSESSMENT     Recent A1c:     Lab Results   Component Value Date    LABA1C 6.6 (H) 11/22/2022    LABA1C 12.7 (H) 08/18/2022        Eating patterns: Continues very low carb diet and is happy with food choices. Encouraged patient to use the full 30-45gm of carbs per main meal as he states he currently is well under that. Exercise patterns: Continues stationary bike daily. Blood Glucose Testing:  Testing once daily  most days    Current Diabetic Medications: Metformin 500mg AM and 1000mg PM     Diabetic Regimen/Compliance: Compliant     Other Medication Compliance: Not taking any other medications/supplements currently. Suggested Fish oil to help with triglycerides. PLAN   Diabetic Action Plan is shown below. Patient and provider worked together to create goals which patient will work toward prior to the next appointment.         Physician Follow-up:  As scheduled    Medication Management Follow-up:   Diabetes Service  3 months    Electronically signed by LUCIAN Glover [facility] on 12/14/2022 at 8:46 AM    For Pharmacy Admin Tracking Only    Intervention Detail:   Total # of Interventions Recommended: 0  Total # of Interventions Accepted: 0  Time Spent (min): 60

## 2023-01-10 RX ORDER — METFORMIN HYDROCHLORIDE 500 MG/1
TABLET, EXTENDED RELEASE ORAL
Qty: 360 TABLET | Refills: 1 | OUTPATIENT
Start: 2023-01-10

## 2023-01-10 NOTE — TELEPHONE ENCOUNTER
Sent refill on Metformin to [facility] per patient request.    For Pharmacy Admin Tracking Only    Intervention Detail: New Rx: 1, reason: Patient Preference  Total # of Interventions Recommended: 1  Total # of Interventions Accepted: 1  Time Spent (min): 10

## 2023-02-22 ENCOUNTER — HOSPITAL ENCOUNTER (OUTPATIENT)
Age: 68
Setting detail: SPECIMEN
Discharge: HOME | End: 2023-02-22

## 2023-02-22 ENCOUNTER — HOSPITAL ENCOUNTER (OUTPATIENT)
Dept: PHARMACY | Age: 68
Setting detail: THERAPIES SERIES
Discharge: HOME | End: 2023-02-22
Payer: MEDICARE

## 2023-02-22 VITALS
TEMPERATURE: 97.3 F | SYSTOLIC BLOOD PRESSURE: 129 MMHG | DIASTOLIC BLOOD PRESSURE: 81 MMHG | OXYGEN SATURATION: 100 % | HEART RATE: 106 BPM | WEIGHT: 186.4 LBS | BODY MASS INDEX: 23.93 KG/M2

## 2023-02-22 LAB
EST. AVERAGE GLUCOSE BLD GHB EST-MCNC: 128 MG/DL
HBA1C MFR BLD: 6.1 % (ref 4–6)

## 2023-02-22 PROCEDURE — 99212 OFFICE O/P EST SF 10 MIN: CPT

## 2023-02-22 PROCEDURE — 36415 COLL VENOUS BLD VENIPUNCTURE: CPT

## 2023-02-22 PROCEDURE — 83036 HEMOGLOBIN GLYCOSYLATED A1C: CPT

## 2023-02-22 NOTE — PROGRESS NOTES
Diabetic Medication Management Program  [address]. [facility], [address] [facility]  Phone: [phone]  Fax: [phone]    NAME: Sonam Jones  MEDICAL RECORD NUMBER:  [id number]  AGE: 78 y.o. GENDER: male  : 1955  EPISODE DATE:  2023     Mr. Dennise Webb was referred to [address] [place] [address] Medication Management Services by Dr. Trevon Burton. Patient acknowledges working in consult agreement with clinical pharmacist and this provider. Goals per referral:   Fasting blood glucose: < 130  Peak postprandial glucose: < 180  A1C: < 7    SUBJECTIVE     Mr. Dennise Webb is a 78 y.o. male here for the Diabetes Service for self-management education, medication review including over the counter medications and herbal products, overall wellbeing assessment, transition of care and any needed adjustments with updates and recommendations communicated to the referring physician. Patient Findings:     Medication changes  no  Diet changes   3-5 pacheco a day and also incorporates Gatorade zero. Activity changes   3-5 times/week with stationary bike. Emergency Room Visit or Hospitalization  no  Acute Illness/new problems   Still continuing to have dizziness. PCP is aware. Symptoms of hypoglycemia  no - none  Symptoms of hyperglycemia  no - none  Medication adverse reactions  none    Comments: Patient is still experiencing dizziness but it has gotten better since the last visit. He has been trying to rise more slowing whenever he gets up and says that has been helping some. Educated patient to continue to practice rising slowly and also provide brochure with more information on orthostatic hypotension. OBJECTIVE     PMHx:  No past medical history on file.     Social History:    Social History     Tobacco Use    Smoking status: Never    Smokeless tobacco: Never   Substance Use Topics    Alcohol use: Not on file     Comment: rarely       Pertinent Labs:    Lab Results   Component Value Date LABA1C 6.1 (H) 02/22/2023    LABA1C 6.6 (H) 11/22/2022    LABA1C 12.7 (H) 08/18/2022     Lab Results   Component Value Date    CHOL 226 (H) 11/22/2022    TRIG 203 (H) 11/22/2022    HDL 45 11/22/2022     Lab Results   Component Value Date    CREATININE 1.12 11/22/2022    BUN 28 (H) 11/22/2022     11/22/2022    K 4.2 11/22/2022     11/22/2022    CO2 24 11/22/2022     Lab Results   Component Value Date/Time    ALT 16 11/22/2022 08:05 AM       Weight:  Wt Readings from Last 3 Encounters:   02/22/23 186 lb 6.4 oz (84.6 kg)   12/14/22 184 lb 8 oz (83.7 kg)   11/22/22 190 lb 6.4 oz (86.4 kg)       Blood Pressure:  BP Readings from Last 3 Encounters:   02/22/23 129/81   12/14/22 128/84   11/22/22 135/73       Current medications:  Current Outpatient Medications:     metFORMIN (GLUCOPHAGE XR) 500 MG extended release tablet, Take up to 2 tablets by mouth twice daily as directed, Disp: 360 tablet, Rfl: 1    blood glucose monitor strips, 1 strip by Other route 2 times daily Test 2 times a day as directed TRUE METRIX BRAND PLEASE, Disp: 100 strip, Rfl: 5    blood glucose monitor strips, Test 4 times a day & as needed for symptoms of irregular blood glucose. Dispense sufficient amount for indicated testing frequency plus additional to accommodate PRN testing needs. , Disp: 150 strip, Rfl: 2    Lancets MISC, 1 each by Does not apply route 4 times daily, Disp: 200 each, Rfl: 0    Immunizations: There is no immunization history on file for this patient. Home Blood Glucose Results: See below    Blood glucose trends noted:  Testing once a week with range 100-120    ASSESSMENT     Recent A1c: 6.1% TODAY!!!!    Lab Results   Component Value Date    LABA1C 6.1 (H) 02/22/2023    LABA1C 6.6 (H) 11/22/2022    LABA1C 12.7 (H) 08/18/2022        Eating patterns: Eating twice a day at breakfast and dinner and staying hydrated. Continues to be low carb with portion control.  He states for snacks he has a protein drink or [employer] protein bars after dinner. Suggested to patient to try Skinny pop as another alternative snack since it has 12 grams of carbs in 3 cups. Exercise patterns: 3-5 times a week with stationary bike and also walks around larger grocery stores when him and his wife shop. Blood Glucose Testing:  Patient is testing once a week and ranging between 100-120. He had one higher reading of 138 but we identified stress as the cause due to recent family passing. Current Diabetic Medications: Just last week patient had decreased metformin dose to 500 mg AM and 500 mg PM. We will continue this regimen due to the recent A1C results. Diabetic Regimen/Compliance: compliant      Other Medication Compliance: Started taking fish oil 1000 mg daily. Patient's wife inquired about the supplement 'berberine' and the effects on glucose. Informed patient that it has some studies that have shown effects of lowering glucose as well as cholesterol but those results were not significant compared to typical diabetic therapy like metformin. Suggested to to error on the caution side when starting as supplements are not as extensively studied. Educated that if patient was to start supplement to monitor blood sugars more closely to see if there are any harmful effects. PLAN   Diabetic Action Plan is shown below. Patient and provider worked together to create goals which patient will work toward prior to the next appointment.         Physician Follow-up:  As scheduled    Medication Management Follow-up:   Diabetes Service  in person May 24th, 2023 @ 8 am     Electronically signed by Cristian Lomeli on 2/22/2023 at 7:57 AM    For Pharmacy Admin Tracking Only    Program: Medication Management  CPA in place:  Yes  Recommendation Provided To: Patient/Caregiver: 2 via In person  Intervention Detail: Lab(s) Ordered  Intervention Accepted By: Patient/Caregiver: 2  Gap Closed?: Yes   Time Spent (min): 60

## 2023-05-24 ENCOUNTER — HOSPITAL ENCOUNTER (OUTPATIENT)
Dept: PHARMACY | Age: 68
Setting detail: THERAPIES SERIES
Discharge: HOME | End: 2023-05-24
Payer: MEDICARE

## 2023-05-24 ENCOUNTER — HOSPITAL ENCOUNTER (OUTPATIENT)
Age: 68
Discharge: HOME | End: 2023-05-24
Payer: MEDICARE

## 2023-05-24 VITALS
SYSTOLIC BLOOD PRESSURE: 130 MMHG | HEIGHT: 74 IN | WEIGHT: 196 LBS | HEART RATE: 100 BPM | BODY MASS INDEX: 25.15 KG/M2 | DIASTOLIC BLOOD PRESSURE: 80 MMHG

## 2023-05-24 LAB
ALBUMIN SERPL-MCNC: 4 G/DL (ref 3.5–5.2)
ALBUMIN/GLOB SERPL: 1.3 {RATIO} (ref 1–2.5)
ALP SERPL-CCNC: 62 U/L (ref 40–129)
ALT SERPL-CCNC: 14 U/L (ref 5–41)
ANION GAP SERPL CALCULATED.3IONS-SCNC: 12 MMOL/L (ref 9–17)
AST SERPL-CCNC: 17 U/L
BASOPHILS # BLD: 0.05 K/UL (ref 0–0.2)
BASOPHILS NFR BLD: 1 % (ref 0–2)
BILIRUB SERPL-MCNC: 0.7 MG/DL (ref 0.3–1.2)
BUN SERPL-MCNC: 19 MG/DL (ref 8–23)
CALCIUM SERPL-MCNC: 9.5 MG/DL (ref 8.6–10.4)
CHLORIDE SERPL-SCNC: 104 MMOL/L (ref 98–107)
CHOLEST SERPL-MCNC: 238 MG/DL
CHOLESTEROL/HDL RATIO: 4.7
CO2 SERPL-SCNC: 25 MMOL/L (ref 20–31)
CREAT SERPL-MCNC: 0.92 MG/DL (ref 0.7–1.2)
EOSINOPHIL # BLD: 0.3 K/UL (ref 0–0.44)
EOSINOPHILS RELATIVE PERCENT: 4 % (ref 1–4)
ERYTHROCYTE [DISTWIDTH] IN BLOOD BY AUTOMATED COUNT: 12.7 % (ref 11.8–14.4)
EST. AVERAGE GLUCOSE BLD GHB EST-MCNC: 137 MG/DL
GFR SERPL CREATININE-BSD FRML MDRD: >60 ML/MIN/1.73M2
GLUCOSE SERPL-MCNC: 138 MG/DL (ref 70–99)
HBA1C MFR BLD: 6.4 % (ref 4–6)
HCT VFR BLD AUTO: 46.4 % (ref 40.7–50.3)
HDLC SERPL-MCNC: 51 MG/DL
HGB BLD-MCNC: 15.3 G/DL (ref 13–17)
IMM GRANULOCYTES # BLD AUTO: <0.03 K/UL (ref 0–0.3)
IMM GRANULOCYTES NFR BLD: 0 %
LDLC SERPL CALC-MCNC: 164 MG/DL (ref 0–130)
LYMPHOCYTES # BLD: 24 % (ref 24–43)
LYMPHOCYTES NFR BLD: 1.61 K/UL (ref 1.1–3.7)
MCH RBC QN AUTO: 31.7 PG (ref 25.2–33.5)
MCHC RBC AUTO-ENTMCNC: 33 G/DL (ref 28.4–34.8)
MCV RBC AUTO: 96.3 FL (ref 82.6–102.9)
MONOCYTES NFR BLD: 0.59 K/UL (ref 0.1–1.2)
MONOCYTES NFR BLD: 9 % (ref 3–12)
NEUTROPHILS NFR BLD: 62 % (ref 36–65)
NEUTS SEG NFR BLD: 4.28 K/UL (ref 1.5–8.1)
NRBC AUTOMATED: 0 PER 100 WBC
PLATELET # BLD AUTO: 219 K/UL (ref 138–453)
PMV BLD AUTO: 10.6 FL (ref 8.1–13.5)
POTASSIUM SERPL-SCNC: 4 MMOL/L (ref 3.7–5.3)
PROT SERPL-MCNC: 7.2 G/DL (ref 6.4–8.3)
RBC # BLD AUTO: 4.82 M/UL (ref 4.21–5.77)
SODIUM SERPL-SCNC: 141 MMOL/L (ref 135–144)
TRIGL SERPL-MCNC: 113 MG/DL
WBC OTHER # BLD: 6.9 K/UL (ref 3.5–11.3)

## 2023-05-24 PROCEDURE — 85025 COMPLETE CBC W/AUTO DIFF WBC: CPT

## 2023-05-24 PROCEDURE — 99211 OFF/OP EST MAY X REQ PHY/QHP: CPT

## 2023-05-24 PROCEDURE — 80061 LIPID PANEL: CPT

## 2023-05-24 PROCEDURE — 36415 COLL VENOUS BLD VENIPUNCTURE: CPT

## 2023-05-24 PROCEDURE — 80053 COMPREHEN METABOLIC PANEL: CPT

## 2023-05-24 PROCEDURE — 83036 HEMOGLOBIN GLYCOSYLATED A1C: CPT

## 2023-05-24 RX ORDER — METFORMIN HYDROCHLORIDE 500 MG/1
500 TABLET, EXTENDED RELEASE ORAL 2 TIMES DAILY WITH MEALS
COMMUNITY

## 2023-05-24 RX ORDER — CHLORAL HYDRATE 500 MG
1000 CAPSULE ORAL DAILY
COMMUNITY

## 2023-05-24 RX ORDER — ASCORBIC ACID 500 MG
500 TABLET ORAL DAILY
COMMUNITY

## 2023-05-24 NOTE — PROGRESS NOTES
Diabetic Medication Management Program  [address]. [facility], [address] [facility]  Phone: [phone]  Fax: [phone]    NAME: Armando Cerna  MEDICAL RECORD NUMBER:  [id number]  AGE: 78 y.o. GENDER: male  : 1955  EPISODE DATE:  2023     Mr. Missy Slade was referred to [facility] Medication Management Services by Dr. Liang Vargas. Patient acknowledges working in consult agreement with clinical pharmacist and this provider. Goals per referral:   Fasting blood glucose: < 130  Peak postprandial glucose: < 180  A1C: < 7    SUBJECTIVE     Mr. Missy Slade is a 78 y.o. male here for the Diabetes Service for self-management education, medication review including over the counter medications and herbal products, overall wellbeing assessment, transition of care and any needed adjustments with updates and recommendations communicated to the referring physician. Patient Findings:     Medication changes  no  Diet changes  Yes: more snacking/\"cheating\" in the last few months  Activity changes  no - walks regularly and doing a lot of yard work  Emergency Room Visit or Hospitalization  no  Acute Illness/new problems  no  Symptoms of hypoglycemia  no - none  Symptoms of hyperglycemia  no - none  Medication adverse reactions  none    Comments: patient states this is the best he has felt in a while. However weight is up 10 lbs in 3 months and he would like to get back down under 190 lbs. OBJECTIVE     PMHx:  No past medical history on file.     Social History:    Social History     Tobacco Use    Smoking status: Never    Smokeless tobacco: Never   Substance Use Topics    Alcohol use: Not on file     Comment: rarely       Pertinent Labs:    Lab Results   Component Value Date    LABA1C 6.4 (H) 2023    LABA1C 6.1 (H) 2023    LABA1C 6.6 (H) 2022     Lab Results   Component Value Date    CHOL 238 (H) 2023    TRIG 113 2023    HDL 51 2023     Lab Results

## 2023-08-28 ENCOUNTER — HOSPITAL ENCOUNTER (INPATIENT)
Age: 68
LOS: 5 days | Discharge: HOME | DRG: 280 | End: 2023-09-02
Attending: EMERGENCY MEDICINE | Admitting: INTERNAL MEDICINE
Payer: MEDICARE

## 2023-08-28 ENCOUNTER — APPOINTMENT (OUTPATIENT)
Dept: GENERAL RADIOLOGY | Age: 68
DRG: 280 | End: 2023-08-28
Payer: MEDICARE

## 2023-08-28 DIAGNOSIS — I50.9 HEART FAILURE (HCC): ICD-10-CM

## 2023-08-28 DIAGNOSIS — I50.9 CONGESTIVE HEART FAILURE, UNSPECIFIED HF CHRONICITY, UNSPECIFIED HEART FAILURE TYPE (HCC): Primary | ICD-10-CM

## 2023-08-28 DIAGNOSIS — I50.23 ACUTE ON CHRONIC SYSTOLIC HEART FAILURE (HCC): ICD-10-CM

## 2023-08-28 DIAGNOSIS — I21.4 NSTEMI (NON-ST ELEVATED MYOCARDIAL INFARCTION) (HCC): ICD-10-CM

## 2023-08-28 PROBLEM — J96.01 ACUTE RESPIRATORY FAILURE WITH HYPOXIA (HCC): Status: ACTIVE | Noted: 2023-08-28

## 2023-08-28 PROBLEM — I50.1 PULMONARY EDEMA CARDIAC CAUSE (HCC): Status: ACTIVE | Noted: 2023-08-28

## 2023-08-28 PROBLEM — R60.9 PERIPHERAL EDEMA: Status: ACTIVE | Noted: 2023-08-28

## 2023-08-28 PROBLEM — J81.1 PULMONARY EDEMA: Status: ACTIVE | Noted: 2023-08-28

## 2023-08-28 PROBLEM — R60.0 PERIPHERAL EDEMA: Status: ACTIVE | Noted: 2023-08-28

## 2023-08-28 LAB
ANION GAP SERPL CALCULATED.3IONS-SCNC: 12 MMOL/L (ref 9–17)
ANTI-XA UNFRAC HEPARIN: 0.1 IU/L (ref 0.3–0.7)
BASOPHILS # BLD: 0.04 K/UL (ref 0–0.2)
BASOPHILS NFR BLD: 1 % (ref 0–2)
BNP SERPL-MCNC: 6137 PG/ML
BUN SERPL-MCNC: 22 MG/DL (ref 8–23)
BUN/CREAT SERPL: 20 (ref 9–20)
CALCIUM SERPL-MCNC: 9.2 MG/DL (ref 8.6–10.4)
CHLORIDE SERPL-SCNC: 100 MMOL/L (ref 98–107)
CO2 SERPL-SCNC: 25 MMOL/L (ref 20–31)
CREAT SERPL-MCNC: 1.1 MG/DL (ref 0.7–1.2)
EOSINOPHIL # BLD: 0.12 K/UL (ref 0–0.44)
EOSINOPHILS RELATIVE PERCENT: 2 % (ref 1–4)
ERYTHROCYTE [DISTWIDTH] IN BLOOD BY AUTOMATED COUNT: 13.3 % (ref 11.8–14.4)
GFR SERPL CREATININE-BSD FRML MDRD: >60 ML/MIN/1.73M2
GLUCOSE SERPL-MCNC: 115 MG/DL (ref 70–99)
HCT VFR BLD AUTO: 47.8 % (ref 40.7–50.3)
HGB BLD-MCNC: 15.5 G/DL (ref 13–17)
IMM GRANULOCYTES # BLD AUTO: 0.02 K/UL (ref 0–0.3)
IMM GRANULOCYTES NFR BLD: 0 %
INR PPP: 1.2
LYMPHOCYTES NFR BLD: 1.02 K/UL (ref 1.1–3.7)
LYMPHOCYTES RELATIVE PERCENT: 15 % (ref 24–43)
MAGNESIUM SERPL-MCNC: 2 MG/DL (ref 1.6–2.6)
MCH RBC QN AUTO: 31.6 PG (ref 25.2–33.5)
MCHC RBC AUTO-ENTMCNC: 32.4 G/DL (ref 28.4–34.8)
MCV RBC AUTO: 97.4 FL (ref 82.6–102.9)
MONOCYTES NFR BLD: 0.69 K/UL (ref 0.1–1.2)
MONOCYTES NFR BLD: 10 % (ref 3–12)
NEUTROPHILS NFR BLD: 72 % (ref 36–65)
NEUTS SEG NFR BLD: 4.83 K/UL (ref 1.5–8.1)
NRBC BLD-RTO: 0 PER 100 WBC
PARTIAL THROMBOPLASTIN TIME: 32.2 SEC (ref 23.9–33.8)
PLATELET # BLD AUTO: 198 K/UL (ref 138–453)
PMV BLD AUTO: 10.1 FL (ref 8.1–13.5)
POTASSIUM SERPL-SCNC: 4.1 MMOL/L (ref 3.7–5.3)
PROTHROMBIN TIME: 15.1 SEC (ref 11.5–14.2)
RBC # BLD AUTO: 4.91 M/UL (ref 4.21–5.77)
SODIUM SERPL-SCNC: 137 MMOL/L (ref 135–144)
TROPONIN I SERPL HS-MCNC: 183 NG/L (ref 0–22)
TROPONIN I SERPL HS-MCNC: 197 NG/L (ref 0–22)
WBC OTHER # BLD: 6.7 K/UL (ref 3.5–11.3)

## 2023-08-28 PROCEDURE — 99285 EMERGENCY DEPT VISIT HI MDM: CPT

## 2023-08-28 PROCEDURE — 85730 THROMBOPLASTIN TIME PARTIAL: CPT

## 2023-08-28 PROCEDURE — 2060000000 HC ICU INTERMEDIATE R&B

## 2023-08-28 PROCEDURE — 85520 HEPARIN ASSAY: CPT

## 2023-08-28 PROCEDURE — 36415 COLL VENOUS BLD VENIPUNCTURE: CPT

## 2023-08-28 PROCEDURE — 6360000002 HC RX W HCPCS: Performed by: NURSE PRACTITIONER

## 2023-08-28 PROCEDURE — 99223 1ST HOSP IP/OBS HIGH 75: CPT | Performed by: NURSE PRACTITIONER

## 2023-08-28 PROCEDURE — 80048 BASIC METABOLIC PNL TOTAL CA: CPT

## 2023-08-28 PROCEDURE — 83735 ASSAY OF MAGNESIUM: CPT

## 2023-08-28 PROCEDURE — 2580000003 HC RX 258: Performed by: NURSE PRACTITIONER

## 2023-08-28 PROCEDURE — 84484 ASSAY OF TROPONIN QUANT: CPT

## 2023-08-28 PROCEDURE — 83880 ASSAY OF NATRIURETIC PEPTIDE: CPT

## 2023-08-28 PROCEDURE — 6370000000 HC RX 637 (ALT 250 FOR IP): Performed by: NURSE PRACTITIONER

## 2023-08-28 PROCEDURE — 93005 ELECTROCARDIOGRAM TRACING: CPT | Performed by: EMERGENCY MEDICINE

## 2023-08-28 PROCEDURE — 85025 COMPLETE CBC W/AUTO DIFF WBC: CPT

## 2023-08-28 PROCEDURE — 6360000002 HC RX W HCPCS: Performed by: EMERGENCY MEDICINE

## 2023-08-28 PROCEDURE — 85610 PROTHROMBIN TIME: CPT

## 2023-08-28 PROCEDURE — 71045 X-RAY EXAM CHEST 1 VIEW: CPT

## 2023-08-28 RX ORDER — FUROSEMIDE 10 MG/ML
40 INJECTION INTRAMUSCULAR; INTRAVENOUS 2 TIMES DAILY
Status: DISCONTINUED | OUTPATIENT
Start: 2023-08-28 | End: 2023-08-29

## 2023-08-28 RX ORDER — SODIUM CHLORIDE 9 MG/ML
INJECTION, SOLUTION INTRAVENOUS PRN
Status: DISCONTINUED | OUTPATIENT
Start: 2023-08-28 | End: 2023-09-02 | Stop reason: HOSPADM

## 2023-08-28 RX ORDER — HEPARIN SODIUM 1000 [USP'U]/ML
2000 INJECTION, SOLUTION INTRAVENOUS; SUBCUTANEOUS PRN
Status: DISCONTINUED | OUTPATIENT
Start: 2023-08-28 | End: 2023-08-30 | Stop reason: ALTCHOICE

## 2023-08-28 RX ORDER — CARVEDILOL 3.12 MG/1
3.12 TABLET ORAL 2 TIMES DAILY WITH MEALS
Status: DISCONTINUED | OUTPATIENT
Start: 2023-08-29 | End: 2023-08-29

## 2023-08-28 RX ORDER — HEPARIN SODIUM 10000 [USP'U]/100ML
5-30 INJECTION, SOLUTION INTRAVENOUS CONTINUOUS
Status: DISCONTINUED | OUTPATIENT
Start: 2023-08-28 | End: 2023-08-30

## 2023-08-28 RX ORDER — FUROSEMIDE 10 MG/ML
20 INJECTION INTRAMUSCULAR; INTRAVENOUS ONCE
Status: COMPLETED | OUTPATIENT
Start: 2023-08-28 | End: 2023-08-28

## 2023-08-28 RX ORDER — ENOXAPARIN SODIUM 100 MG/ML
40 INJECTION SUBCUTANEOUS DAILY
Status: CANCELLED | OUTPATIENT
Start: 2023-08-28

## 2023-08-28 RX ORDER — POLYETHYLENE GLYCOL 3350 17 G/17G
17 POWDER, FOR SOLUTION ORAL DAILY PRN
Status: DISCONTINUED | OUTPATIENT
Start: 2023-08-28 | End: 2023-09-02 | Stop reason: HOSPADM

## 2023-08-28 RX ORDER — POTASSIUM CHLORIDE 20 MEQ/1
40 TABLET, EXTENDED RELEASE ORAL PRN
Status: DISCONTINUED | OUTPATIENT
Start: 2023-08-28 | End: 2023-09-02 | Stop reason: HOSPADM

## 2023-08-28 RX ORDER — SODIUM CHLORIDE 0.9 % (FLUSH) 0.9 %
5-40 SYRINGE (ML) INJECTION EVERY 12 HOURS SCHEDULED
Status: DISCONTINUED | OUTPATIENT
Start: 2023-08-28 | End: 2023-09-02 | Stop reason: HOSPADM

## 2023-08-28 RX ORDER — POTASSIUM CHLORIDE 7.45 MG/ML
10 INJECTION INTRAVENOUS PRN
Status: DISCONTINUED | OUTPATIENT
Start: 2023-08-28 | End: 2023-09-02 | Stop reason: HOSPADM

## 2023-08-28 RX ORDER — MAGNESIUM SULFATE IN WATER 40 MG/ML
2000 INJECTION, SOLUTION INTRAVENOUS PRN
Status: DISCONTINUED | OUTPATIENT
Start: 2023-08-28 | End: 2023-09-02 | Stop reason: HOSPADM

## 2023-08-28 RX ORDER — HEPARIN SODIUM 1000 [USP'U]/ML
4000 INJECTION, SOLUTION INTRAVENOUS; SUBCUTANEOUS ONCE
Status: COMPLETED | OUTPATIENT
Start: 2023-08-28 | End: 2023-08-28

## 2023-08-28 RX ORDER — ACETAMINOPHEN 325 MG/1
650 TABLET ORAL EVERY 6 HOURS PRN
Status: DISCONTINUED | OUTPATIENT
Start: 2023-08-28 | End: 2023-09-02 | Stop reason: HOSPADM

## 2023-08-28 RX ORDER — ACETAMINOPHEN 650 MG/1
650 SUPPOSITORY RECTAL EVERY 6 HOURS PRN
Status: DISCONTINUED | OUTPATIENT
Start: 2023-08-28 | End: 2023-09-02 | Stop reason: HOSPADM

## 2023-08-28 RX ORDER — LISINOPRIL 5 MG/1
5 TABLET ORAL DAILY
Status: DISCONTINUED | OUTPATIENT
Start: 2023-08-29 | End: 2023-08-29

## 2023-08-28 RX ORDER — HEPARIN SODIUM 1000 [USP'U]/ML
4000 INJECTION, SOLUTION INTRAVENOUS; SUBCUTANEOUS PRN
Status: DISCONTINUED | OUTPATIENT
Start: 2023-08-28 | End: 2023-08-30 | Stop reason: ALTCHOICE

## 2023-08-28 RX ORDER — ONDANSETRON 4 MG/1
4 TABLET, ORALLY DISINTEGRATING ORAL EVERY 8 HOURS PRN
Status: DISCONTINUED | OUTPATIENT
Start: 2023-08-28 | End: 2023-09-02 | Stop reason: HOSPADM

## 2023-08-28 RX ORDER — METFORMIN HYDROCHLORIDE 500 MG/1
500 TABLET, EXTENDED RELEASE ORAL 2 TIMES DAILY WITH MEALS
Status: DISCONTINUED | OUTPATIENT
Start: 2023-08-28 | End: 2023-08-29

## 2023-08-28 RX ORDER — ONDANSETRON 2 MG/ML
4 INJECTION INTRAMUSCULAR; INTRAVENOUS EVERY 6 HOURS PRN
Status: DISCONTINUED | OUTPATIENT
Start: 2023-08-28 | End: 2023-09-02 | Stop reason: HOSPADM

## 2023-08-28 RX ORDER — SODIUM CHLORIDE 0.9 % (FLUSH) 0.9 %
10 SYRINGE (ML) INJECTION PRN
Status: DISCONTINUED | OUTPATIENT
Start: 2023-08-28 | End: 2023-09-02 | Stop reason: HOSPADM

## 2023-08-28 RX ORDER — ADHESIVE TAPE 3"X 2.3 YD
200 TAPE, NON-MEDICATED TOPICAL DAILY
COMMUNITY

## 2023-08-28 RX ADMIN — METFORMIN HYDROCHLORIDE 500 MG: 500 TABLET, EXTENDED RELEASE ORAL at 21:07

## 2023-08-28 RX ADMIN — HEPARIN SODIUM 11 UNITS/KG/HR: 10000 INJECTION, SOLUTION INTRAVENOUS at 18:38

## 2023-08-28 RX ADMIN — SODIUM CHLORIDE, PRESERVATIVE FREE 10 ML: 5 INJECTION INTRAVENOUS at 23:05

## 2023-08-28 RX ADMIN — FUROSEMIDE 20 MG: 10 INJECTION, SOLUTION INTRAMUSCULAR; INTRAVENOUS at 18:33

## 2023-08-28 RX ADMIN — HEPARIN SODIUM 4000 UNITS: 1000 INJECTION INTRAVENOUS; SUBCUTANEOUS at 18:38

## 2023-08-28 RX ADMIN — FUROSEMIDE 40 MG: 10 INJECTION, SOLUTION INTRAMUSCULAR; INTRAVENOUS at 23:05

## 2023-08-28 ASSESSMENT — ENCOUNTER SYMPTOMS
NAUSEA: 0
VOMITING: 0
COUGH: 0
SINUS PRESSURE: 0
WHEEZING: 0
SINUS PAIN: 0
PHOTOPHOBIA: 0
ABDOMINAL PAIN: 0
CONSTIPATION: 0
DIARRHEA: 0
SHORTNESS OF BREATH: 1

## 2023-08-28 NOTE — H&P
[facility]  Office: 7900  1826, DO, Johnie Matos, DO, Debra Min, DO, Geovany Fregoso Blood, DO, Juan Mccormick MD, Wendy Cardoso MD, Ekaterina Canales MD, Ebbie Meigs, MD,  Bryn Barrientos MD, Susanna Gtz MD, Arsh Sullivan, DO, Kemi Mulligan MD,  Carlos Storey MD, Chiki Arroyo MD, Padilla Ortega DO, Coco Lang MD,  Glendy Sandhu DO, Verner Crandall, MD, Rica Mackay MD, Jonelle De Santiago MD, Chris Smith MD,  Negrito Green MD, Hilary Aguilar MD, Tonya Buckley MD, Monique Irving DO, Davis Edwards MD,  Jackson Mulligan MD, Ramona Byrne, CNP,  Desiree Marcelino, CNP,, Josh Pleitez, CNP,  Mily Rocha, DNP, Leonel Metzger, CNP, Lucille Sanon, CNP, Cady Hoyt, CNP, Dorothy Conde, CNP, Cherie Chen, CNP, Balta Tapia, CNP, Velia Gibson PA-C, Kimani Farley, LAUREN, Ivette Mccabe, CNP, Jeanine Collins, [address]    HISTORY AND PHYSICAL EXAMINATION            Date:   8/28/2023  Patient name:  Peggy Quezada  Date of admission:  8/28/2023  3:28 PM  MRN:   [id number]  Account:  [de-identified]  YOB: 1955  PCP:    Manfred Olszewski, MD  Room:   [place] [address]  Code Status:    Prior    Chief Complaint:     Chief Complaint   Patient presents with    Shortness of Breath     Intermittent SOB w/ ambulating. Sent by PCP after having EKG done today in office       History Obtained From:     patient    History of Present Illness:     Peggy Quezada is a 80 y.o. Non- / non  male who presents with Shortness of Breath (Intermittent SOB w/ ambulating. Sent by PCP after having EKG done today in office)   and is admitted to the hospital for the management of Acute heart failure, unspecified heart failure type ([address]). Patient reports to the emergency department with a 1 month history of shortness of breath and chest tightness.   Patient reports that he can no longer mow his grass without becoming extremely short of breath. Patient also endorses bilateral peripheral edema. Patient has a history of diabetes and a family history of heart disease and multiple generations on both sides his family. Patient is very reluctant to provide any health history as he clearly does not wish to be ill. Interview with patient reveals that he had slow steady progression in his exertional dyspnea and lower extremity edema. In the emergency department patient is found hypoxic requiring supplemental oxygen. Patient has edema and pulmonary edema. PMI is displaced on physical exam of heart tones. Cardiomegaly identified on chest x-ray. Troponins are found to be elevated. NSTEMI with new onset heart failure and known risk factors of age and diabetes patient high risk for ACS. Past Medical History:     History reviewed. No pertinent past medical history. Past Surgical History:     Past Surgical History:   Procedure Laterality Date    TOE AMPUTATION Right 8/19/2022    TOE AMPUTATION HALLUX PULSE LAVAGE performed by Claudine Jimenez DPM at [address]          Medications Prior to Admission:     Prior to Admission medications    Medication Sig Start Date End Date Taking?  Authorizing Provider   metFORMIN (GLUCOPHAGE-XR) 500 MG extended release tablet Take 1 tablet by mouth 2 times daily (with meals)    Historical Provider, MD   Omega-3 Fatty Acids (FISH OIL) 1000 MG capsule Take 1 capsule by mouth daily    Historical Provider, MD   Glucosamine-Chondroit-Vit C-Mn (GLUCOSAMINE 1500 COMPLEX PO) Take 1 tablet by mouth daily    Historical Provider, MD   vitamin C (ASCORBIC ACID) 500 MG tablet Take 1 tablet by mouth daily    Historical Provider, MD   blood glucose monitor strips 1 strip by Other route 2 times daily Test 2 times a day as directed TRUE METRIX BRAND PLEASE 10/5/22   Yovanny Tate MD   blood glucose monitor strips Test 4 times a day & as needed for symptoms of irregular blood with any clinical evidence of superimposed pneumonia. Assessment :      Hospital Problems             Last Modified POA    * (Principal) Acute heart failure, unspecified heart failure type ([address]) 8/28/2023 Yes    New onset type 2 diabetes mellitus ([address]) 8/28/2023 Yes    Peripheral edema 8/28/2023 Yes    Acute respiratory failure with hypoxia ([address]) 8/28/2023 Yes    Pulmonary edema cardiac cause ([address]) 8/28/2023 Yes       Plan:     Patient status inpatient in the  Progressive Unit/Step down    NSTEMI with acute heart failure of unspecified type resulting in acute pulmonary edema with acute respiratory failure and hypoxia and peripheral edema  IV Lasix 40 mg twice daily, beta-blocker, ACE inhibitor, heparin drip  Cardiac echo, further diagnostics to be determined after function is identified  Consult cardiology, [facility] clinic has been contacted and agreed to manage the patient. Private cardiologist was requested by patient's wife who is unfortunately not interventionalists. We will consult both providers. Type 2 diabetes  Continue metformin for now    Consultations:   IP CONSULT TO CARDIOLOGY  IP CONSULT TO INTERNAL MEDICINE  IP CONSULT TO DIETITIAN  IP CONSULT TO CARDIOLOGY    Patient is admitted as inpatient status because of co-morbidities listed above, severity of signs and symptoms as outlined, requirement for current medical therapies and most importantly because of direct risk to patient if care not provided in a hospital setting. Expected length of stay > 48 hours.     WALTER De La Rosa NP  8/28/2023  6:20 PM    Copy sent to Dr. Renée Fan MD

## 2023-08-28 NOTE — ED NOTES
Pt's spO2 79% after ambulating back to room. Pt placed on 3L NC and put on cardiac monitor. SPO2 increased to 95% on 3L NC. Dr. Miranda Tolentino notified.       Ainsley Painter RN  08/28/23 0383

## 2023-08-28 NOTE — ED NOTES
Pt to ed c/o SOB with exertion. Pt states he was sent to ed by pcp after getting ekg done in office today. Pt also c/o LE edema. Pt placed on full cardiac monitor. After ambulating independently to room 19 pts O2 saturation was in 70s on RA. Pt placed on 3L NC.       [employer], [place]  08/28/23 0836

## 2023-08-28 NOTE — ED PROVIDER NOTES
[address] [facility]  Emergency Medicine Department    Pt Name: Mirna Payton  MRN: [id number]  [address] [facility] 1955  Date of evaluation: 8/28/2023  Provider: Zohreh Niño MD    CHIEF COMPLAINT     Chief Complaint   Patient presents with    Shortness of Breath     Intermittent SOB w/ ambulating. Sent by PCP after having EKG done today in office       HISTORY OF PRESENT ILLNESS  (Location/Symptom, Timing/Onset, Context/Setting,Quality, Duration, Modifying Factors, Severity.)   Mirna Payton is a 80 y.o. male who presents to the emergency department complaining of shortness of breath and leg swelling. He was seen by his primary care doctor today and referred here after an abnormal EKG was obtained. He states he has been feeling short of breath with exertion for several months. He then noticed over the past week that his legs seem to be swollen. That is what prompted him to make an appointment with his primary doctor. He denies any chest pain. No known cardiac problems. Nursing Notes were reviewed. ALLERGIES     Patient has no known allergies. CURRENT MEDICATIONS       Current Discharge Medication List        CONTINUE these medications which have NOT CHANGED    Details   magnesium oxide (MAG-OX) 400 (240 Mg) MG tablet Take 0.5 tablets by mouth daily Patient takes 1 time daily.       metFORMIN (GLUCOPHAGE-XR) 500 MG extended release tablet Take 1 tablet by mouth 2 times daily (with meals)      Omega-3 Fatty Acids (FISH OIL) 1000 MG capsule Take 1 capsule by mouth daily      Glucosamine-Chondroit-Vit C-Mn (GLUCOSAMINE 1500 COMPLEX PO) Take 1 tablet by mouth daily      vitamin C (ASCORBIC ACID) 500 MG tablet Take 1 tablet by mouth daily      !! blood glucose monitor strips 1 strip by Other route 2 times daily Test 2 times a day as directed TRUE METRIX BRAND PLEASE  Qty: 100 strip, Refills: 5      !! blood glucose monitor strips Test 4 times a day & as needed for symptoms of irregular blood

## 2023-08-28 NOTE — ED NOTES
ED to inpatient nurses report     Chief Complaint   Patient presents with    Shortness of Breath     Intermittent SOB w/ ambulating.  Sent by PCP after having EKG done today in office      Present to ED from home for sob  LOC: alert and orientated to name, place, date  Vital signs   Vitals:    08/28/23 1615 08/28/23 1630 08/28/23 1645 08/28/23 1811   BP:       Pulse: (!) 102 (!) 101 100    Resp: 13 27 25    Temp:       TempSrc:       SpO2: 94% 94% 95%    Weight:    200 lb (90.7 kg)   Height:    6' 2\" (1.88 m)      Oxygen Baseline RA    Current needs required 3L NC         LDAs:   Peripheral IV 08/28/23 Left Antecubital (Active)     Mobility: Requires assistance * 1  Fall Risk:    Pending ED orders: heparin  Present condition: stable     Consults: IP CONSULT TO CARDIOLOGY  IP CONSULT TO INTERNAL MEDICINE  IP CONSULT TO DIETITIAN  IP CONSULT TO CARDIOLOGY  [x]  Hospitalist  Completed  [x] yes [] no Who: Lena Vo NP  []  Medicine  Completed  [] yes [] No Who:   [x]  Cardiology  Completed  [] yes [] No Who:   []  GI   Completed  [] yes [] No Who:   []  Neurology  Completed  [] yes [] No Who:   []  Nephrology Completed  [] yes [] No Who:    []  Vascular  Completed  [] yes [] No Who:   []  Ortho  Completed  [] yes [] No Who:     []  Surgery  Completed  [] yes [] No Who:    []  Urology  Completed  [] yes [] No Who:    []  CT Surgery Completed  [] yes [] No Who:   []  Podiatry  Completed  [] yes [] No Who:    []  Other    Completed  [] yes [] No Who:  Interventions: labs, lasix, heparin  Important Events: BNP 6137, trop 197        Electronically signed by Luciana Good RN on 8/28/2023 at 6:17 PM       Luciana Good, [address] [place] [address]  08/28/23 1429

## 2023-08-29 ENCOUNTER — APPOINTMENT (OUTPATIENT)
Dept: INTERVENTIONAL RADIOLOGY/VASCULAR | Age: 68
DRG: 280 | End: 2023-08-29
Payer: MEDICARE

## 2023-08-29 ENCOUNTER — APPOINTMENT (OUTPATIENT)
Dept: GENERAL RADIOLOGY | Age: 68
DRG: 280 | End: 2023-08-29
Payer: MEDICARE

## 2023-08-29 ENCOUNTER — APPOINTMENT (OUTPATIENT)
Age: 68
DRG: 280 | End: 2023-08-29
Attending: EMERGENCY MEDICINE
Payer: MEDICARE

## 2023-08-29 PROBLEM — I21.4 NSTEMI (NON-ST ELEVATED MYOCARDIAL INFARCTION) (HCC): Status: ACTIVE | Noted: 2023-08-28

## 2023-08-29 LAB
ANION GAP SERPL CALCULATED.3IONS-SCNC: 10 MMOL/L (ref 9–17)
ANTI-XA UNFRAC HEPARIN: 0.15 IU/L (ref 0.3–0.7)
ANTI-XA UNFRAC HEPARIN: 0.2 IU/L (ref 0.3–0.7)
ANTI-XA UNFRAC HEPARIN: 0.3 IU/L (ref 0.3–0.7)
BASOPHILS # BLD: 0.04 K/UL (ref 0–0.2)
BASOPHILS NFR BLD: 1 % (ref 0–2)
BNP SERPL-MCNC: 5695 PG/ML
BUN SERPL-MCNC: 19 MG/DL (ref 8–23)
BUN/CREAT SERPL: 19 (ref 9–20)
CALCIUM SERPL-MCNC: 9 MG/DL (ref 8.6–10.4)
CHLORIDE SERPL-SCNC: 106 MMOL/L (ref 98–107)
CHOLEST SERPL-MCNC: 137 MG/DL
CHOLESTEROL/HDL RATIO: 3.5
CO2 SERPL-SCNC: 25 MMOL/L (ref 20–31)
CREAT SERPL-MCNC: 1 MG/DL (ref 0.7–1.2)
ECHO AO ROOT DIAM: 3.3 CM
ECHO AO ROOT INDEX: 1.46 CM/M2
ECHO AV PEAK GRADIENT: 3 MMHG
ECHO AV PEAK VELOCITY: 0.9 M/S
ECHO BSA: 2.29 M2
ECHO EST RA PRESSURE: 10 MMHG
ECHO LA AREA 2C: 21.6 CM2
ECHO LA AREA 4C: 24.5 CM2
ECHO LA DIAMETER INDEX: 1.9 CM/M2
ECHO LA DIAMETER: 4.3 CM
ECHO LA MAJOR AXIS: 6.9 CM
ECHO LA MINOR AXIS: 6.1 CM
ECHO LA TO AORTIC ROOT RATIO: 1.3
ECHO LA VOL 2C: 63 ML (ref 18–58)
ECHO LA VOL 4C: 69 ML (ref 18–58)
ECHO LA VOL BP: 69 ML (ref 18–58)
ECHO LA VOL/BSA BIPLANE: 31 ML/M2 (ref 16–34)
ECHO LA VOLUME INDEX A2C: 28 ML/M2 (ref 16–34)
ECHO LA VOLUME INDEX A4C: 31 ML/M2 (ref 16–34)
ECHO LV E' LATERAL VELOCITY: 8 CM/S
ECHO LV E' SEPTAL VELOCITY: 4 CM/S
ECHO LV FRACTIONAL SHORTENING: 16 % (ref 28–44)
ECHO LV INTERNAL DIMENSION DIASTOLE INDEX: 2.21 CM/M2
ECHO LV INTERNAL DIMENSION DIASTOLIC: 5 CM (ref 4.2–5.9)
ECHO LV INTERNAL DIMENSION SYSTOLIC INDEX: 1.86 CM/M2
ECHO LV INTERNAL DIMENSION SYSTOLIC: 4.2 CM
ECHO LV IVSD: 0.7 CM (ref 0.6–1)
ECHO LV MASS 2D: 114.7 G (ref 88–224)
ECHO LV MASS INDEX 2D: 50.8 G/M2 (ref 49–115)
ECHO LV POSTERIOR WALL DIASTOLIC: 0.7 CM (ref 0.6–1)
ECHO LV RELATIVE WALL THICKNESS RATIO: 0.28
ECHO MV A VELOCITY: 0.43 M/S
ECHO MV E DECELERATION TIME (DT): 99 MS
ECHO MV E VELOCITY: 0.9 M/S
ECHO MV E/A RATIO: 2.09
ECHO MV E/E' LATERAL: 11.25
ECHO MV E/E' RATIO (AVERAGED): 16.88
ECHO MV E/E' SEPTAL: 22.5
ECHO RIGHT VENTRICULAR SYSTOLIC PRESSURE (RVSP): 18 MMHG
ECHO TV REGURGITANT MAX VELOCITY: 1.45 M/S
ECHO TV REGURGITANT PEAK GRADIENT: 8 MMHG
EKG ATRIAL RATE: 104 BPM
EKG P AXIS: 46 DEGREES
EKG P-R INTERVAL: 146 MS
EKG Q-T INTERVAL: 372 MS
EKG QRS DURATION: 104 MS
EKG QTC CALCULATION (BAZETT): 489 MS
EKG R AXIS: -33 DEGREES
EKG T AXIS: 52 DEGREES
EKG VENTRICULAR RATE: 104 BPM
EOSINOPHIL # BLD: 0.18 K/UL (ref 0–0.44)
EOSINOPHILS RELATIVE PERCENT: 3 % (ref 1–4)
ERYTHROCYTE [DISTWIDTH] IN BLOOD BY AUTOMATED COUNT: 13.3 % (ref 11.8–14.4)
GFR SERPL CREATININE-BSD FRML MDRD: >60 ML/MIN/1.73M2
GLUCOSE SERPL-MCNC: 121 MG/DL (ref 70–99)
HCT VFR BLD AUTO: 43.6 % (ref 40.7–50.3)
HDLC SERPL-MCNC: 39 MG/DL
HGB BLD-MCNC: 14.2 G/DL (ref 13–17)
IMM GRANULOCYTES # BLD AUTO: 0.01 K/UL (ref 0–0.3)
IMM GRANULOCYTES NFR BLD: 0 %
LDLC SERPL CALC-MCNC: 86 MG/DL (ref 0–130)
LYMPHOCYTES NFR BLD: 1.11 K/UL (ref 1.1–3.7)
LYMPHOCYTES RELATIVE PERCENT: 21 % (ref 24–43)
MAGNESIUM SERPL-MCNC: 2.1 MG/DL (ref 1.6–2.6)
MCH RBC QN AUTO: 31.5 PG (ref 25.2–33.5)
MCHC RBC AUTO-ENTMCNC: 32.6 G/DL (ref 28.4–34.8)
MCV RBC AUTO: 96.7 FL (ref 82.6–102.9)
MONOCYTES NFR BLD: 0.58 K/UL (ref 0.1–1.2)
MONOCYTES NFR BLD: 11 % (ref 3–12)
NEUTROPHILS NFR BLD: 64 % (ref 36–65)
NEUTS SEG NFR BLD: 3.33 K/UL (ref 1.5–8.1)
NRBC BLD-RTO: 0 PER 100 WBC
PLATELET # BLD AUTO: 180 K/UL (ref 138–453)
PMV BLD AUTO: 9.8 FL (ref 8.1–13.5)
POTASSIUM SERPL-SCNC: 4.2 MMOL/L (ref 3.7–5.3)
RBC # BLD AUTO: 4.51 M/UL (ref 4.21–5.77)
SODIUM SERPL-SCNC: 141 MMOL/L (ref 135–144)
TRIGL SERPL-MCNC: 60 MG/DL
TROPONIN I SERPL HS-MCNC: 207 NG/L (ref 0–22)
TSH SERPL DL<=0.05 MIU/L-ACNC: 4.15 UIU/ML (ref 0.3–5)
WBC OTHER # BLD: 5.3 K/UL (ref 3.5–11.3)

## 2023-08-29 PROCEDURE — 32555 ASPIRATE PLEURA W/ IMAGING: CPT

## 2023-08-29 PROCEDURE — 71046 X-RAY EXAM CHEST 2 VIEWS: CPT

## 2023-08-29 PROCEDURE — 84484 ASSAY OF TROPONIN QUANT: CPT

## 2023-08-29 PROCEDURE — 85025 COMPLETE CBC W/AUTO DIFF WBC: CPT

## 2023-08-29 PROCEDURE — 2060000000 HC ICU INTERMEDIATE R&B

## 2023-08-29 PROCEDURE — 6370000000 HC RX 637 (ALT 250 FOR IP): Performed by: STUDENT IN AN ORGANIZED HEALTH CARE EDUCATION/TRAINING PROGRAM

## 2023-08-29 PROCEDURE — 93306 TTE W/DOPPLER COMPLETE: CPT

## 2023-08-29 PROCEDURE — 2709999900 IR GUIDED THORACENTESIS PLEURAL

## 2023-08-29 PROCEDURE — 83735 ASSAY OF MAGNESIUM: CPT

## 2023-08-29 PROCEDURE — 36415 COLL VENOUS BLD VENIPUNCTURE: CPT

## 2023-08-29 PROCEDURE — 85520 HEPARIN ASSAY: CPT

## 2023-08-29 PROCEDURE — 6360000002 HC RX W HCPCS: Performed by: NURSE PRACTITIONER

## 2023-08-29 PROCEDURE — 6370000000 HC RX 637 (ALT 250 FOR IP): Performed by: NURSE PRACTITIONER

## 2023-08-29 PROCEDURE — 0W993ZZ DRAINAGE OF RIGHT PLEURAL CAVITY, PERCUTANEOUS APPROACH: ICD-10-PCS | Performed by: INTERNAL MEDICINE

## 2023-08-29 PROCEDURE — 83880 ASSAY OF NATRIURETIC PEPTIDE: CPT

## 2023-08-29 PROCEDURE — 6360000002 HC RX W HCPCS: Performed by: INTERNAL MEDICINE

## 2023-08-29 PROCEDURE — 99232 SBSQ HOSP IP/OBS MODERATE 35: CPT | Performed by: STUDENT IN AN ORGANIZED HEALTH CARE EDUCATION/TRAINING PROGRAM

## 2023-08-29 PROCEDURE — 80048 BASIC METABOLIC PNL TOTAL CA: CPT

## 2023-08-29 PROCEDURE — 80061 LIPID PANEL: CPT

## 2023-08-29 PROCEDURE — 84443 ASSAY THYROID STIM HORMONE: CPT

## 2023-08-29 PROCEDURE — 2580000003 HC RX 258: Performed by: NURSE PRACTITIONER

## 2023-08-29 PROCEDURE — 6370000000 HC RX 637 (ALT 250 FOR IP): Performed by: INTERNAL MEDICINE

## 2023-08-29 RX ORDER — LISINOPRIL 2.5 MG/1
2.5 TABLET ORAL 2 TIMES DAILY
Status: DISCONTINUED | OUTPATIENT
Start: 2023-08-29 | End: 2023-09-01

## 2023-08-29 RX ORDER — FUROSEMIDE 10 MG/ML
20 INJECTION INTRAMUSCULAR; INTRAVENOUS ONCE
Status: DISCONTINUED | OUTPATIENT
Start: 2023-08-29 | End: 2023-08-29

## 2023-08-29 RX ORDER — ASPIRIN 81 MG/1
81 TABLET ORAL DAILY
Status: DISCONTINUED | OUTPATIENT
Start: 2023-08-29 | End: 2023-09-02 | Stop reason: HOSPADM

## 2023-08-29 RX ORDER — LISINOPRIL 2.5 MG/1
2.5 TABLET ORAL DAILY
Status: DISCONTINUED | OUTPATIENT
Start: 2023-08-30 | End: 2023-08-29

## 2023-08-29 RX ORDER — QUINIDINE GLUCONATE 324 MG
2 TABLET, EXTENDED RELEASE ORAL
COMMUNITY

## 2023-08-29 RX ORDER — FUROSEMIDE 10 MG/ML
20 INJECTION INTRAMUSCULAR; INTRAVENOUS 2 TIMES DAILY
Status: DISCONTINUED | OUTPATIENT
Start: 2023-08-29 | End: 2023-08-31

## 2023-08-29 RX ORDER — ROSUVASTATIN CALCIUM 10 MG/1
5 TABLET, COATED ORAL NIGHTLY
Status: DISCONTINUED | OUTPATIENT
Start: 2023-08-29 | End: 2023-09-02 | Stop reason: HOSPADM

## 2023-08-29 RX ADMIN — ROSUVASTATIN CALCIUM 5 MG: 10 TABLET, FILM COATED ORAL at 20:12

## 2023-08-29 RX ADMIN — CARVEDILOL 3.12 MG: 3.12 TABLET, FILM COATED ORAL at 08:44

## 2023-08-29 RX ADMIN — HEPARIN SODIUM 13 UNITS/KG/HR: 10000 INJECTION, SOLUTION INTRAVENOUS at 20:11

## 2023-08-29 RX ADMIN — ASPIRIN 81 MG: 81 TABLET, COATED ORAL at 13:08

## 2023-08-29 RX ADMIN — HEPARIN SODIUM 13 UNITS/KG/HR: 10000 INJECTION, SOLUTION INTRAVENOUS at 02:38

## 2023-08-29 RX ADMIN — Medication 12.5 MG: at 23:31

## 2023-08-29 RX ADMIN — FUROSEMIDE 40 MG: 10 INJECTION, SOLUTION INTRAMUSCULAR; INTRAVENOUS at 08:43

## 2023-08-29 RX ADMIN — FUROSEMIDE 20 MG: 10 INJECTION, SOLUTION INTRAMUSCULAR; INTRAVENOUS at 19:15

## 2023-08-29 RX ADMIN — SODIUM CHLORIDE, PRESERVATIVE FREE 10 ML: 5 INJECTION INTRAVENOUS at 08:44

## 2023-08-29 RX ADMIN — HEPARIN SODIUM 2000 UNITS: 1000 INJECTION INTRAVENOUS; SUBCUTANEOUS at 02:31

## 2023-08-29 RX ADMIN — SODIUM CHLORIDE, PRESERVATIVE FREE 10 ML: 5 INJECTION INTRAVENOUS at 20:16

## 2023-08-29 ASSESSMENT — ENCOUNTER SYMPTOMS
WHEEZING: 0
ABDOMINAL PAIN: 0
BACK PAIN: 0
COUGH: 0
SHORTNESS OF BREATH: 1
CHEST TIGHTNESS: 0

## 2023-08-29 NOTE — CARE COORDINATION
Case Management Assessment  Initial Evaluation    Date/Time of Evaluation: 8/29/2023 12:45 PM  Assessment Completed by: Tiburcio Schirmer, RN    If patient is discharged prior to next notation, then this note serves as note for discharge by case management. Patient Name: Falguni Montoya                   YOB: 1955  Diagnosis: Acute heart failure, unspecified heart failure type ([address] [facility]) [I50.9]  Congestive heart failure, unspecified HF chronicity, unspecified heart failure type [facility]) [I50.9]                   Date / Time: 8/28/2023  3:28 PM    Patient Admission Status: Inpatient   Readmission Risk (Low < 19, Mod (19-27), High > 27): Readmission Risk Score: 6.5    Current PCP: Sarah Alcocer MD  PCP verified by CM? Yes    Chart Reviewed: Yes      History Provided by: Patient  Patient Orientation: Alert and Oriented    Patient Cognition: Alert    Hospitalization in the last 30 days (Readmission):  Yes    If yes, Readmission Assessment in CM Navigator will be completed. Advance Directives:      Code Status: Full Code   Patient's Primary Decision Maker is: Named in Scanned ACP Document    Primary Decision Maker: Sabrinavonda Ahmadi - [phone]    Discharge Planning:    Patient lives with: Spouse/Significant Other Type of Home: House  Primary Care Giver: Self  Patient Support Systems include: Spouse/Significant Other   Current Financial resources: Medicare  Current community resources: None  Current services prior to admission: Durable Medical Equipment            Current DME: Alejandro Form            Type of Home Care services:  None    ADLS  Prior functional level: Independent in ADLs/IADLs  Current functional level: Independent in ADLs/IADLs    PT AM-PAC:   /24  OT AM-PAC:   /24    Family can provide assistance at DC: Yes  Would you like Case Management to discuss the discharge plan with any other family members/significant others, and if so, who?  Yes (wife)  Plans to Return to Present Housing:

## 2023-08-29 NOTE — ACP (ADVANCE CARE PLANNING)
Advance Care Planning     Advance Care Planning Activator (Inpatient)  Conversation Note      Date of ACP Conversation: 8/29/2023     Conversation Conducted with: Patient with Decision Making Capacity    ACP Activator: Kayode Talley RN          Health Care Decision Maker:     Current Designated Health Care Decision Maker:     Primary Decision Maker: Bela Dye - [phone]  Click here to complete Healthcare Decision Makers including section of the Healthcare Decision Maker Relationship (ie \"Primary\")  Today we documented Decision Maker(s) consistent with Legal Next of Kin hierarchy. Care Preferences    Ventilation: \"If you were in your present state of health and suddenly became very ill and were unable to breathe on your own, what would your preference be about the use of a ventilator (breathing machine) if it were available to you? \"      Would the patient desire the use of ventilator (breathing machine)?: yes    \"If your health worsens and it becomes clear that your chance of recovery is unlikely, what would your preference be about the use of a ventilator (breathing machine) if it were available to you? \"     Would the patient desire the use of ventilator (breathing machine)?: No      Resuscitation  \"CPR works best to restart the heart when there is a sudden event, like a heart attack, in someone who is otherwise healthy. Unfortunately, CPR does not typically restart the heart for people who have serious health conditions or who are very sick. \"    \"In the event your heart stopped as a result of an underlying serious health condition, would you want attempts to be made to restart your heart (answer \"yes\" for attempt to resuscitate) or would you prefer a natural death (answer \"no\" for do not attempt to resuscitate)? \" yes       [] Yes   [] No   Educated Patient / Chris Sails regarding differences between Advance Directives and portable DNR orders.     Length of ACP Conversation in minutes:

## 2023-08-29 NOTE — PROGRESS NOTES
Anti Xa non-therapeutic calls for bolus and increase by 2 units. Patient to have thoracentesis instructed to turn off heparin gtt by Dr. Buzz Stanley and follow instructions from IR on when to restart.

## 2023-08-29 NOTE — PROGRESS NOTES
Nutrition Education    Educated on heart failure education, weight monitoring, fluid restriction and sodium restriction. Learners: Patient  Readiness: Eager  Method: Explanation and Handout  Response: Verbalizes Understanding  Contact name and number provided.     Vannesa Kendrick RD  Contact Number: [phone]

## 2023-08-29 NOTE — FLOWSHEET NOTE
Right side thoracentesis completed 1500 ml of cloudy vilma pleural fluid removed pt tolerated procedure well dressing applied to procedure site pt returned to room in nad

## 2023-08-29 NOTE — PROGRESS NOTES
[] Medication Reconciliation was completed and the patient's home medication list was verified. The Med List Status is \"Complete\". The following sources were used to assist with Medication Reconciliation:    [] Patient had a list of medications which was transcribed into the EHR. [] Patient provided bottles of their medications    [x] Home medications reviewed and confirmed with patient    [] Contacted patient's pharmacy to confirm home medications    [] Contacted patient's physician office to confirm home medications    [] Medical Records from another facility and/or Care Everywhere were reviewed      [] There are one or more home medications that need clarification before Medication Reconciliation can be completed. The Med List Status has been marked as In Progress. To assist with Home Medication Reconciliation the following actions have been taken:    [] Pharmacy medication reconciliation service requested.  (Note: This can be done by sending a Perfect Serve message to [facility] Pharmacist or by Mayte Hands [phone].)  [] Family requested to bring medications into the hospital  [] Family requested to call hospital with medication list  [] Message left with physician office  [] Request for medical records made to n/a  [] Other n/a

## 2023-08-29 NOTE — PROGRESS NOTES
Bedside report given to oncoming nurse Jaclyn Farmer. Patient Heparin gtts at 11units signed off to oncoming nurse as well.

## 2023-08-29 NOTE — CONSULTS
81 y/o male with h/o DM, presented to the ED complaining of of worsening exertional dyspnea and chest tightness. Pt had a severe episode of chest pain and dyspnea associated with weakness, nausea and vomiting 1 mth ago. Pt was found to be in fluid overload, with elevated troponin. He was placed on heparin and diuretics with improvement in symptoms. PMHx  DM    Meds  Metformin    Allegies  NKDA    SHx  No tob or Etoh use    FHx  No CAD    ROS  As per HPI    On PE  Comfortable , anxious, NAD  NSR 80s, /50, 22  Mild JVD  no bruits  Lungs have decrease BS at the bases 1/3 up  bilaterally. RRR S3  +HJR  Moderate peripheral edema    EKG  NSR  RBBB, low voltage. Troponin [phone]  Normal renal function  Normal Hgb and plt  CXR  bilateral pleural effusion with vascular congestion  ECHO  severe LV dysfunction with WMA c/w infarct. A/p    NSTEMI  with LV dysfunction and CHF. Continue heparin. Will start low dose lopressor and lisinopril. Diurese gently. Will ask IR to help us with thoracentesis to speed up improvement of symptoms and to accommodate cardiac cath tomorrow.    Will start statin    DM as per admitting team.

## 2023-08-29 NOTE — BRIEF OP NOTE
Brief Postoperative Note    Newton Contreras  YOB: 1955  [id number]    Pre-operative Diagnosis: Pleural effusion    Post-operative Diagnosis: Same    Procedure:  Therapeutic right thoracentesis    Anesthesia: Local    Surgeons/Assistants: Antonia Mcgrath MD     Estimated Blood Loss: minimal    Complications: none immediate    Specimens: were not obtained    Findings: (please see full note in imaging section.)    Electronically signed by Antonia Mcgrath MD on 8/29/2023 at 6:04 PM

## 2023-08-29 NOTE — PROGRESS NOTES
Patient receives Sacrament of the Sick (anointing) from [employer] Â® .    [address] [place] will follow as needed. (writer charting for [employer].)   PT: was sleeping , PT: anointed for the sacrament of the sick by Fr. Arriola Comment     08/29/23 3265   Encounter Summary   Encounter Overview/Reason  Rigoberto Alex  (PT: anointed, PT: sleeping)   Service Provided For: Patient   Referral/Consult From: Rounding   Last Encounter  08/29/23   Rituals, Rites and Sacraments   Type Sacrament of Sick; Anointing

## 2023-08-29 NOTE — PROGRESS NOTES
Transitions of Care Pharmacy Service   Medication Review    The patient's list of current home medications has been reviewed. Source(s) of information: spoke to patient and sure scripts     Based on information provided by the above source(s), I have updated the patient's home med list as described below. I changed or updated the following medications on the patient's home medication list:  Discontinued Magnesium Oxide -Mg Supplement 400 MG TABS  CINNAMON PO  Glucosamine-Chdt-Vit C-Mn(GLUCOSAMINE 1500 CPLX      Added Glucosamine-Chondroit-Vit C-Mn (GLUCOSAMINE-CHONDROITIN) TABS  Magnesium Oxide -Mg Supplement 200 MG TABS     Adjusted   vitamin C (ASCORBIC ACID) 500 MG tablet     Other Notes Patient reported that he don't eat often and only takes 1 metFORMIN (GLUCOPHAGE-XR) 500 MG extended release tablet daily            Please feel free to call me with any questions about this encounter. Thank you. This note will be reviewed and co-signed by the Transitions of Care Pharmacist. The pharmacist will review inpatient orders and contact the physician about any discrepancies.     Shawna Jones, pharmacy technician  Transitions of Care Pharmacy Service  Phone:  [phone]  Fax: [phone]      Electronically signed by Shawna Jones on 8/29/2023 at 5:22 PM       Prior to Admission medications    Medication Sig   Glucosamine-Chondroit-Vit C-Mn (GLUCOSAMINE-CHONDROITIN) TABS Take 2 tablets by mouth Twice a Week   Magnesium Oxide -Mg   Supplement 200 MG TABS Take 200 mg by mouth daily   metFORMIN (GLUCOPHAGE-XR) 500 MG extended release tablet Take 1 tablet by mouth 2 times daily (with meals)   Omega-3 Fatty Acids (FISH OIL) 1000 MG capsule Take 1 capsule by mouth once a week   vitamin C (ASCORBIC ACID) 500 MG tablet Take 2 tablets by mouth daily

## 2023-08-29 NOTE — PLAN OF CARE
Problem: Safety - Adult  Goal: Free from fall injury  Outcome: Progressing     Problem: Respiratory - Adult  Goal: Achieves optimal ventilation and oxygenation  Outcome: Progressing     Problem: Metabolic/Fluid and Electrolytes - Adult  Goal: Electrolytes maintained within normal limits  Outcome: Progressing     Problem: Metabolic/Fluid and Electrolytes - Adult  Goal: Glucose maintained within prescribed range  Outcome: Progressing     Problem: Musculoskeletal - Adult  Goal: Return mobility to safest level of function  Outcome: Progressing

## 2023-08-29 NOTE — PLAN OF CARE
Problem: Discharge Planning  Goal: Discharge to home or other facility with appropriate resources  Outcome: Progressing     Problem: Safety - Adult  Goal: Free from fall injury  8/29/2023 0724 by Clau Hancock RN  Outcome: Progressing  8/28/2023 2353 by Mi Rosado RN  Outcome: Progressing     Problem: Respiratory - Adult  Goal: Achieves optimal ventilation and oxygenation  8/29/2023 0724 by Clau Hancock RN  Outcome: Progressing  8/28/2023 2353 by Mi Rosado RN  Outcome: Progressing     Problem: Metabolic/Fluid and Electrolytes - Adult  Goal: Electrolytes maintained within normal limits  8/29/2023 0724 by Clau Hancock RN  Outcome: Progressing  8/28/2023 2353 by Mi Rosado RN  Outcome: Progressing  Goal: Glucose maintained within prescribed range  8/29/2023 0724 by Clau Hancock RN  Outcome: Progressing  8/28/2023 2353 by Mi Rosado RN  Outcome: Progressing     Problem: Musculoskeletal - Adult  Goal: Return mobility to safest level of function  8/29/2023 0724 by Clau Hancock RN  Outcome: Progressing  8/28/2023 2353 by Mi Rosado RN  Outcome: Progressing

## 2023-08-29 NOTE — PLAN OF CARE
Patient alert and oriented and anxious. Patient eager for cardiology to arrive. Patient became agitated and wanted to leave then very apologetic afterward. Once Dr. Sreekanth Calderon arrived and patient had some answers very compliant with plan of care. Patient had right thoracentesis with 1500 ml off. Plan is for heart cath tomorrow and NPO at midnight. Problem: Discharge Planning  Goal: Discharge to home or other facility with appropriate resources  8/29/2023 1837 by Guevara Conti RN  Outcome: Progressing  8/29/2023 0724 by Lorie Botello RN  Outcome: Progressing     Problem: Safety - Adult  Goal: Free from fall injury  8/29/2023 1837 by Guevara Conti RN  Outcome: Progressing  Note: Siderails up x 2  Hourly rounding. Call light in reach. Instructed to call for assist before attempting out of bed. Remains free from falls and accidental injury at this time. Floor free from obstacles, and bed is locked and in lowest position. Adequate lighting provided. Bed alarm on. Fall sticker on wristband.  Fall Sign posted in doorway   8/29/2023 0724 by Lorie Botello RN  Outcome: Progressing     Problem: Respiratory - Adult  Goal: Achieves optimal ventilation and oxygenation  8/29/2023 1837 by Guevara Conti RN  Outcome: Progressing  Flowsheets (Taken 8/29/2023 1837)  Achieves optimal ventilation and oxygenation:   Assess for changes in respiratory status   Assess for changes in mentation and behavior   Position to facilitate oxygenation and minimize respiratory effort   Oxygen supplementation based on oxygen saturation or arterial blood gases   Assess and instruct to report shortness of breath or any respiratory difficulty   Respiratory therapy support as indicated  8/29/2023 0724 by Lorie Botello RN  Outcome: Progressing

## 2023-08-30 LAB
ANION GAP SERPL CALCULATED.3IONS-SCNC: 10 MMOL/L (ref 9–17)
ANTI-XA UNFRAC HEPARIN: 0.18 IU/L (ref 0.3–0.7)
ANTI-XA UNFRAC HEPARIN: 0.47 IU/L (ref 0.3–0.7)
BUN SERPL-MCNC: 21 MG/DL (ref 8–23)
BUN/CREAT SERPL: 19 (ref 9–20)
CALCIUM SERPL-MCNC: 8.9 MG/DL (ref 8.6–10.4)
CHLORIDE SERPL-SCNC: 100 MMOL/L (ref 98–107)
CO2 SERPL-SCNC: 25 MMOL/L (ref 20–31)
CREAT SERPL-MCNC: 1.1 MG/DL (ref 0.7–1.2)
ECHO BSA: 2.29 M2
ERYTHROCYTE [DISTWIDTH] IN BLOOD BY AUTOMATED COUNT: 13.4 % (ref 11.8–14.4)
GFR SERPL CREATININE-BSD FRML MDRD: >60 ML/MIN/1.73M2
GLUCOSE SERPL-MCNC: 149 MG/DL (ref 70–99)
HCT VFR BLD AUTO: 42 % (ref 40.7–50.3)
HGB BLD-MCNC: 13.4 G/DL (ref 13–17)
MAGNESIUM SERPL-MCNC: 2 MG/DL (ref 1.6–2.6)
MCH RBC QN AUTO: 31.4 PG (ref 25.2–33.5)
MCHC RBC AUTO-ENTMCNC: 31.9 G/DL (ref 28.4–34.8)
MCV RBC AUTO: 98.4 FL (ref 82.6–102.9)
NRBC BLD-RTO: 0 PER 100 WBC
PLATELET # BLD AUTO: 173 K/UL (ref 138–453)
PMV BLD AUTO: 10.2 FL (ref 8.1–13.5)
POTASSIUM SERPL-SCNC: 3.8 MMOL/L (ref 3.7–5.3)
RBC # BLD AUTO: 4.27 M/UL (ref 4.21–5.77)
SODIUM SERPL-SCNC: 135 MMOL/L (ref 135–144)
WBC OTHER # BLD: 6 K/UL (ref 3.5–11.3)

## 2023-08-30 PROCEDURE — 6360000004 HC RX CONTRAST MEDICATION

## 2023-08-30 PROCEDURE — 6360000002 HC RX W HCPCS

## 2023-08-30 PROCEDURE — 80048 BASIC METABOLIC PNL TOTAL CA: CPT

## 2023-08-30 PROCEDURE — 97166 OT EVAL MOD COMPLEX 45 MIN: CPT

## 2023-08-30 PROCEDURE — 85027 COMPLETE CBC AUTOMATED: CPT

## 2023-08-30 PROCEDURE — 97530 THERAPEUTIC ACTIVITIES: CPT

## 2023-08-30 PROCEDURE — 6360000004 HC RX CONTRAST MEDICATION: Performed by: INTERNAL MEDICINE

## 2023-08-30 PROCEDURE — 83735 ASSAY OF MAGNESIUM: CPT

## 2023-08-30 PROCEDURE — 6370000000 HC RX 637 (ALT 250 FOR IP): Performed by: STUDENT IN AN ORGANIZED HEALTH CARE EDUCATION/TRAINING PROGRAM

## 2023-08-30 PROCEDURE — 99152 MOD SED SAME PHYS/QHP 5/>YRS: CPT | Performed by: INTERNAL MEDICINE

## 2023-08-30 PROCEDURE — 2500000003 HC RX 250 WO HCPCS: Performed by: INTERNAL MEDICINE

## 2023-08-30 PROCEDURE — 93458 L HRT ARTERY/VENTRICLE ANGIO: CPT | Performed by: INTERNAL MEDICINE

## 2023-08-30 PROCEDURE — B2111ZZ FLUOROSCOPY OF MULTIPLE CORONARY ARTERIES USING LOW OSMOLAR CONTRAST: ICD-10-PCS | Performed by: INTERNAL MEDICINE

## 2023-08-30 PROCEDURE — 36415 COLL VENOUS BLD VENIPUNCTURE: CPT

## 2023-08-30 PROCEDURE — 6360000002 HC RX W HCPCS: Performed by: INTERNAL MEDICINE

## 2023-08-30 PROCEDURE — 97535 SELF CARE MNGMENT TRAINING: CPT

## 2023-08-30 PROCEDURE — 2500000003 HC RX 250 WO HCPCS

## 2023-08-30 PROCEDURE — B2151ZZ FLUOROSCOPY OF LEFT HEART USING LOW OSMOLAR CONTRAST: ICD-10-PCS | Performed by: INTERNAL MEDICINE

## 2023-08-30 PROCEDURE — 85520 HEPARIN ASSAY: CPT

## 2023-08-30 PROCEDURE — 2580000003 HC RX 258: Performed by: INTERNAL MEDICINE

## 2023-08-30 PROCEDURE — 99232 SBSQ HOSP IP/OBS MODERATE 35: CPT | Performed by: STUDENT IN AN ORGANIZED HEALTH CARE EDUCATION/TRAINING PROGRAM

## 2023-08-30 PROCEDURE — 2709999900 HC NON-CHARGEABLE SUPPLY: Performed by: INTERNAL MEDICINE

## 2023-08-30 PROCEDURE — 6360000002 HC RX W HCPCS: Performed by: NURSE PRACTITIONER

## 2023-08-30 PROCEDURE — 6370000000 HC RX 637 (ALT 250 FOR IP): Performed by: INTERNAL MEDICINE

## 2023-08-30 PROCEDURE — 4A023N7 MEASUREMENT OF CARDIAC SAMPLING AND PRESSURE, LEFT HEART, PERCUTANEOUS APPROACH: ICD-10-PCS | Performed by: INTERNAL MEDICINE

## 2023-08-30 PROCEDURE — 2060000000 HC ICU INTERMEDIATE R&B

## 2023-08-30 RX ORDER — CLOPIDOGREL BISULFATE 75 MG/1
75 TABLET ORAL DAILY
Status: DISCONTINUED | OUTPATIENT
Start: 2023-08-30 | End: 2023-09-02 | Stop reason: HOSPADM

## 2023-08-30 RX ORDER — SODIUM CHLORIDE 9 MG/ML
INJECTION, SOLUTION INTRAVENOUS PRN
Status: DISCONTINUED | OUTPATIENT
Start: 2023-08-30 | End: 2023-09-02 | Stop reason: HOSPADM

## 2023-08-30 RX ORDER — ACETAMINOPHEN 325 MG/1
650 TABLET ORAL EVERY 4 HOURS PRN
Status: DISCONTINUED | OUTPATIENT
Start: 2023-08-30 | End: 2023-08-30 | Stop reason: SDUPTHER

## 2023-08-30 RX ORDER — SODIUM CHLORIDE 0.9 % (FLUSH) 0.9 %
5-40 SYRINGE (ML) INJECTION EVERY 12 HOURS SCHEDULED
Status: DISCONTINUED | OUTPATIENT
Start: 2023-08-30 | End: 2023-09-02 | Stop reason: HOSPADM

## 2023-08-30 RX ORDER — FENTANYL CITRATE 50 UG/ML
INJECTION, SOLUTION INTRAMUSCULAR; INTRAVENOUS PRN
Status: DISCONTINUED | OUTPATIENT
Start: 2023-08-30 | End: 2023-08-30 | Stop reason: HOSPADM

## 2023-08-30 RX ORDER — MIDAZOLAM HYDROCHLORIDE 1 MG/ML
INJECTION INTRAMUSCULAR; INTRAVENOUS PRN
Status: DISCONTINUED | OUTPATIENT
Start: 2023-08-30 | End: 2023-08-30 | Stop reason: HOSPADM

## 2023-08-30 RX ORDER — SODIUM CHLORIDE 0.9 % (FLUSH) 0.9 %
5-40 SYRINGE (ML) INJECTION PRN
Status: DISCONTINUED | OUTPATIENT
Start: 2023-08-30 | End: 2023-09-02 | Stop reason: HOSPADM

## 2023-08-30 RX ORDER — SODIUM CHLORIDE 9 MG/ML
INJECTION, SOLUTION INTRAVENOUS CONTINUOUS PRN
Status: COMPLETED | OUTPATIENT
Start: 2023-08-30 | End: 2023-08-30

## 2023-08-30 RX ADMIN — HEPARIN SODIUM 15 UNITS/KG/HR: 10000 INJECTION, SOLUTION INTRAVENOUS at 13:49

## 2023-08-30 RX ADMIN — Medication 12.5 MG: at 10:18

## 2023-08-30 RX ADMIN — Medication 12.5 MG: at 22:05

## 2023-08-30 RX ADMIN — SODIUM CHLORIDE, PRESERVATIVE FREE 10 ML: 5 INJECTION INTRAVENOUS at 22:06

## 2023-08-30 RX ADMIN — FUROSEMIDE 20 MG: 10 INJECTION, SOLUTION INTRAMUSCULAR; INTRAVENOUS at 22:06

## 2023-08-30 RX ADMIN — LISINOPRIL 2.5 MG: 2.5 TABLET ORAL at 10:18

## 2023-08-30 RX ADMIN — LISINOPRIL 2.5 MG: 2.5 TABLET ORAL at 22:05

## 2023-08-30 RX ADMIN — CLOPIDOGREL BISULFATE 75 MG: 75 TABLET ORAL at 22:06

## 2023-08-30 RX ADMIN — FUROSEMIDE 20 MG: 10 INJECTION, SOLUTION INTRAMUSCULAR; INTRAVENOUS at 10:20

## 2023-08-30 RX ADMIN — ASPIRIN 81 MG: 81 TABLET, COATED ORAL at 10:18

## 2023-08-30 RX ADMIN — ROSUVASTATIN CALCIUM 5 MG: 10 TABLET, FILM COATED ORAL at 22:05

## 2023-08-30 RX ADMIN — HEPARIN SODIUM 2000 UNITS: 1000 INJECTION INTRAVENOUS; SUBCUTANEOUS at 02:35

## 2023-08-30 ASSESSMENT — ENCOUNTER SYMPTOMS
CHEST TIGHTNESS: 0
SHORTNESS OF BREATH: 1
ABDOMINAL PAIN: 0
COUGH: 0
WHEEZING: 0
BACK PAIN: 0

## 2023-08-30 NOTE — CARE COORDINATION
Discharge planning    Chart reviewed. Patient independent pta. He lives with wife. Independent. Drives. DME RW and cane but uses neither at this time He has had home care in past with iv [facility], [facility]. He has never been to a snf.      Right thoracentesis per IR yesterday yielded 1500 ml cardiology planning on cardiac cath today    Will follow for any post cath needs

## 2023-08-30 NOTE — PROGRESS NOTES
[address] NOTE    Room # 8938/5337-26   Name: Emmie Golden            [employer]ism: [facility]     Reason for visit: Routine    I visited the patient. Admit Date & Time: 8/28/2023  3:28 PM    Assessment:  Emmie Golden is a 80 y.o. male in the hospital because he has heart failure. Upon entering the room the patient was found resting in bed. He is tired but feeling better. Intervention:  I introduced myself and my title as  I offered space for patient  to express feelings, needs, and concerns and provided a ministry presence.  offered emotional support and active listening. Outcome:  Patient calm and at rest.    Plan:  Chaplains will remain available to offer spiritual and emotional support as needed. Electronically signed by Veena Bonilla on 8/30/2023 at 5:56 PM.  [address]     08/30/23 6143   Encounter Summary   Service Provided For: Patient; Family   Referral/Consult From: [facility] Spouse   Last Encounter  08/30/23   Complexity of Encounter Moderate   Begin Time 1700   End Time  1720   Total Time Calculated 20 min   Spiritual/Emotional needs   Type Spiritual Support   Assessment/Intervention/Outcome   Assessment Calm;Coping; Impaired resilience; Interrupted family processes   Intervention Active listening;Explored/Affirmed feelings, thoughts, concerns;Nurtured Hope;Sustaining Presence/Ministry of presence   Outcome Comfort; Connection/Belonging;Expressed feelings, needs, and concerns;Receptive

## 2023-08-30 NOTE — PROGRESS NOTES
[facility]  Office: 7900  1826, DO, Sylvie Monge, DO, Louie Jackson, DO, Rashawn Munoz Blood, DO, Kitty Cole MD, Elisa Matos MD, Kateryna Little MD, Rosalinda Velazquez MD,  Glenna Sahni MD, Carolina Larson MD, Iris Anne, DO, Stacy Barahona MD,  Ignacio Painter MD, Jones Fontana MD, Shobha Grullon, DO, David Alcantara MD,  Margaret Hickey DO, Fermin Lomax MD, Elaina Dixon MD, Thierno Aguilera MD, Long Blanco MD,  Abigail Monet MD, Thi Chanel MD, Veronique Fraire MD, Rabon Kocher, DO, Gabriel Macdonald MD,  Lorenza Winters MD, Quintin Ward, CNP,  Tariq Kelsey, CNP,, Ariella Pride, CNP,  Meghann Hyde, [facility], Tresa Rodríguez, CNP, Gigi Collado, CNP, Rachael Vazquez, CNP, Jennifer Tompkins, CNP, Rosio Park, CNP, Minerva Galvan, CNP, Torrey Rodriguez PA-C, Chai Guerrero, CNS, Hammad Mcginnis, CNP, Samantha Johns, [address] [place] [address] [place]    Progress Note    8/30/2023    10:37 AM    Name:   Yocasta Betancourt  MRN:     [id number]     Acct:      [de-identified]   Room:   [address] [place] [address] Day:  2  Admit Date:  8/28/2023  3:28 PM    PCP:   Garland Mak MD  Code Status:  Full Code    Subjective:     C/C:   Chief Complaint   Patient presents with    Shortness of Breath     Intermittent SOB w/ ambulating. Sent by PCP after having EKG done today in office     Interval History Status: Improved    He underwent right side thoracentesis yest with removal of 1500 ml of cloudy vilma pleural fluid. Blood pressure remains soft this morning. Christine Conde Respiratory status stable. Continues to be on 3 L oxygen via nasal cannula SPO2 92  Echocardiogram yesterday showed Severely reduced LVEF 15 - 20%. Global Akinesis and  Abnormal diastolic function. Mild MR and TR noted as well. Plan for cardiac cath later this afternoon per cardiology. .. Patient is kept n.p.o. for cath. Brief History:     Yocasta Betancourt is a 80 y.o. 12  --   --  10 10   LABGLOM >60  --   --  >60 >60   CALCIUM 9.2  --   --  9.0 8.9   PROBNP 6,137*  --   --  5,695*  --    TROPHS 197* 183* 207*  --   --      Recent Labs     08/29/23  0807   TSH 4.15   CHOL 137   HDL 39*   LDLCHOLESTEROL 86   CHOLHDLRATIO 3.5   TRIG 60     ABG:No results found for: POCPH, PHART, PH, POCPCO2, RPP8CUH, PCO2, POCPO2, PO2ART, PO2, POCHCO3, ZWA0HQI, HCO3, NBEA, PBEA, BEART, BE, THGBART, THB, SMI8DHD, DVRR0DCP, R5ZQPUEU, O2SAT, FIO2  Lab Results   Component Value Date/Time    SPECIAL LEFT AC,12ML 08/18/2022 11:35 AM     Lab Results   Component Value Date/Time    CULTURE ESCHERICHIA COLI LIGHT GROWTH (A) 08/19/2022 02:38 PM    CULTURE NORMAL SKIN [place] 08/19/2022 02:38 PM    CULTURE (A) 08/19/2022 02:38 PM     BACTEROIDES FRAGILIS MODERATE GROWTH BETA LACTAMASE POSITIVE    CULTURE (A) 08/19/2022 02:38 PM     PORPHYROMONAS SPECIES LIGHT GROWTH BETA LACTAMASE NEGATIVE       Radiology:  XR CHEST PORTABLE    Result Date: 8/28/2023  Findings most compatible pulmonary edema. Please correlate with any clinical evidence of superimposed pneumonia. Physical Examination:        Physical Exam  Constitutional:       General: He is not in acute distress. Appearance: He is well-developed. He is ill-appearing. HENT:      Head: Normocephalic and atraumatic. Eyes:      Conjunctiva/sclera: Conjunctivae normal.      Pupils: Pupils are equal, round, and reactive to light. Neck:      Thyroid: No thyromegaly. Vascular: No JVD. Cardiovascular:      Rate and Rhythm: Normal rate and regular rhythm. Heart sounds: Murmur heard. Pulmonary:      Effort: Respiratory distress present. Breath sounds: Normal breath sounds. No wheezing. Abdominal:      General: Bowel sounds are normal. There is no distension. Palpations: Abdomen is soft. Tenderness: There is no abdominal tenderness. There is no guarding or rebound. Musculoskeletal:         General: Swelling present.  Normal

## 2023-08-30 NOTE — PROGRESS NOTES
Occupational Therapy  Facility/Department: [facility] PROGRESSIVE CARE  Occupational Therapy Initial Assessment    Name: Eulalia Rincon  : 1955  MRN: [id number]  Date of Service: 2023    SHANE Virk reports patient is medically stable for therapy treatment this date. Chart reviewed prior to treatment and patient is agreeable for therapy. All lines intact and patient positioned comfortably at end of treatment. All patient needs addressed prior to ending therapy session. Due to recent hospitalization and medical condition, pt would benefit from additional intermittent skilled therapy at time of discharge. Please refer to the AM-PAC score for current functional status. Discharge Recommendations:  Patient would benefit from continued therapy after discharge  OT Equipment Recommendations  Equipment Needed: Yes  Mobility Devices: ADL Assistive Devices  ADL Assistive Devices: Shower Chair with back; Reacher;Sock-Aid Hard;Long-handled Sponge       Per H&P: Eulalia Rincon is a 80 y.o. Non- / non  male who presents with Shortness of Breath (Intermittent SOB w/ ambulating. Sent by PCP after having EKG done today in office) and is admitted to the hospital for the management of Acute heart failure, unspecified heart failure type ([address]). Patient reports to the emergency department with a 1 month history of shortness of breath and chest tightness. Patient reports that he can no longer mow his grass without becoming extremely short of breath. Patient also endorses bilateral peripheral edema. Patient has a history of diabetes and a family history of heart disease and multiple generations on both sides his family. Patient is very reluctant to provide any health history as he clearly does not wish to be ill. Interview with patient reveals that he had slow steady progression in his exertional dyspnea and lower extremity edema.   In the emergency department patient is found hypoxic requiring hospital room w/ CGA - initially no device used and pt observed to be unsteady on feet w/ pt commenting he also felt unsteady. trialled RW, w/ pt still needing CGA for mobility, however demo'd increased steadiness. Recommended to pt continued use of RW for mobility tasks, pt in agreement. Provided pt w/ O2 extension tubing, as pt stated he had been doffing O2 to get to bathroom in room - education provided on importance of using supplemental O2 as prescribed. Provided pt/spouse with printed handouts on ECT to implement into ADL/IADL tasks and w/ CHF heart action plan and reviewed education w/ pt. Vision  Vision: Impaired  Vision Exceptions: Wears glasses for reading  Hearing  Hearing: Within functional limits    Cognition  Overall Cognitive Status: [facility]  Arousal/Alertness: Appropriate responses to stimuli  Following Commands: Follows multistep commands consistently  Attention Span: Appears intact  Memory: Appears intact  Safety Judgement: Decreased awareness of need for safety  Problem Solving: Decreased awareness of errors;Assistance required to identify errors made  Insights: Decreased awareness of deficits  Initiation: Does not require cues  Sequencing: Requires cues for some  Orientation  Overall Orientation Status: Within Functional Limits  Perception  Overall Perceptual Status: [facility]               Education Given To: Patient; Family  Education Provided: Role of Therapy;Transfer Training;Equipment;Plan of Care;Energy Conservation; Fall Prevention Strategies;IADL Safety;Precautions; ADL Adaptive Strategies; Family Education  Education Method: Verbal;Demonstration;Printed Information/Hand-outs  Barriers to Learning: None  Education Outcome: Continued education needed;Verbalized understanding               AM-PAC Score        AM-PAC Inpatient Daily Activity Raw Score: 18 (08/30/23 1443)  AM-PAC Inpatient ADL T-Scale Score : 38.66 (08/30/23 1443)  ADL Inpatient CMS 0-100% Score: 46.65 (08/30/23 1443)  ADL

## 2023-08-30 NOTE — FLOWSHEET NOTE
Patient admitted to room 2042 from cath lab, vitals in progress, patient understands  laying flat restrictions. Call light in reach.

## 2023-08-31 ENCOUNTER — APPOINTMENT (OUTPATIENT)
Dept: INTERVENTIONAL RADIOLOGY/VASCULAR | Age: 68
DRG: 280 | End: 2023-08-31
Payer: MEDICARE

## 2023-08-31 ENCOUNTER — APPOINTMENT (OUTPATIENT)
Dept: GENERAL RADIOLOGY | Age: 68
DRG: 280 | End: 2023-08-31
Payer: MEDICARE

## 2023-08-31 PROBLEM — R73.9 HYPERGLYCEMIA: Status: RESOLVED | Noted: 2022-08-18 | Resolved: 2023-08-31

## 2023-08-31 PROBLEM — L03.90 CELLULITIS: Status: RESOLVED | Noted: 2022-08-19 | Resolved: 2023-08-31

## 2023-08-31 PROBLEM — I25.10 CAD, MULTIPLE VESSEL: Status: ACTIVE | Noted: 2023-08-31

## 2023-08-31 LAB
ANION GAP SERPL CALCULATED.3IONS-SCNC: 7 MMOL/L (ref 9–17)
BUN SERPL-MCNC: 17 MG/DL (ref 8–23)
BUN/CREAT SERPL: 15 (ref 9–20)
CALCIUM SERPL-MCNC: 8.8 MG/DL (ref 8.6–10.4)
CHLORIDE SERPL-SCNC: 102 MMOL/L (ref 98–107)
CO2 SERPL-SCNC: 30 MMOL/L (ref 20–31)
CREAT SERPL-MCNC: 1.1 MG/DL (ref 0.7–1.2)
GFR SERPL CREATININE-BSD FRML MDRD: >60 ML/MIN/1.73M2
GLUCOSE SERPL-MCNC: 145 MG/DL (ref 70–99)
MAGNESIUM SERPL-MCNC: 2 MG/DL (ref 1.6–2.6)
POTASSIUM SERPL-SCNC: 3.9 MMOL/L (ref 3.7–5.3)
SODIUM SERPL-SCNC: 139 MMOL/L (ref 135–144)

## 2023-08-31 PROCEDURE — 2580000003 HC RX 258: Performed by: INTERNAL MEDICINE

## 2023-08-31 PROCEDURE — 97162 PT EVAL MOD COMPLEX 30 MIN: CPT

## 2023-08-31 PROCEDURE — 97110 THERAPEUTIC EXERCISES: CPT

## 2023-08-31 PROCEDURE — 6370000000 HC RX 637 (ALT 250 FOR IP): Performed by: INTERNAL MEDICINE

## 2023-08-31 PROCEDURE — 6370000000 HC RX 637 (ALT 250 FOR IP): Performed by: STUDENT IN AN ORGANIZED HEALTH CARE EDUCATION/TRAINING PROGRAM

## 2023-08-31 PROCEDURE — 71045 X-RAY EXAM CHEST 1 VIEW: CPT

## 2023-08-31 PROCEDURE — 2060000000 HC ICU INTERMEDIATE R&B

## 2023-08-31 PROCEDURE — 97116 GAIT TRAINING THERAPY: CPT

## 2023-08-31 PROCEDURE — 2709999900 IR GUIDED THORACENTESIS PLEURAL

## 2023-08-31 PROCEDURE — 6360000002 HC RX W HCPCS: Performed by: INTERNAL MEDICINE

## 2023-08-31 PROCEDURE — 36415 COLL VENOUS BLD VENIPUNCTURE: CPT

## 2023-08-31 PROCEDURE — 2580000003 HC RX 258: Performed by: NURSE PRACTITIONER

## 2023-08-31 PROCEDURE — 2700000000 HC OXYGEN THERAPY PER DAY

## 2023-08-31 PROCEDURE — 83735 ASSAY OF MAGNESIUM: CPT

## 2023-08-31 PROCEDURE — 97530 THERAPEUTIC ACTIVITIES: CPT

## 2023-08-31 PROCEDURE — 80048 BASIC METABOLIC PNL TOTAL CA: CPT

## 2023-08-31 PROCEDURE — 99232 SBSQ HOSP IP/OBS MODERATE 35: CPT | Performed by: STUDENT IN AN ORGANIZED HEALTH CARE EDUCATION/TRAINING PROGRAM

## 2023-08-31 RX ORDER — LISINOPRIL 2.5 MG/1
2.5 TABLET ORAL 2 TIMES DAILY
Qty: 30 TABLET | Refills: 3 | Status: CANCELLED | OUTPATIENT
Start: 2023-08-31

## 2023-08-31 RX ORDER — FUROSEMIDE 20 MG/1
20 TABLET ORAL DAILY
Status: DISCONTINUED | OUTPATIENT
Start: 2023-09-01 | End: 2023-09-02 | Stop reason: HOSPADM

## 2023-08-31 RX ORDER — FUROSEMIDE 20 MG/1
20 TABLET ORAL 2 TIMES DAILY
Status: DISCONTINUED | OUTPATIENT
Start: 2023-08-31 | End: 2023-08-31

## 2023-08-31 RX ADMIN — Medication 12.5 MG: at 20:10

## 2023-08-31 RX ADMIN — FUROSEMIDE 20 MG: 10 INJECTION, SOLUTION INTRAMUSCULAR; INTRAVENOUS at 09:05

## 2023-08-31 RX ADMIN — Medication 12.5 MG: at 09:04

## 2023-08-31 RX ADMIN — LISINOPRIL 2.5 MG: 2.5 TABLET ORAL at 20:10

## 2023-08-31 RX ADMIN — ASPIRIN 81 MG: 81 TABLET, COATED ORAL at 09:04

## 2023-08-31 RX ADMIN — SODIUM CHLORIDE, PRESERVATIVE FREE 10 ML: 5 INJECTION INTRAVENOUS at 20:11

## 2023-08-31 RX ADMIN — SODIUM CHLORIDE, PRESERVATIVE FREE 10 ML: 5 INJECTION INTRAVENOUS at 09:05

## 2023-08-31 RX ADMIN — CLOPIDOGREL BISULFATE 75 MG: 75 TABLET ORAL at 09:05

## 2023-08-31 RX ADMIN — ROSUVASTATIN CALCIUM 5 MG: 10 TABLET, FILM COATED ORAL at 20:10

## 2023-08-31 ASSESSMENT — ENCOUNTER SYMPTOMS
CHEST TIGHTNESS: 0
SHORTNESS OF BREATH: 1
COUGH: 0
ABDOMINAL PAIN: 0
WHEEZING: 0
BACK PAIN: 0

## 2023-08-31 NOTE — PLAN OF CARE
Pt BP still on the soft side - 73-95B systolic, but asymptomatic. Cardiology notified; meds adjusted and parameters placed. Plan is for a left thoracentesis tomorrow, and then possibly home if BP stabilizes per cardio. Pt SBA to the bathroom, but also uses the urinal.    Problem: Cardiovascular - Adult  Goal: Maintains optimal cardiac output and hemodynamic stability  Outcome: Progressing  No edema present. Mucous membranes moist. Tolerates fluids. BP low, cardiology aware - parameters placed on medications. Pt has no c/o dizziness/lightheadedness. Problem: Safety - Adult  Goal: Free from fall injury  8/31/2023 1758 by Cinda Perez, RN  Outcome: Progressing  Patient is fall risk per fall scale. Falling star on door. Fall sticker on armband. Hourly rounding performed. Personal belongings and call light within reach. Bed in low position.

## 2023-08-31 NOTE — PROGRESS NOTES
[facility]  Office: [address], DO, Tom Madrigal, DO, Alysia Ibrahim, DO, Howie Cortes, DO, Misael Cardoso MD, Lulu Raines MD, Severino Powell MD, Frida Tabares MD,  Sabino Bocanegra MD, Patrick Jones MD, Sherry Camarena, DO, Karen Vogt MD,  Stef Torres MD, Juliette Snell MD, Henny Carroll, DO, Veronica Hess MD,  Trip Ferguson DO, Chris Campos MD, Vanessa Blackwell MD, Honorio Avalos MD, Janette Bundy MD,  Beulah Rider MD, Winston No MD, Loni Aguila MD, Adonay Randolph DO, Randy Shrestha MD,  Keshia Ahmadi MD, Jose Russell, CNP,  Sridevi Beth, CNP,, Yuniel Escudero, CNP,  John Butler, [facility], Pierre Pena, CNP, Debra Guzman, CNP, Laura Cody, CNP, Raffi Waters, CNP, Darell Pryor, CNP, Donato Morgan, CNP, Kavitha Gutierrez PA-C, Michelle Barney, LAUREN, Sancho Robles, CNP, Pito De Paz, [address]    Progress Note    8/31/2023    10:38 AM    Name:   Riya Urrutia  MRN:     [id number]     Acct:      [de-identified]   Room:   [address] [place] [address] Day:  3  Admit Date:  8/28/2023  3:28 PM    PCP:   Renée Fan MD  Code Status:  Full Code    Subjective:     C/C:   Chief Complaint   Patient presents with    Shortness of Breath     Intermittent SOB w/ ambulating. Sent by PCP after having EKG done today in office     Interval History Status:  significantly Improved    Pt had cardiac cath 8/30/23 which showed multivessel CAD, occluded LAD and severe small vessel disease in OM1, LPL and RPDA along with severe LV dysfunction for which cardiology recommended medical therapy. Remained stable postprocedure. No significant events overnight. Seen and examined bedside this morning. Blood pressure continues to be soft. Maintaining MAP. Respiratory status better. Requiring 1 to 2 L oxygen by nasal cannula SPO2 97% weaning off. Discussed with RN.   Otherwise denies cardiology recommended medical therapy. On aspirin, Plavix, statin, Lopressor 12.5 twice daily and lisinopril 5 mg daily. Appreciate cardiology input. Acute onset of combined heart failure. Severely reduced LVEF of 15 to 20% and global akinesis and diastolic dysfunction on echo 8/9/2023. On Lasix 20 twice daily and GDMT-aspirin, Plavix, Lopressor, lisinopril. Net -7.5 L since admission. Appreciate cardiology input. Acute pulmonary edema from underlying CHF exacerbation-continue Lasix 40 mg. Net -7.5 daily since admission. Repeat chest x-ray this morning. We will hold off on thoracentesis. Repeat chest x-ray in 1 week after discharge. Outpatient cardiac follow-up. Acute hypoxic respiratory failure-s/p right-sided thoracentesis with removal of 1500 mL of cloudy vilma-colored pleural fluid 8/29/2023. Respiratory status much better. Requiring 1 to 2 L oxygen by nasal cannula SPO2 97%. Weaning off. Chest x-ray done this morning. Hold off on thoracentesis. Repeat chest x-ray in 1 week after discharge. Type 2 diabetes-blood sugar well controlled currently. Insulin sliding scale. Hypoglycemia protocol and POCT glucose checks. Sinus tachycardia-resolved. Hypotension-continue. Blood pressure continues to be soft. Continue to monitor. Discharge plan-plan to discharge home today later in the afternoon after clearance from cardiology. Home O2 eval before discharge.     Karlos Griffin MD  8/31/2023  10:38 AM

## 2023-08-31 NOTE — PROGRESS NOTES
Spoke to Renetta NP with Dr Idania Grande office regarding pt BP. Explained pt is asymptomatic - she states no intervention right now, and just to monitor pt.

## 2023-08-31 NOTE — PLAN OF CARE
Pt remained free from falls or injuries. Insertion site from cath procedure was assessed, pulses found and no bleeding present at dressing site. Pt continued to use 3L of oxygen and he reported he ambulated to bathroom for bowel movement during night time.      Problem: Discharge Planning  Goal: Discharge to home or other facility with appropriate resources  Outcome: Progressing  Discharge to home or other facility with appropriate resources:   Identify barriers to discharge with patient and caregiver   Arrange for needed discharge resources and transportation as appropriate   Identify discharge learning needs (meds, wound care, etc)     Problem: Safety - Adult  Goal: Free from fall injury  Outcome: Progressing  Free From Fall Injury: Instruct family/caregiver on patient safety      Problem: Metabolic/Fluid and Electrolytes - Adult  Goal: Electrolytes maintained within normal limits  Outcome: Progressing  Electrolytes maintained within normal limits:   Monitor labs and assess patient for signs and symptoms of electrolyte imbalances   Administer electrolyte replacement as ordered   Monitor response to electrolyte replacements, including repeat lab results as appropriate

## 2023-08-31 NOTE — FLOWSHEET NOTE
08/31/23 0527   Treatment Team Notification   Reason for Communication Abnormal vitals   Name of Team Member Notified [facility]   Treatment Team Role Consulting Provider   Method of Communication Secure Message   Response No new orders   Notification Time 0528     Low BP, provider notified.  No new orders, continue to monitor and follow order parameters for BS medications in AM.

## 2023-08-31 NOTE — PROGRESS NOTES
Physical Therapy  Facility/Department: [facility] PROGRESSIVE CARE  Physical Therapy Initial Assessment    Name: Marina Escobar  : 1955  MRN: [id number]  Date of Service: 2023    Discharge Recommendations:  Patient would benefit from continued therapy after discharge       Pt presented to ED on 23 with a 1 month history of shortness of breath and chest tightness. Patient reports that he can no longer mow his grass without becoming extremely short of breath. Patient also endorses bilateral peripheral edema. Patient has a history of diabetes and a family history of heart disease and multiple generations on both sides his family. Patient is very reluctant to provide any health history as he clearly does not wish to be ill. Interview with patient reveals that he had slow steady progression in his exertional dyspnea and lower extremity edema. In the emergency department patient is found hypoxic requiring supplemental oxygen. Patient has edema and pulmonary edema. PMI is displaced on physical exam of heart tones. Cardiomegaly identified on chest x-ray. Troponins are found to be elevated. NSTEMI with new onset heart failure and known risk factors of age and diabetes patient high risk for ACS. Pt admitted to the hospital for the management of Acute heart failure, unspecified heart failure type. Pt had R sided  lung thoracentesis on , plan is for for Left side today & cardiac catherization on 23      Patient Diagnosis(es): The primary encounter diagnosis was Congestive heart failure, unspecified HF chronicity, unspecified heart failure type ([address]). Diagnoses of NSTEMI (non-ST elevated myocardial infarction) ([address]), Heart failure ([address]), and Acute on chronic systolic heart failure ([address]) were also pertinent to this visit. Past Medical History:  has no past medical history on file. Past Surgical History:  has a past surgical history that includes Tonsillectomy;  Toe amputation (Right, 2022);

## 2023-08-31 NOTE — DISCHARGE SUMMARY
[facility]  Office: 79[address]  1826, DO, Roberto Vasquez, DO, Mirela Herron DO, Jennifer Cortes, DO, Norman Strickland MD, Zoraida Zhang MD, Frederic Ziegler MD, Bess Buck MD,  Yara Prater MD, Henrry Balderas MD, Paulo Lindsey, DO, Gretel Brsicoe MD,  Baltazar Rodriguez, DO, Jona Anand MD, Ant Sifuentes MD, Pedro Luis Floyd, DO, Charley Zavala MD,  Khushi Wheat DO, Ricardo Vincent MD, Radha Cortez MD, Mckinley Buchanan MD, Evert Watts MD,  Gilson Younger MD, Page Hopkins MD, Jeronimo Pace MD, Marilee Becker, DO, Tony Kumar MD,  Nando Lilly MD, Jodeen Kussmaul, CNP,  Mary Cadet, CNP,, Tima Gallo, CNP,  Mark Oneill, [facility], Oscar Neigh, CNP, Laurence Jos, CNP, Luis Maxime, CNP, Hugo Hammonds, CNP, Nicholas Rincon, CNP, Oumou Piedra, CNP, Tamara Hendrickson PA-C, Saray Edwards, CNS, Luli Quinones, CNP, Toshia Narvaez, [address] [facility]    Discharge Summary     Patient ID: Gorge Kirkpatrick  :  1955   MRN: [id number]     ACCOUNT:  [de-identified]   Patient's PCP: Victorino Meraz MD  Admit Date: 2023   Discharge Date: 2023     Length of Stay: 5  Code Status:  Full Code  Admitting Physician: Zelda Manley DO  Discharge Physician: Evert Watts MD     Active Discharge Diagnoses:     Hospital Problem Lists:  Principal Problem:    CAD, multiple vessel  Active Problems:    New onset type 2 diabetes mellitus ([address])    Acute combined heart failure    NSTEMI (non-ST elevated myocardial infarction) [facility])    Peripheral edema    Acute respiratory failure with hypoxia [facility])    Pulmonary edema cardiac cause [facility])  Resolved Problems:    * No resolved hospital problems.  *      Admission Condition:  fair     Discharged Condition: stable    Hospital Stay:     Hospital Course:  Gorge Kirkpatrick is a 80 y.o. male who was admitted for the management of   CAD, multiple vessel , presented to ER F [phone]  [address] [facility] Britney Grzegorz, [facility]      Phone: [phone]   aspirin 81 MG EC tablet  clopidogrel 75 MG tablet  metoprolol tartrate 25 MG tablet  rosuvastatin 5 MG tablet         No discharge procedures on file. Time Spent on discharge is  35 mins in patient examination, evaluation, counseling as well as medication reconciliation, prescriptions for required medications, discharge plan and follow up. Electronically signed by   Orly Grant MD  9/2/2023  11:15 AM      Thank you Dr. Yenifer Gage MD for the opportunity to be involved in this patient's care.

## 2023-08-31 NOTE — CARE COORDINATION
Discharge planning    Chart reviewed. Patient independent pta. He lives with wife. Independent. Drives. DME RW and cane but uses neither at this time He has had home care in past with iv atb, [facility]. He has never been to a snf. Patient s/p cardiac cath yesterday    CATH  Multivessel CAD  Occluded LAD  Severe small vessel disease in OM1, LPL, and RPDA  Severe LV dysfunction     Plan  Medical therapy. Patient continues on IV lasix. On 2 L Nc and will need home 02 evaluation     Will follow.

## 2023-08-31 NOTE — PROGRESS NOTES
Home Oxygen Evaluation    Home Oxygen Evaluation completed. Patient is on 2 liters per minute via NC.   Resting SpO2 = 99%  Resting SpO2 on room air = 96%    SpO2 on room air with exercise = 95%    Eric Barber RN  11:57 AM

## 2023-09-01 ENCOUNTER — APPOINTMENT (OUTPATIENT)
Dept: GENERAL RADIOLOGY | Age: 68
DRG: 280 | End: 2023-09-01
Payer: MEDICARE

## 2023-09-01 ENCOUNTER — APPOINTMENT (OUTPATIENT)
Dept: INTERVENTIONAL RADIOLOGY/VASCULAR | Age: 68
DRG: 280 | End: 2023-09-01
Payer: MEDICARE

## 2023-09-01 LAB
ANION GAP SERPL CALCULATED.3IONS-SCNC: 8 MMOL/L (ref 9–17)
BUN SERPL-MCNC: 20 MG/DL (ref 8–23)
BUN/CREAT SERPL: 18 (ref 9–20)
CALCIUM SERPL-MCNC: 9 MG/DL (ref 8.6–10.4)
CHLORIDE SERPL-SCNC: 102 MMOL/L (ref 98–107)
CO2 SERPL-SCNC: 27 MMOL/L (ref 20–31)
CREAT SERPL-MCNC: 1.1 MG/DL (ref 0.7–1.2)
ERYTHROCYTE [DISTWIDTH] IN BLOOD BY AUTOMATED COUNT: 13.3 % (ref 11.8–14.4)
GFR SERPL CREATININE-BSD FRML MDRD: >60 ML/MIN/1.73M2
GLUCOSE SERPL-MCNC: 155 MG/DL (ref 70–99)
HCT VFR BLD AUTO: 44.3 % (ref 40.7–50.3)
HGB BLD-MCNC: 14.1 G/DL (ref 13–17)
LDH FLD L TO P-CCNC: 61 U/L
MAGNESIUM SERPL-MCNC: 2.1 MG/DL (ref 1.6–2.6)
MCH RBC QN AUTO: 31.1 PG (ref 25.2–33.5)
MCHC RBC AUTO-ENTMCNC: 31.8 G/DL (ref 28.4–34.8)
MCV RBC AUTO: 97.6 FL (ref 82.6–102.9)
NRBC BLD-RTO: 0 PER 100 WBC
PH FLUID: 7
PLATELET # BLD AUTO: 214 K/UL (ref 138–453)
PMV BLD AUTO: 10.9 FL (ref 8.1–13.5)
POTASSIUM SERPL-SCNC: 4.2 MMOL/L (ref 3.7–5.3)
PROT FLD-MCNC: 2.1 G/DL
RBC # BLD AUTO: 4.54 M/UL (ref 4.21–5.77)
SODIUM SERPL-SCNC: 137 MMOL/L (ref 135–144)
SPECIMEN TYPE: NORMAL
WBC OTHER # BLD: 6.8 K/UL (ref 3.5–11.3)

## 2023-09-01 PROCEDURE — 83986 ASSAY PH BODY FLUID NOS: CPT

## 2023-09-01 PROCEDURE — 36415 COLL VENOUS BLD VENIPUNCTURE: CPT

## 2023-09-01 PROCEDURE — 6370000000 HC RX 637 (ALT 250 FOR IP): Performed by: NURSE PRACTITIONER

## 2023-09-01 PROCEDURE — 88305 TISSUE EXAM BY PATHOLOGIST: CPT

## 2023-09-01 PROCEDURE — 84157 ASSAY OF PROTEIN OTHER: CPT

## 2023-09-01 PROCEDURE — 2709999900 IR GUIDED THORACENTESIS PLEURAL

## 2023-09-01 PROCEDURE — 6370000000 HC RX 637 (ALT 250 FOR IP): Performed by: STUDENT IN AN ORGANIZED HEALTH CARE EDUCATION/TRAINING PROGRAM

## 2023-09-01 PROCEDURE — 2580000003 HC RX 258: Performed by: INTERNAL MEDICINE

## 2023-09-01 PROCEDURE — 2580000003 HC RX 258: Performed by: NURSE PRACTITIONER

## 2023-09-01 PROCEDURE — 6370000000 HC RX 637 (ALT 250 FOR IP): Performed by: INTERNAL MEDICINE

## 2023-09-01 PROCEDURE — 87205 SMEAR GRAM STAIN: CPT

## 2023-09-01 PROCEDURE — 80048 BASIC METABOLIC PNL TOTAL CA: CPT

## 2023-09-01 PROCEDURE — 85027 COMPLETE CBC AUTOMATED: CPT

## 2023-09-01 PROCEDURE — 88112 CYTOPATH CELL ENHANCE TECH: CPT

## 2023-09-01 PROCEDURE — 32555 ASPIRATE PLEURA W/ IMAGING: CPT

## 2023-09-01 PROCEDURE — 2060000000 HC ICU INTERMEDIATE R&B

## 2023-09-01 PROCEDURE — 87070 CULTURE OTHR SPECIMN AEROBIC: CPT

## 2023-09-01 PROCEDURE — 71045 X-RAY EXAM CHEST 1 VIEW: CPT

## 2023-09-01 PROCEDURE — 99232 SBSQ HOSP IP/OBS MODERATE 35: CPT | Performed by: STUDENT IN AN ORGANIZED HEALTH CARE EDUCATION/TRAINING PROGRAM

## 2023-09-01 PROCEDURE — 83615 LACTATE (LD) (LDH) ENZYME: CPT

## 2023-09-01 PROCEDURE — 87075 CULTR BACTERIA EXCEPT BLOOD: CPT

## 2023-09-01 PROCEDURE — 0W993ZZ DRAINAGE OF RIGHT PLEURAL CAVITY, PERCUTANEOUS APPROACH: ICD-10-PCS | Performed by: INTERNAL MEDICINE

## 2023-09-01 PROCEDURE — 83735 ASSAY OF MAGNESIUM: CPT

## 2023-09-01 RX ORDER — LOSARTAN POTASSIUM 25 MG/1
25 TABLET ORAL NIGHTLY
Status: DISCONTINUED | OUTPATIENT
Start: 2023-09-01 | End: 2023-09-02 | Stop reason: HOSPADM

## 2023-09-01 RX ADMIN — ROSUVASTATIN CALCIUM 5 MG: 10 TABLET, FILM COATED ORAL at 21:14

## 2023-09-01 RX ADMIN — Medication 12.5 MG: at 09:49

## 2023-09-01 RX ADMIN — LISINOPRIL 2.5 MG: 2.5 TABLET ORAL at 11:54

## 2023-09-01 RX ADMIN — SODIUM CHLORIDE, PRESERVATIVE FREE 10 ML: 5 INJECTION INTRAVENOUS at 21:16

## 2023-09-01 RX ADMIN — SODIUM CHLORIDE, PRESERVATIVE FREE 10 ML: 5 INJECTION INTRAVENOUS at 21:14

## 2023-09-01 RX ADMIN — LOSARTAN POTASSIUM 25 MG: 25 TABLET, FILM COATED ORAL at 21:13

## 2023-09-01 RX ADMIN — FUROSEMIDE 20 MG: 20 TABLET ORAL at 13:30

## 2023-09-01 RX ADMIN — Medication 12.5 MG: at 21:13

## 2023-09-01 RX ADMIN — ASPIRIN 81 MG: 81 TABLET, COATED ORAL at 09:49

## 2023-09-01 RX ADMIN — CLOPIDOGREL BISULFATE 75 MG: 75 TABLET ORAL at 09:49

## 2023-09-01 ASSESSMENT — ENCOUNTER SYMPTOMS
BACK PAIN: 0
COUGH: 0
WHEEZING: 0
ABDOMINAL PAIN: 0
SHORTNESS OF BREATH: 0
CHEST TIGHTNESS: 0

## 2023-09-01 NOTE — PROGRESS NOTES
[facility]  Office: [address], DO, Tamir Massey, DO, Idania Johns, DO, Shantanu Cortes, DO, Carolyn Hayes MD, Sandra Harris MD, Christine Alvarado MD, Mckenna Schulz MD,  Asuncion Smith MD, Sharmaine Lofton MD, Jacquelin Carnes, DO, Melania Burger MD,  Moe Lee MD, Román Bucio MD, Meron Harris DO, Eusebio Webster MD,  Nohelia Zamudio DO, Lisa Donohue MD, Sadaf Liu MD, Toni Richardson MD, Robi Hodge MD,  Fernando Landis MD, Alma Sarmiento MD, Bubba Schwab, MD, Carlos Venegas DO, Jareth Ibanez MD,  Ellen Noel MD, Ersamo Hendrix, CNP,  Ligia Griffin, CNP,, Dong Yuan, CNP,  Yuniel Gupta, RITA, Nadja Elias, CNP, Doni Acosta, CNP, Natali Holt, CNP, Bridget Maloney, CNP, Yolie Cintron, CNP, Anaya Pa, CNP, Milka Melara PA-C, Daniela Jo, CNS, Lakhwinder Santos, CNP, Connie Harvey, [address]    Progress Note    9/1/2023    12:30 PM    Name:   Bucky Shah  MRN:     [id number]     Acct:      [de-identified]   Room:   [address] [place] [address] Day:  4  Admit Date:  8/28/2023  3:28 PM    PCP:   Tam Sow MD  Code Status:  Full Code    Subjective:     C/C:   Chief Complaint   Patient presents with    Shortness of Breath     Intermittent SOB w/ ambulating. Sent by PCP after having EKG done today in office     Interval History Status:  significantly Improved    Pt seen and examined bedside. BP better today. Had left sided thoracentesis today morning with removal of 1800 vilma pleural fluid . Studies send. Stable post procedure, resp better. Denies any other current complaints. Plan to d/c home later today once cleared by cardiology. Brief History:     Bucky Shah is a 80 y.o. Non- / non  male who presents with worsening dyspnea and is admitted for management of acute heart failure and NSTEMI.       Patient reports to the emergency [id number], T2WLMOT, FT4, TSH, AST, ALT, LDH, GGT, ALKPHOS, LABGGT, BILITOT, BILIDIR, AMMONIA, AMYLASE, LIPASE, LACTATE, CHOL, HDL, LDLCHOLESTEROL, CHOLHDLRATIO, TRIG, VLDL, RSA64RI, PHENYTOIN, PHENYF, URICACID, POCGLU in the last 72 hours. ABG:No results found for: POCPH, PHART, PH, POCPCO2, ANX6JEY, PCO2, POCPO2, PO2ART, PO2, POCHCO3, AYG7RUM, HCO3, NBEA, PBEA, BEART, BE, THGBART, THB, RMR1RCM, EZMB8UQQ, Y1CRVRBL, O2SAT, FIO2  Lab Results   Component Value Date/Time    SPECIAL LEFT AC,12ML 08/18/2022 11:35 AM     Lab Results   Component Value Date/Time    CULTURE ESCHERICHIA COLI LIGHT GROWTH (A) 08/19/2022 02:38 PM    CULTURE NORMAL SKIN [place] 08/19/2022 02:38 PM    CULTURE (A) 08/19/2022 02:38 PM     BACTEROIDES FRAGILIS MODERATE GROWTH BETA LACTAMASE POSITIVE    CULTURE (A) 08/19/2022 02:38 PM     PORPHYROMONAS SPECIES LIGHT GROWTH BETA LACTAMASE NEGATIVE       Radiology:  XR CHEST PORTABLE    Result Date: 8/28/2023  Findings most compatible pulmonary edema. Please correlate with any clinical evidence of superimposed pneumonia. Physical Examination:        Physical Exam  Constitutional:       General: He is not in acute distress. Appearance: He is well-developed. He is not ill-appearing. HENT:      Head: Normocephalic and atraumatic. Eyes:      Conjunctiva/sclera: Conjunctivae normal.      Pupils: Pupils are equal, round, and reactive to light. Neck:      Thyroid: No thyromegaly. Vascular: No JVD. Cardiovascular:      Rate and Rhythm: Normal rate and regular rhythm. Heart sounds: Murmur heard. Pulmonary:      Effort: No respiratory distress. Breath sounds: Normal breath sounds. No wheezing. Abdominal:      General: Bowel sounds are normal. There is no distension. Palpations: Abdomen is soft. Tenderness: There is no abdominal tenderness. There is no guarding or rebound. Musculoskeletal:         General: Swelling present. Normal range of motion.       Cervical

## 2023-09-01 NOTE — PROGRESS NOTES
Pt is feeling better  No chest pain or dyspnea  Mild cough  NSR  borderline hypotensive  No JVD  Lungs are clear  CV RRR S3  Mild peripheral edema    Hgb is stable  Renal function is normal  Will switch lisinopril to losartan.   If hemodynamically stable and with oxygenation, ok to discharge in AM

## 2023-09-01 NOTE — FLOWSHEET NOTE
Patient brought down per w/c for left Thoracentesis. Patient tolerated procedure well vitals stable and charted. Obtained 1800 of vilma pleural fluid. Chest xray obtained post. Dr Solo Jane to read.  Patient in no distress back to room

## 2023-09-01 NOTE — PLAN OF CARE
Pt alert and oriented. VSS. Pressures improved to 100s/70s; tolerated administration of both lisinopril and lopressor. NSR on tele. SpO2 mid 90s on RA. Afebrile. No c/o pain. Pt receiving po lasix. 450mL urinary output. Plan for left thora in am and possibly d/c home after. Bed alarm on, call light within reach. Will monitor.        Problem: Discharge Planning  Goal: Discharge to home or other facility with appropriate resources  9/1/2023 0258 by Gabriel De Santiago RN  Outcome: Progressing  8/31/2023 1758 by Kristen Tavera RN  Outcome: Progressing     Problem: Safety - Adult  Goal: Free from fall injury  9/1/2023 0258 by Gabriel De Santiago RN  Outcome: Progressing  8/31/2023 1758 by Kristen Tavera RN  Outcome: Progressing     Problem: Cardiovascular - Adult  Goal: Maintains optimal cardiac output and hemodynamic stability  9/1/2023 0258 by Gabriel De Santiago RN  Outcome: Progressing  8/31/2023 1758 by Kristen Tavera RN  Outcome: Progressing  Goal: Absence of cardiac dysrhythmias or at baseline  9/1/2023 0258 by Gabriel De Santiago RN  Outcome: Progressing  8/31/2023 1758 by Kristen Tavera RN  Outcome: Progressing     Problem: Respiratory - Adult  Goal: Achieves optimal ventilation and oxygenation  9/1/2023 0258 by Gabriel De Santiago RN  Outcome: Progressing  8/31/2023 1758 by Kristen Tavera RN  Outcome: Progressing     Problem: Metabolic/Fluid and Electrolytes - Adult  Goal: Electrolytes maintained within normal limits  9/1/2023 0258 by Gabriel De Santiago RN  Outcome: Progressing  8/31/2023 1758 by Kristen Tavera RN  Outcome: Progressing  Goal: Glucose maintained within prescribed range  9/1/2023 0258 by Gabriel De Santiago RN  Outcome: Progressing  8/31/2023 1758 by Kristen Tavera RN  Outcome: Progressing     Problem: Musculoskeletal - Adult  Goal: Return mobility to safest level of function  9/1/2023 0258 by Gabriel De Santiago RN  Outcome: Progressing  8/31/2023 1758 by Kristen Tavera RN  Outcome: Progressing

## 2023-09-01 NOTE — CARE COORDINATION
IMM letter provided to patient. Patient offered four hours to make informed decision regarding appeal process; patient agreeable to discharge plan.

## 2023-09-02 VITALS
HEIGHT: 74 IN | BODY MASS INDEX: 25.4 KG/M2 | SYSTOLIC BLOOD PRESSURE: 97 MMHG | TEMPERATURE: 97.3 F | DIASTOLIC BLOOD PRESSURE: 72 MMHG | OXYGEN SATURATION: 96 % | RESPIRATION RATE: 16 BRPM | WEIGHT: 197.9 LBS | HEART RATE: 89 BPM

## 2023-09-02 LAB
ANION GAP SERPL CALCULATED.3IONS-SCNC: 9 MMOL/L (ref 9–17)
BUN SERPL-MCNC: 20 MG/DL (ref 8–23)
BUN/CREAT SERPL: 22 (ref 9–20)
CALCIUM SERPL-MCNC: 8.9 MG/DL (ref 8.6–10.4)
CHLORIDE SERPL-SCNC: 102 MMOL/L (ref 98–107)
CO2 SERPL-SCNC: 28 MMOL/L (ref 20–31)
CREAT SERPL-MCNC: 0.9 MG/DL (ref 0.7–1.2)
GFR SERPL CREATININE-BSD FRML MDRD: >60 ML/MIN/1.73M2
GLUCOSE SERPL-MCNC: 130 MG/DL (ref 70–99)
MAGNESIUM SERPL-MCNC: 2.1 MG/DL (ref 1.6–2.6)
POTASSIUM SERPL-SCNC: 4.2 MMOL/L (ref 3.7–5.3)
SODIUM SERPL-SCNC: 139 MMOL/L (ref 135–144)

## 2023-09-02 PROCEDURE — 6370000000 HC RX 637 (ALT 250 FOR IP): Performed by: NURSE PRACTITIONER

## 2023-09-02 PROCEDURE — 99239 HOSP IP/OBS DSCHRG MGMT >30: CPT | Performed by: STUDENT IN AN ORGANIZED HEALTH CARE EDUCATION/TRAINING PROGRAM

## 2023-09-02 PROCEDURE — 36415 COLL VENOUS BLD VENIPUNCTURE: CPT

## 2023-09-02 PROCEDURE — 2580000003 HC RX 258: Performed by: INTERNAL MEDICINE

## 2023-09-02 PROCEDURE — 2580000003 HC RX 258: Performed by: NURSE PRACTITIONER

## 2023-09-02 PROCEDURE — 6370000000 HC RX 637 (ALT 250 FOR IP): Performed by: INTERNAL MEDICINE

## 2023-09-02 PROCEDURE — 80048 BASIC METABOLIC PNL TOTAL CA: CPT

## 2023-09-02 PROCEDURE — 6370000000 HC RX 637 (ALT 250 FOR IP): Performed by: STUDENT IN AN ORGANIZED HEALTH CARE EDUCATION/TRAINING PROGRAM

## 2023-09-02 PROCEDURE — 83735 ASSAY OF MAGNESIUM: CPT

## 2023-09-02 RX ORDER — ASPIRIN 81 MG/1
81 TABLET ORAL DAILY
Qty: 30 TABLET | Refills: 3 | Status: SHIPPED | OUTPATIENT
Start: 2023-09-02

## 2023-09-02 RX ORDER — LOSARTAN POTASSIUM 25 MG/1
25 TABLET ORAL NIGHTLY
Qty: 30 TABLET | Refills: 3 | Status: SHIPPED | OUTPATIENT
Start: 2023-09-02

## 2023-09-02 RX ORDER — FUROSEMIDE 20 MG/1
20 TABLET ORAL DAILY
Qty: 60 TABLET | Refills: 3 | Status: SHIPPED | OUTPATIENT
Start: 2023-09-03

## 2023-09-02 RX ORDER — CLOPIDOGREL BISULFATE 75 MG/1
75 TABLET ORAL DAILY
Qty: 30 TABLET | Refills: 3 | Status: SHIPPED | OUTPATIENT
Start: 2023-09-02

## 2023-09-02 RX ORDER — ROSUVASTATIN CALCIUM 5 MG/1
5 TABLET, COATED ORAL NIGHTLY
Qty: 30 TABLET | Refills: 3 | Status: SHIPPED | OUTPATIENT
Start: 2023-09-02

## 2023-09-02 RX ADMIN — SODIUM CHLORIDE, PRESERVATIVE FREE 10 ML: 5 INJECTION INTRAVENOUS at 10:45

## 2023-09-02 RX ADMIN — FUROSEMIDE 20 MG: 20 TABLET ORAL at 10:31

## 2023-09-02 RX ADMIN — Medication 12.5 MG: at 10:31

## 2023-09-02 RX ADMIN — ASPIRIN 81 MG: 81 TABLET, COATED ORAL at 10:31

## 2023-09-02 RX ADMIN — SODIUM CHLORIDE, PRESERVATIVE FREE 10 ML: 5 INJECTION INTRAVENOUS at 10:32

## 2023-09-02 RX ADMIN — CLOPIDOGREL BISULFATE 75 MG: 75 TABLET ORAL at 10:31

## 2023-09-02 NOTE — PLAN OF CARE
Pt alert and oriented. Independent in room, walking zelaya with wife. Gait steady. Pt denies dizziness, SOB or chest pain. Safety measures remain in place.        Problem: Safety - Adult  Goal: Free from fall injury  9/2/2023 1043 by Asher Hurley RN  Outcome: Progressing     Problem: Cardiovascular - Adult  Goal: Maintains optimal cardiac output and hemodynamic stability  9/2/2023 1043 by Asher Hurley RN  Outcome: Progressing     Problem: Respiratory - Adult  Goal: Achieves optimal ventilation and oxygenation  9/2/2023 1043 by Asher Hurley RN  Outcome: Progressing

## 2023-09-02 NOTE — PROGRESS NOTES
Pt discharged to home with wife. Belonging gathered and taken with pt, IV removed, discharge instruction given, pt verbalizes understanding. All questions and concerns addressed. Safety maintained.

## 2023-09-02 NOTE — PLAN OF CARE
Problem: Discharge Planning  Goal: Discharge to home or other facility with appropriate resources  9/2/2023 1151 by Kristofer Morgan RN  Outcome: Completed

## 2023-09-02 NOTE — PLAN OF CARE
Pt alert and oriented. VSS. NSR on tele. SpO2 low 90s on RA. Afebrile. No c/o pain. Plan to d/c home today. Bed in low position, call light within reach. Will monitor.        Problem: Discharge Planning  Goal: Discharge to home or other facility with appropriate resources  Outcome: Progressing     Problem: Safety - Adult  Goal: Free from fall injury  Outcome: Progressing     Problem: Cardiovascular - Adult  Goal: Maintains optimal cardiac output and hemodynamic stability  Outcome: Progressing  Goal: Absence of cardiac dysrhythmias or at baseline  Outcome: Progressing     Problem: Respiratory - Adult  Goal: Achieves optimal ventilation and oxygenation  Outcome: Progressing     Problem: Metabolic/Fluid and Electrolytes - Adult  Goal: Electrolytes maintained within normal limits  Outcome: Progressing  Goal: Glucose maintained within prescribed range  Outcome: Progressing     Problem: Musculoskeletal - Adult  Goal: Return mobility to safest level of function  Outcome: Progressing

## 2023-09-03 LAB
MICROORGANISM SPEC CULT: ABNORMAL
MICROORGANISM/AGENT SPEC: ABNORMAL
SPECIMEN DESCRIPTION: ABNORMAL

## 2023-09-06 LAB
CASE NUMBER:: NORMAL
SPECIMEN DESCRIPTION: NORMAL
SURGICAL PATHOLOGY REPORT: NORMAL

## 2023-10-12 ENCOUNTER — HOSPITAL ENCOUNTER (OUTPATIENT)
Age: 68
Setting detail: SPECIMEN
Discharge: HOME | End: 2023-10-12
Payer: MEDICARE

## 2023-10-12 LAB
EST. AVERAGE GLUCOSE BLD GHB EST-MCNC: 151 MG/DL
HBA1C MFR BLD: 6.9 % (ref 4–6)

## 2023-10-12 PROCEDURE — 36415 COLL VENOUS BLD VENIPUNCTURE: CPT

## 2023-10-12 PROCEDURE — 83036 HEMOGLOBIN GLYCOSYLATED A1C: CPT

## 2023-12-02 ENCOUNTER — HOSPITAL ENCOUNTER (OUTPATIENT)
Age: 68
Discharge: HOME | End: 2023-12-02
Payer: MEDICARE

## 2023-12-02 LAB
ALBUMIN SERPL-MCNC: 4 G/DL (ref 3.5–5.2)
ALBUMIN/GLOB SERPL: 1 {RATIO} (ref 1–2.5)
ALP SERPL-CCNC: 68 U/L (ref 40–129)
ALT SERPL-CCNC: 13 U/L (ref 10–50)
ANION GAP SERPL CALCULATED.3IONS-SCNC: 9 MMOL/L (ref 9–16)
AST SERPL-CCNC: 23 U/L (ref 10–50)
BILIRUB SERPL-MCNC: 0.8 MG/DL (ref 0–1.2)
BUN SERPL-MCNC: 24 MG/DL (ref 8–23)
CALCIUM SERPL-MCNC: 9.3 MG/DL (ref 8.6–10.4)
CHLORIDE SERPL-SCNC: 105 MMOL/L (ref 98–107)
CHOLEST SERPL-MCNC: 157 MG/DL (ref 0–199)
CHOLESTEROL/HDL RATIO: 3
CK SERPL-CCNC: 97 U/L (ref 39–308)
CO2 SERPL-SCNC: 28 MMOL/L (ref 20–31)
CREAT SERPL-MCNC: 1 MG/DL (ref 0.7–1.2)
GFR SERPL CREATININE-BSD FRML MDRD: >60 ML/MIN/1.73M2
GLUCOSE SERPL-MCNC: 119 MG/DL (ref 74–99)
HDLC SERPL-MCNC: 45 MG/DL
LDLC SERPL CALC-MCNC: 96 MG/DL (ref 0–100)
POTASSIUM SERPL-SCNC: 4 MMOL/L (ref 3.7–5.3)
PROT SERPL-MCNC: 7.2 G/DL (ref 6.6–8.7)
SODIUM SERPL-SCNC: 142 MMOL/L (ref 136–145)
TRIGL SERPL-MCNC: 82 MG/DL (ref 0–149)
VLDLC SERPL CALC-MCNC: 16 MG/DL

## 2023-12-02 PROCEDURE — 80061 LIPID PANEL: CPT

## 2023-12-02 PROCEDURE — 82550 ASSAY OF CK (CPK): CPT

## 2023-12-02 PROCEDURE — 80053 COMPREHEN METABOLIC PANEL: CPT

## 2023-12-02 PROCEDURE — 36415 COLL VENOUS BLD VENIPUNCTURE: CPT

## 2024-03-04 ENCOUNTER — HOSPITAL ENCOUNTER (OUTPATIENT)
Age: 69
Discharge: HOME | End: 2024-03-04
Payer: MEDICARE

## 2024-03-04 LAB
ALBUMIN SERPL-MCNC: 4.1 G/DL (ref 3.5–5.2)
ALBUMIN/GLOB SERPL: 1.3 {RATIO} (ref 1–2.5)
ALP SERPL-CCNC: 67 U/L (ref 40–129)
ALT SERPL-CCNC: 22 U/L (ref 5–41)
ANION GAP SERPL CALCULATED.3IONS-SCNC: 9 MMOL/L (ref 9–17)
AST SERPL-CCNC: 22 U/L
BILIRUB SERPL-MCNC: 0.7 MG/DL (ref 0.3–1.2)
BUN SERPL-MCNC: 22 MG/DL (ref 8–23)
CALCIUM SERPL-MCNC: 9.4 MG/DL (ref 8.6–10.4)
CHLORIDE SERPL-SCNC: 105 MMOL/L (ref 98–107)
CHOLEST SERPL-MCNC: 154 MG/DL
CHOLESTEROL/HDL RATIO: 3.2
CK SERPL-CCNC: 115 U/L (ref 39–308)
CO2 SERPL-SCNC: 27 MMOL/L (ref 20–31)
CREAT SERPL-MCNC: 1.1 MG/DL (ref 0.7–1.2)
EST. AVERAGE GLUCOSE BLD GHB EST-MCNC: 137 MG/DL
GFR SERPL CREATININE-BSD FRML MDRD: >60 ML/MIN/1.73M2
GLUCOSE SERPL-MCNC: 121 MG/DL (ref 70–99)
HBA1C MFR BLD: 6.4 % (ref 4–6)
HDLC SERPL-MCNC: 48 MG/DL
LDLC SERPL CALC-MCNC: 88 MG/DL (ref 0–130)
POTASSIUM SERPL-SCNC: 4.3 MMOL/L (ref 3.7–5.3)
PROT SERPL-MCNC: 7.3 G/DL (ref 6.4–8.3)
SODIUM SERPL-SCNC: 141 MMOL/L (ref 135–144)
TRIGL SERPL-MCNC: 89 MG/DL

## 2024-03-04 PROCEDURE — 80061 LIPID PANEL: CPT

## 2024-03-04 PROCEDURE — 36415 COLL VENOUS BLD VENIPUNCTURE: CPT

## 2024-03-04 PROCEDURE — 80053 COMPREHEN METABOLIC PANEL: CPT

## 2024-03-04 PROCEDURE — 83036 HEMOGLOBIN GLYCOSYLATED A1C: CPT

## 2024-03-04 PROCEDURE — 82550 ASSAY OF CK (CPK): CPT

## 2024-06-03 ENCOUNTER — HOSPITAL ENCOUNTER (OUTPATIENT)
Age: 69
Discharge: HOME | End: 2024-06-03
Payer: MEDICARE

## 2024-06-03 LAB
ALBUMIN SERPL-MCNC: 4.1 G/DL (ref 3.5–5.2)
ALBUMIN/GLOB SERPL: 1 {RATIO} (ref 1–2.5)
ALP SERPL-CCNC: 67 U/L (ref 40–129)
ALT SERPL-CCNC: 13 U/L (ref 10–50)
ANION GAP SERPL CALCULATED.3IONS-SCNC: 10 MMOL/L (ref 9–16)
AST SERPL-CCNC: 26 U/L (ref 10–50)
BILIRUB SERPL-MCNC: 0.5 MG/DL (ref 0–1.2)
BUN SERPL-MCNC: 21 MG/DL (ref 8–23)
CALCIUM SERPL-MCNC: 9.2 MG/DL (ref 8.6–10.4)
CHLORIDE SERPL-SCNC: 107 MMOL/L (ref 98–107)
CHOLEST SERPL-MCNC: 150 MG/DL (ref 0–199)
CHOLESTEROL/HDL RATIO: 3
CK SERPL-CCNC: 147 U/L (ref 39–308)
CO2 SERPL-SCNC: 24 MMOL/L (ref 20–31)
CREAT SERPL-MCNC: 1.1 MG/DL (ref 0.7–1.2)
EST. AVERAGE GLUCOSE BLD GHB EST-MCNC: 148 MG/DL
GFR, ESTIMATED: 71 ML/MIN/1.73M2
GLUCOSE SERPL-MCNC: 108 MG/DL (ref 74–99)
HBA1C MFR BLD: 6.8 % (ref 4–6)
HDLC SERPL-MCNC: 47 MG/DL
LDLC SERPL CALC-MCNC: 84 MG/DL (ref 0–100)
POTASSIUM SERPL-SCNC: 3.9 MMOL/L (ref 3.7–5.3)
PROT SERPL-MCNC: 6.9 G/DL (ref 6.6–8.7)
SODIUM SERPL-SCNC: 141 MMOL/L (ref 136–145)
TRIGL SERPL-MCNC: 96 MG/DL (ref 0–149)
VLDLC SERPL CALC-MCNC: 19 MG/DL

## 2024-06-03 PROCEDURE — 82550 ASSAY OF CK (CPK): CPT

## 2024-06-03 PROCEDURE — 83036 HEMOGLOBIN GLYCOSYLATED A1C: CPT

## 2024-06-03 PROCEDURE — 36415 COLL VENOUS BLD VENIPUNCTURE: CPT

## 2024-06-03 PROCEDURE — 80053 COMPREHEN METABOLIC PANEL: CPT

## 2024-06-03 PROCEDURE — 80061 LIPID PANEL: CPT

## 2024-06-12 ENCOUNTER — TELEPHONE (OUTPATIENT)
Dept: PHARMACY | Age: 69
End: 2024-06-12

## 2024-06-12 NOTE — TELEPHONE ENCOUNTER
Wife, Jacy, LVM requesting a call from the clinic regarding a medication.  She did not say what med.  Called back but had to leave a message.

## 2024-09-04 ENCOUNTER — HOSPITAL ENCOUNTER (OUTPATIENT)
Age: 69
Discharge: HOME | End: 2024-09-04
Payer: MEDICARE

## 2024-09-04 LAB
ALBUMIN SERPL-MCNC: 4.3 G/DL (ref 3.5–5.2)
ALBUMIN/GLOB SERPL: 1 {RATIO} (ref 1–2.5)
ALP SERPL-CCNC: 66 U/L (ref 40–129)
ALT SERPL-CCNC: 12 U/L (ref 10–50)
ANION GAP SERPL CALCULATED.3IONS-SCNC: 8 MMOL/L (ref 9–16)
AST SERPL-CCNC: 25 U/L (ref 10–50)
BILIRUB SERPL-MCNC: 0.5 MG/DL (ref 0–1.2)
BUN SERPL-MCNC: 23 MG/DL (ref 8–23)
CALCIUM SERPL-MCNC: 9.5 MG/DL (ref 8.6–10.4)
CHLORIDE SERPL-SCNC: 108 MMOL/L (ref 98–107)
CHOLEST SERPL-MCNC: 155 MG/DL (ref 0–199)
CHOLESTEROL/HDL RATIO: 3
CK SERPL-CCNC: 293 U/L (ref 39–308)
CO2 SERPL-SCNC: 26 MMOL/L (ref 20–31)
CREAT SERPL-MCNC: 1.3 MG/DL (ref 0.7–1.2)
GFR, ESTIMATED: 63 ML/MIN/1.73M2
GLUCOSE SERPL-MCNC: 134 MG/DL (ref 74–99)
HDLC SERPL-MCNC: 49 MG/DL
LDLC SERPL CALC-MCNC: 86 MG/DL (ref 0–100)
POTASSIUM SERPL-SCNC: 4.4 MMOL/L (ref 3.7–5.3)
PROT SERPL-MCNC: 7.3 G/DL (ref 6.6–8.7)
SODIUM SERPL-SCNC: 142 MMOL/L (ref 136–145)
TRIGL SERPL-MCNC: 101 MG/DL (ref 0–149)
VLDLC SERPL CALC-MCNC: 20 MG/DL

## 2024-09-04 PROCEDURE — 80061 LIPID PANEL: CPT

## 2024-09-04 PROCEDURE — 80053 COMPREHEN METABOLIC PANEL: CPT

## 2024-09-04 PROCEDURE — 36415 COLL VENOUS BLD VENIPUNCTURE: CPT

## 2024-09-04 PROCEDURE — 82550 ASSAY OF CK (CPK): CPT

## 2024-12-10 ENCOUNTER — HOSPITAL ENCOUNTER (OUTPATIENT)
Age: 69
Discharge: HOME | End: 2024-12-10
Payer: MEDICARE

## 2024-12-10 LAB
ALT SERPL-CCNC: 29 U/L (ref 10–50)
AST SERPL-CCNC: 33 U/L (ref 10–50)
CHOLEST SERPL-MCNC: 112 MG/DL (ref 0–199)
CHOLESTEROL/HDL RATIO: 2.8
CK SERPL-CCNC: 196 U/L (ref 39–308)
HDLC SERPL-MCNC: 40 MG/DL
LDLC SERPL CALC-MCNC: 53 MG/DL (ref 0–100)
TRIGL SERPL-MCNC: 95 MG/DL (ref 0–149)
VLDLC SERPL CALC-MCNC: 19 MG/DL (ref 1–30)

## 2024-12-10 PROCEDURE — 36415 COLL VENOUS BLD VENIPUNCTURE: CPT

## 2024-12-10 PROCEDURE — 84460 ALANINE AMINO (ALT) (SGPT): CPT

## 2024-12-10 PROCEDURE — 80061 LIPID PANEL: CPT

## 2024-12-10 PROCEDURE — 84450 TRANSFERASE (AST) (SGOT): CPT

## 2024-12-10 PROCEDURE — 82550 ASSAY OF CK (CPK): CPT

## 2025-07-07 ENCOUNTER — HOSPITAL ENCOUNTER (OUTPATIENT)
Age: 70
Discharge: HOME | End: 2025-07-07
Payer: MEDICARE

## 2025-07-07 LAB
ALBUMIN SERPL-MCNC: 4 G/DL (ref 3.5–5.2)
ALBUMIN/GLOB SERPL: 1.4 {RATIO} (ref 1–2.5)
ALP SERPL-CCNC: 67 U/L (ref 40–129)
ALT SERPL-CCNC: 38 U/L (ref 10–50)
ANION GAP SERPL CALCULATED.3IONS-SCNC: 11 MMOL/L (ref 9–16)
AST SERPL-CCNC: 29 U/L (ref 10–50)
BASOPHILS # BLD: 0.04 K/UL (ref 0–0.2)
BASOPHILS NFR BLD: 1 % (ref 0–2)
BILIRUB SERPL-MCNC: 0.6 MG/DL (ref 0–1.2)
BUN SERPL-MCNC: 19 MG/DL (ref 8–23)
CALCIUM SERPL-MCNC: 9.6 MG/DL (ref 8.6–10.4)
CHLORIDE SERPL-SCNC: 106 MMOL/L (ref 98–107)
CHOLEST SERPL-MCNC: 117 MG/DL (ref 0–199)
CHOLESTEROL/HDL RATIO: 2.5
CK SERPL-CCNC: 185 U/L (ref 39–308)
CO2 SERPL-SCNC: 25 MMOL/L (ref 20–31)
CREAT SERPL-MCNC: 1.5 MG/DL (ref 0.7–1.2)
EOSINOPHIL # BLD: 0.31 K/UL (ref 0–0.44)
EOSINOPHILS RELATIVE PERCENT: 5 % (ref 1–4)
ERYTHROCYTE [DISTWIDTH] IN BLOOD BY AUTOMATED COUNT: 13.1 % (ref 11.8–14.4)
GFR, ESTIMATED: 50 ML/MIN/1.73M2
GLUCOSE SERPL-MCNC: 116 MG/DL (ref 74–99)
HCT VFR BLD AUTO: 43.4 % (ref 40.7–50.3)
HDLC SERPL-MCNC: 46 MG/DL
HGB BLD-MCNC: 14.1 G/DL (ref 13–17)
IMM GRANULOCYTES # BLD AUTO: <0.03 K/UL (ref 0–0.3)
IMM GRANULOCYTES NFR BLD: 0 %
LDLC SERPL CALC-MCNC: 53 MG/DL (ref 0–100)
LYMPHOCYTES NFR BLD: 1.7 K/UL (ref 1.1–3.7)
LYMPHOCYTES RELATIVE PERCENT: 27 % (ref 24–43)
MCH RBC QN AUTO: 31.1 PG (ref 25.2–33.5)
MCHC RBC AUTO-ENTMCNC: 32.5 G/DL (ref 28.4–34.8)
MCV RBC AUTO: 95.6 FL (ref 82.6–102.9)
MONOCYTES NFR BLD: 0.59 K/UL (ref 0.1–1.2)
MONOCYTES NFR BLD: 9 % (ref 3–12)
NEUTROPHILS NFR BLD: 58 % (ref 36–65)
NEUTS SEG NFR BLD: 3.65 K/UL (ref 1.5–8.1)
NRBC BLD-RTO: 0 PER 100 WBC
PLATELET # BLD AUTO: 171 K/UL (ref 138–453)
PMV BLD AUTO: 10.4 FL (ref 8.1–13.5)
POTASSIUM SERPL-SCNC: 4.5 MMOL/L (ref 3.7–5.3)
PROT SERPL-MCNC: 6.9 G/DL (ref 6.6–8.7)
RBC # BLD AUTO: 4.54 M/UL (ref 4.21–5.77)
SODIUM SERPL-SCNC: 142 MMOL/L (ref 136–145)
TRIGL SERPL-MCNC: 90 MG/DL (ref 0–149)
VLDLC SERPL CALC-MCNC: 18 MG/DL (ref 1–30)
WBC OTHER # BLD: 6.3 K/UL (ref 3.5–11.3)

## 2025-07-07 PROCEDURE — 85025 COMPLETE CBC W/AUTO DIFF WBC: CPT

## 2025-07-07 PROCEDURE — 80061 LIPID PANEL: CPT

## 2025-07-07 PROCEDURE — 80053 COMPREHEN METABOLIC PANEL: CPT

## 2025-07-07 PROCEDURE — 36415 COLL VENOUS BLD VENIPUNCTURE: CPT

## 2025-07-07 PROCEDURE — 82550 ASSAY OF CK (CPK): CPT

## 2025-08-19 ENCOUNTER — HOSPITAL ENCOUNTER (OUTPATIENT)
Dept: LAB | Age: 70
Discharge: HOME | End: 2025-08-19
Payer: MEDICARE

## 2025-08-19 LAB
ANION GAP SERPL CALCULATED.3IONS-SCNC: 12 MMOL/L (ref 9–16)
BUN SERPL-MCNC: 22 MG/DL (ref 8–23)
CALCIUM SERPL-MCNC: 9.6 MG/DL (ref 8.6–10.4)
CHLORIDE SERPL-SCNC: 105 MMOL/L (ref 98–107)
CO2 SERPL-SCNC: 26 MMOL/L (ref 20–31)
CREAT SERPL-MCNC: 1.3 MG/DL (ref 0.7–1.2)
GFR, ESTIMATED: 59 ML/MIN/1.73M2
GLUCOSE SERPL-MCNC: 153 MG/DL (ref 74–99)
POTASSIUM SERPL-SCNC: 4.4 MMOL/L (ref 3.7–5.3)
SODIUM SERPL-SCNC: 143 MMOL/L (ref 136–145)

## 2025-08-19 PROCEDURE — 80048 BASIC METABOLIC PNL TOTAL CA: CPT

## 2025-08-19 PROCEDURE — 36415 COLL VENOUS BLD VENIPUNCTURE: CPT

## (undated) DEVICE — CATHETER DIAG AD 4FR L110CM 145DEG COR RED HYDRPHLC NYL

## (undated) DEVICE — INTENDED FOR TISSUE SEPARATION, AND OTHER PROCEDURES THAT REQUIRE A SHARP SURGICAL BLADE TO PUNCTURE OR CUT.: Brand: BARD-PARKER ® CARBON RIB-BACK BLADES

## (undated) DEVICE — SKIN PREP TRAY W/CHG: Brand: MEDLINE INDUSTRIES, INC.

## (undated) DEVICE — SUTURE NONABSORBABLE MONOFILAMENT 2-0 FS 18 IN ETHILON 664H

## (undated) DEVICE — SMALL TEAR CROSS CUT RASP (11.0 X 5.0MM)

## (undated) DEVICE — PRECISION THIN (9.0 X 0.38 X 25.0MM)

## (undated) DEVICE — SUTURE MCRYL SZ 3-0 L27IN ABSRB UD L26MM SH 1/2 CIR Y416H

## (undated) DEVICE — CATHETER DIAG 4FR L100CM COR NYL 3 DRC W/O SIDE H RADPQ

## (undated) DEVICE — BLANKET WRM W40.2XL55.9IN IORT LO BODY + MISTRAL AIR

## (undated) DEVICE — SOLUTION IV IRRIG 500ML 0.9% SODIUM CHL 2F7123

## (undated) DEVICE — SUTURE MCRYL SZ 3-0 L27IN ABSRB UD PS-2 3/8 CIR REV CUT NDL MCP427H

## (undated) DEVICE — GLOVE SURG SZ 65 CRM LTX FREE POLYISOPRENE POLYMER BEAD ANTI

## (undated) DEVICE — Device

## (undated) DEVICE — BAND COMPR L24CM REG CLR PLAS HEMSTAT EXT HK AND LOOP RETEN

## (undated) DEVICE — CATHETER ANGIO 4FR L100CM S STL NYL JL5 3 SEG BRAID SFT